# Patient Record
Sex: MALE | Race: WHITE | NOT HISPANIC OR LATINO | Employment: FULL TIME | URBAN - METROPOLITAN AREA
[De-identification: names, ages, dates, MRNs, and addresses within clinical notes are randomized per-mention and may not be internally consistent; named-entity substitution may affect disease eponyms.]

---

## 2018-04-02 ENCOUNTER — HOSPITAL ENCOUNTER (OUTPATIENT)
Dept: FAMILY MEDICINE CLINIC | Facility: CLINIC | Age: 58
Setting detail: SPECIMEN
Discharge: HOME | End: 2018-04-02
Attending: NURSE PRACTITIONER | Admitting: NURSE PRACTITIONER

## 2018-04-02 LAB
BILIRUB UR QL STRIP: NEGATIVE MG/DL
C TRACH RRNA SPEC QL PROBE: NORMAL
CASTS URNS QL MICRO: NORMAL /[LPF]
COLOR UR: YELLOW
CONV BACTERIA IN URINE MICRO: NEGATIVE
CONV CLARITY OF URINE: CLEAR
CONV HYALINE CASTS IN URINE MICRO: 0 /[LPF] (ref 0–5)
CONV PROTEIN IN URINE BY AUTOMATED TEST STRIP: NEGATIVE MG/DL
CONV SMALL ROUND CELLS: NORMAL /[HPF]
CONV UROBILINOGEN IN URINE BY AUTOMATED TEST STRIP: 0.2 MG/DL
CULTURE INDICATED?: NORMAL
GLUCOSE UR QL: NEGATIVE MG/DL
HGB UR QL STRIP: NEGATIVE
KETONES UR QL STRIP: NEGATIVE MG/DL
LEUKOCYTE ESTERASE UR QL STRIP: NEGATIVE
N GONORRHOEA RRNA SPEC QL PROBE: NORMAL
NITRITE UR QL STRIP: NEGATIVE
PH UR STRIP.AUTO: 5.5 [PH] (ref 4.5–8)
RBC #/AREA URNS HPF: 0 /[HPF] (ref 0–3)
SP GR UR: 1.01 (ref 1–1.03)
SPECIMEN SOURCE: NORMAL
SPERM URNS QL MICRO: NORMAL /[HPF]
SQUAMOUS SPT QL MICRO: 0 /[HPF] (ref 0–5)
T PALLIDUM IGG SER QL: NONREACTIVE
UNIDENT CRYS URNS QL MICRO: NORMAL /[HPF]
WBC #/AREA URNS HPF: 0 /[HPF] (ref 0–5)
YEAST SPEC QL WET PREP: NORMAL /[HPF]

## 2018-04-03 LAB
CONV HIV-1/ HIV-2: NORMAL
CONV HIV-1/ HIV-2: NORMAL
HSV1 IGG SER IA-ACNC: NEGATIVE

## 2018-05-11 ENCOUNTER — HOSPITAL ENCOUNTER (OUTPATIENT)
Dept: FAMILY MEDICINE CLINIC | Facility: CLINIC | Age: 58
Setting detail: SPECIMEN
Discharge: HOME | End: 2018-05-11
Attending: FAMILY MEDICINE | Admitting: FAMILY MEDICINE

## 2018-05-11 LAB
ALBUMIN SERPL-MCNC: 3.8 G/DL (ref 3.5–4.8)
ALBUMIN/GLOB SERPL: 1.4 {RATIO} (ref 1–1.7)
ALP SERPL-CCNC: 50 IU/L (ref 32–91)
ALT SERPL-CCNC: 26 IU/L (ref 17–63)
ANION GAP SERPL CALC-SCNC: 10 MMOL/L (ref 10–20)
AST SERPL-CCNC: 25 IU/L (ref 15–41)
BASOPHILS # BLD AUTO: 0.1 10*3/UL (ref 0–0.2)
BASOPHILS NFR BLD AUTO: 1 % (ref 0–2)
BILIRUB SERPL-MCNC: 0.6 MG/DL (ref 0.3–1.2)
BILIRUB UR QL STRIP: NEGATIVE MG/DL
BUN SERPL-MCNC: 14 MG/DL (ref 8–20)
BUN/CREAT SERPL: 14 (ref 6.2–20.3)
CALCIUM SERPL-MCNC: 9.2 MG/DL (ref 8.9–10.3)
CASTS URNS QL MICRO: NORMAL /[LPF]
CHLORIDE SERPL-SCNC: 101 MMOL/L (ref 101–111)
CHOLEST SERPL-MCNC: 161 MG/DL
CHOLEST/HDLC SERPL: 3 {RATIO}
COLOR UR: YELLOW
CONV BACTERIA IN URINE MICRO: NEGATIVE
CONV CLARITY OF URINE: CLEAR
CONV CO2: 27 MMOL/L (ref 22–32)
CONV HYALINE CASTS IN URINE MICRO: 0 /[LPF] (ref 0–5)
CONV LDL CHOLESTEROL DIRECT: 85 MG/DL (ref 0–100)
CONV PROTEIN IN URINE BY AUTOMATED TEST STRIP: NEGATIVE MG/DL
CONV SMALL ROUND CELLS: NORMAL /[HPF]
CONV TOTAL PROTEIN: 6.5 G/DL (ref 6.1–7.9)
CONV UROBILINOGEN IN URINE BY AUTOMATED TEST STRIP: 1 MG/DL
CREAT UR-MCNC: 1 MG/DL (ref 0.7–1.2)
CULTURE INDICATED?: NORMAL
DIFFERENTIAL METHOD BLD: (no result)
EOSINOPHIL # BLD AUTO: 0.2 10*3/UL (ref 0–0.3)
EOSINOPHIL # BLD AUTO: 2 % (ref 0–3)
ERYTHROCYTE [DISTWIDTH] IN BLOOD BY AUTOMATED COUNT: 12.9 % (ref 11.5–14.5)
GLOBULIN UR ELPH-MCNC: 2.7 G/DL (ref 2.5–3.8)
GLUCOSE SERPL-MCNC: 98 MG/DL (ref 65–99)
GLUCOSE UR QL: NEGATIVE MG/DL
HCT VFR BLD AUTO: 44.4 % (ref 40–54)
HDLC SERPL-MCNC: 54 MG/DL
HGB BLD-MCNC: 15 G/DL (ref 14–18)
HGB UR QL STRIP: NEGATIVE
KETONES UR QL STRIP: NEGATIVE MG/DL
LDLC/HDLC SERPL: 1.6 {RATIO}
LEUKOCYTE ESTERASE UR QL STRIP: NEGATIVE
LIPID INTERPRETATION: ABNORMAL
LYMPHOCYTES # BLD AUTO: 2.9 10*3/UL (ref 0.8–4.8)
LYMPHOCYTES NFR BLD AUTO: 33 % (ref 18–42)
MCH RBC QN AUTO: 32.4 PG (ref 26–32)
MCHC RBC AUTO-ENTMCNC: 33.7 G/DL (ref 32–36)
MCV RBC AUTO: 96.1 FL (ref 80–94)
MONOCYTES # BLD AUTO: 0.8 10*3/UL (ref 0.1–1.3)
MONOCYTES NFR BLD AUTO: 9 % (ref 2–11)
NEUTROPHILS # BLD AUTO: 4.9 10*3/UL (ref 2.3–8.6)
NEUTROPHILS NFR BLD AUTO: 55 % (ref 50–75)
NITRITE UR QL STRIP: NEGATIVE
NRBC BLD AUTO-RTO: 0 /100{WBCS}
NRBC/RBC NFR BLD MANUAL: 0 10*3/UL
PH UR STRIP.AUTO: 6 [PH] (ref 4.5–8)
PLATELET # BLD AUTO: 241 10*3/UL (ref 150–450)
PMV BLD AUTO: 7.7 FL (ref 7.4–10.4)
POTASSIUM SERPL-SCNC: 4 MMOL/L (ref 3.6–5.1)
RBC # BLD AUTO: 4.62 10*6/UL (ref 4.6–6)
RBC #/AREA URNS HPF: 0 /[HPF] (ref 0–3)
SODIUM SERPL-SCNC: 134 MMOL/L (ref 136–144)
SP GR UR: 1.02 (ref 1–1.03)
SPERM URNS QL MICRO: NORMAL /[HPF]
SQUAMOUS SPT QL MICRO: 0 /[HPF] (ref 0–5)
TRIGL SERPL-MCNC: 165 MG/DL
UNIDENT CRYS URNS QL MICRO: NORMAL /[HPF]
VLDLC SERPL CALC-MCNC: 22 MG/DL
WBC # BLD AUTO: 9 10*3/UL (ref 4.5–11.5)
WBC #/AREA URNS HPF: 0 /[HPF] (ref 0–5)
YEAST SPEC QL WET PREP: NORMAL /[HPF]

## 2018-09-11 ENCOUNTER — HOSPITAL ENCOUNTER (OUTPATIENT)
Dept: CARDIOLOGY | Facility: HOSPITAL | Age: 58
Discharge: HOME | End: 2018-09-11
Attending: INTERNAL MEDICINE | Admitting: INTERNAL MEDICINE

## 2018-09-19 ENCOUNTER — HOSPITAL ENCOUNTER (OUTPATIENT)
Dept: PREADMISSION TESTING | Facility: HOSPITAL | Age: 58
Discharge: HOME | End: 2018-09-19
Attending: INTERNAL MEDICINE | Admitting: INTERNAL MEDICINE

## 2018-09-19 LAB
ANION GAP SERPL CALC-SCNC: 12.1 MMOL/L (ref 10–20)
BASOPHILS # BLD AUTO: 0.1 10*3/UL (ref 0–0.2)
BASOPHILS NFR BLD AUTO: 1 % (ref 0–2)
BUN SERPL-MCNC: 13 MG/DL (ref 8–20)
BUN/CREAT SERPL: 13 (ref 6.2–20.3)
CALCIUM SERPL-MCNC: 8.9 MG/DL (ref 8.9–10.3)
CHLORIDE SERPL-SCNC: 103 MMOL/L (ref 101–111)
CHOLEST SERPL-MCNC: 186 MG/DL
CHOLEST/HDLC SERPL: 4.2 {RATIO}
CONV CO2: 27 MMOL/L (ref 22–32)
CONV LDL CHOLESTEROL DIRECT: 92 MG/DL (ref 0–100)
CREAT UR-MCNC: 1 MG/DL (ref 0.7–1.2)
DIFFERENTIAL METHOD BLD: (no result)
EOSINOPHIL # BLD AUTO: 0.2 10*3/UL (ref 0–0.3)
EOSINOPHIL # BLD AUTO: 2 % (ref 0–3)
ERYTHROCYTE [DISTWIDTH] IN BLOOD BY AUTOMATED COUNT: 13.1 % (ref 11.5–14.5)
GLUCOSE SERPL-MCNC: 105 MG/DL (ref 65–99)
HCT VFR BLD AUTO: 40.2 % (ref 40–54)
HDLC SERPL-MCNC: 44 MG/DL
HGB BLD-MCNC: 13.4 G/DL (ref 14–18)
INR PPP: 1
LDLC/HDLC SERPL: 2.1 {RATIO}
LIPID INTERPRETATION: ABNORMAL
LYMPHOCYTES # BLD AUTO: 2.7 10*3/UL (ref 0.8–4.8)
LYMPHOCYTES NFR BLD AUTO: 33 % (ref 18–42)
MCH RBC QN AUTO: 32 PG (ref 26–32)
MCHC RBC AUTO-ENTMCNC: 33.5 G/DL (ref 32–36)
MCV RBC AUTO: 95.7 FL (ref 80–94)
MONOCYTES # BLD AUTO: 0.8 10*3/UL (ref 0.1–1.3)
MONOCYTES NFR BLD AUTO: 10 % (ref 2–11)
NEUTROPHILS # BLD AUTO: 4.4 10*3/UL (ref 2.3–8.6)
NEUTROPHILS NFR BLD AUTO: 54 % (ref 50–75)
NRBC BLD AUTO-RTO: 0 /100{WBCS}
NRBC/RBC NFR BLD MANUAL: 0 10*3/UL
PLATELET # BLD AUTO: 233 10*3/UL (ref 150–450)
PMV BLD AUTO: 7.3 FL (ref 7.4–10.4)
POTASSIUM SERPL-SCNC: 4.1 MMOL/L (ref 3.6–5.1)
PROTHROMBIN TIME: 10.4 SEC (ref 9.6–11.7)
RBC # BLD AUTO: 4.2 10*6/UL (ref 4.6–6)
SODIUM SERPL-SCNC: 138 MMOL/L (ref 136–144)
TRIGL SERPL-MCNC: 308 MG/DL
VLDLC SERPL CALC-MCNC: 50.3 MG/DL
WBC # BLD AUTO: 8.2 10*3/UL (ref 4.5–11.5)

## 2018-10-16 ENCOUNTER — ON CAMPUS - OUTPATIENT (AMBULATORY)
Dept: URBAN - METROPOLITAN AREA HOSPITAL 2 | Facility: HOSPITAL | Age: 58
End: 2018-10-16
Payer: COMMERCIAL

## 2018-10-16 ENCOUNTER — OFFICE (AMBULATORY)
Dept: URBAN - METROPOLITAN AREA PATHOLOGY 4 | Facility: PATHOLOGY | Age: 58
End: 2018-10-16
Payer: COMMERCIAL

## 2018-10-16 VITALS
HEART RATE: 70 BPM | HEART RATE: 72 BPM | OXYGEN SATURATION: 96 % | DIASTOLIC BLOOD PRESSURE: 71 MMHG | HEART RATE: 81 BPM | HEART RATE: 74 BPM | SYSTOLIC BLOOD PRESSURE: 128 MMHG | TEMPERATURE: 97.5 F | SYSTOLIC BLOOD PRESSURE: 100 MMHG | SYSTOLIC BLOOD PRESSURE: 126 MMHG | SYSTOLIC BLOOD PRESSURE: 92 MMHG | DIASTOLIC BLOOD PRESSURE: 62 MMHG | DIASTOLIC BLOOD PRESSURE: 81 MMHG | OXYGEN SATURATION: 100 % | DIASTOLIC BLOOD PRESSURE: 60 MMHG | DIASTOLIC BLOOD PRESSURE: 48 MMHG | OXYGEN SATURATION: 99 % | SYSTOLIC BLOOD PRESSURE: 141 MMHG | HEIGHT: 70 IN | RESPIRATION RATE: 18 BRPM | RESPIRATION RATE: 15 BRPM | SYSTOLIC BLOOD PRESSURE: 160 MMHG | OXYGEN SATURATION: 98 % | HEART RATE: 75 BPM | HEART RATE: 78 BPM | HEART RATE: 76 BPM | SYSTOLIC BLOOD PRESSURE: 114 MMHG | OXYGEN SATURATION: 94 % | RESPIRATION RATE: 20 BRPM | WEIGHT: 179.8 LBS | DIASTOLIC BLOOD PRESSURE: 143 MMHG | SYSTOLIC BLOOD PRESSURE: 115 MMHG | RESPIRATION RATE: 16 BRPM | DIASTOLIC BLOOD PRESSURE: 64 MMHG | SYSTOLIC BLOOD PRESSURE: 102 MMHG | DIASTOLIC BLOOD PRESSURE: 109 MMHG

## 2018-10-16 DIAGNOSIS — D12.3 BENIGN NEOPLASM OF TRANSVERSE COLON: ICD-10-CM

## 2018-10-16 DIAGNOSIS — D12.0 BENIGN NEOPLASM OF CECUM: ICD-10-CM

## 2018-10-16 DIAGNOSIS — K57.30 DIVERTICULOSIS OF LARGE INTESTINE WITHOUT PERFORATION OR ABS: ICD-10-CM

## 2018-10-16 DIAGNOSIS — D12.2 BENIGN NEOPLASM OF ASCENDING COLON: ICD-10-CM

## 2018-10-16 DIAGNOSIS — Z12.11 ENCOUNTER FOR SCREENING FOR MALIGNANT NEOPLASM OF COLON: ICD-10-CM

## 2018-10-16 LAB
GI HISTOLOGY: A. UNSPECIFIED: (no result)
GI HISTOLOGY: B. UNSPECIFIED: (no result)
GI HISTOLOGY: C. UNSPECIFIED: (no result)
GI HISTOLOGY: PDF REPORT: (no result)

## 2018-10-16 PROCEDURE — 45380 COLONOSCOPY AND BIOPSY: CPT | Mod: 33,59 | Performed by: INTERNAL MEDICINE

## 2018-10-16 PROCEDURE — 88305 TISSUE EXAM BY PATHOLOGIST: CPT | Mod: 33 | Performed by: INTERNAL MEDICINE

## 2018-10-16 PROCEDURE — 45385 COLONOSCOPY W/LESION REMOVAL: CPT | Mod: 33 | Performed by: INTERNAL MEDICINE

## 2018-10-16 PROCEDURE — 45380 COLONOSCOPY AND BIOPSY: CPT | Mod: 59,33 | Performed by: INTERNAL MEDICINE

## 2018-10-16 PROCEDURE — 45381 COLONOSCOPY SUBMUCOUS NJX: CPT | Mod: 33 | Performed by: INTERNAL MEDICINE

## 2019-06-17 ENCOUNTER — OFFICE VISIT (OUTPATIENT)
Dept: FAMILY MEDICINE CLINIC | Facility: CLINIC | Age: 59
End: 2019-06-17

## 2019-06-17 VITALS
DIASTOLIC BLOOD PRESSURE: 82 MMHG | HEART RATE: 72 BPM | BODY MASS INDEX: 25.48 KG/M2 | HEIGHT: 70 IN | WEIGHT: 178 LBS | SYSTOLIC BLOOD PRESSURE: 150 MMHG | RESPIRATION RATE: 16 BRPM

## 2019-06-17 DIAGNOSIS — Z56.6 STRESS AT WORK: Primary | ICD-10-CM

## 2019-06-17 DIAGNOSIS — F41.1 GAD (GENERALIZED ANXIETY DISORDER): ICD-10-CM

## 2019-06-17 PROBLEM — Z87.891 PERSONAL HISTORY OF NICOTINE DEPENDENCE: Status: ACTIVE | Noted: 2018-05-11

## 2019-06-17 PROBLEM — I10 HYPERTENSION: Status: ACTIVE | Noted: 2019-06-17

## 2019-06-17 PROBLEM — I21.3 ST ELEVATION (STEMI) MYOCARDIAL INFARCTION: Status: ACTIVE | Noted: 2019-06-17

## 2019-06-17 PROBLEM — F17.210 CIGARETTE SMOKER: Status: ACTIVE | Noted: 2018-05-11

## 2019-06-17 PROCEDURE — 99214 OFFICE O/P EST MOD 30 MIN: CPT | Performed by: FAMILY MEDICINE

## 2019-06-17 RX ORDER — ATORVASTATIN CALCIUM 80 MG/1
80 TABLET, FILM COATED ORAL DAILY
Qty: 90 TABLET | Refills: 0 | Status: SHIPPED | OUTPATIENT
Start: 2019-06-17 | End: 2019-08-26 | Stop reason: SDUPTHER

## 2019-06-17 RX ORDER — VARENICLINE TARTRATE 1 MG/1
TABLET, FILM COATED ORAL
Refills: 5 | COMMUNITY
Start: 2019-03-28 | End: 2021-12-06

## 2019-06-17 RX ORDER — METOPROLOL SUCCINATE 50 MG/1
TABLET, EXTENDED RELEASE ORAL
Refills: 3 | COMMUNITY
Start: 2019-05-11 | End: 2019-08-26 | Stop reason: SDUPTHER

## 2019-06-17 RX ORDER — EZETIMIBE 10 MG/1
TABLET ORAL
Refills: 1 | COMMUNITY
Start: 2019-03-31 | End: 2019-07-17 | Stop reason: SDUPTHER

## 2019-06-17 RX ORDER — CLOPIDOGREL BISULFATE 75 MG/1
TABLET ORAL
Refills: 2 | COMMUNITY
Start: 2019-05-11 | End: 2019-11-30 | Stop reason: SDUPTHER

## 2019-06-17 RX ORDER — NITROGLYCERIN 0.4 MG/1
TABLET SUBLINGUAL
COMMUNITY
Start: 2015-06-04

## 2019-06-17 RX ORDER — ATORVASTATIN CALCIUM 80 MG/1
80 TABLET, FILM COATED ORAL DAILY
Refills: 2 | COMMUNITY
Start: 2019-06-13 | End: 2019-06-17 | Stop reason: SDUPTHER

## 2019-06-17 RX ORDER — ESCITALOPRAM OXALATE 10 MG/1
10 TABLET ORAL DAILY
Qty: 30 TABLET | Refills: 11 | Status: SHIPPED | OUTPATIENT
Start: 2019-06-17 | End: 2019-07-17

## 2019-06-17 RX ORDER — ASPIRIN 325 MG
325 TABLET ORAL DAILY
COMMUNITY
Start: 2011-09-08 | End: 2021-10-28 | Stop reason: SDUPTHER

## 2019-06-17 RX ORDER — LISINOPRIL 5 MG/1
TABLET ORAL
Refills: 3 | COMMUNITY
Start: 2019-05-11 | End: 2019-08-26 | Stop reason: SDUPTHER

## 2019-06-17 SDOH — HEALTH STABILITY - MENTAL HEALTH: OTHER PHYSICAL AND MENTAL STRAIN RELATED TO WORK: Z56.6

## 2019-06-17 NOTE — PROGRESS NOTES
"Rooming Tab(CC,VS,Pt Hx,Fall Screen)  Chief Complaint   Patient presents with   • Stress       Subjective     Patient is under a lot of stress at work.  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  He has severe CAD and is at risk for recurrent ischemia.  He is getting the work done but his boss is not easy to please.  Sleep is poor.  He is getting worse.      I have reviewed and updated his medications, medical history and problem list during today's office visit.     Patient Care Team:  Bryan Beckham MD as PCP - General  Judson Baumann MD as Consulting Physician (Cardiology)    Problem List Tab  Medications Tab  Synopsis Tab  Chart Review Tab  Care Everywhere Tab  Immunizations Tab  Patient History Tab    Social History     Tobacco Use   • Smoking status: Current Every Day Smoker   Substance Use Topics   • Alcohol use: No     Frequency: Never       Review of Systems   Constitutional: Positive for unexpected weight loss. Negative for fatigue.   Respiratory: Negative.    Cardiovascular: Negative for chest pain.   Neurological: Negative.    Psychiatric/Behavioral: Positive for behavioral problems and stress.       Objective     Rooming Tab(CC,VS,Pt Hx,Fall Screen)  /82 (BP Location: Left arm, Cuff Size: Adult)   Pulse 72   Resp 16   Ht 177.8 cm (70\")   Wt 80.7 kg (178 lb)   BMI 25.54 kg/m²     Body mass index is 25.54 kg/m².    Physical Exam   Constitutional: He appears well-developed and well-nourished.   HENT:   Head: Normocephalic.   Right Ear: External ear normal.   Left Ear: External ear normal.   Nose: Nose normal.   Mouth/Throat: Oropharynx is clear and moist.   Eyes: Conjunctivae and EOM are normal. Pupils are equal, round, and reactive to light.   Neck: Normal range of motion. Neck supple.   Cardiovascular: Normal rate, regular rhythm, normal heart sounds and intact distal pulses.   Pulmonary/Chest: Effort normal and breath sounds normal.   Abdominal: Soft.   Neurological: " He is alert.   Skin: Skin is warm.        Statin Choice Calculator  Data Reviewed:                   Assessment/Plan   Order Review Tab  Health Maintenance Tab  Patient Plan/Order Tab  Diagnoses and all orders for this visit:    1. Stress at work (Primary)  Comments:  Severe work stress.  Threat to his already high risk of recurrent ischemic CAD.  I suggest he consider moving on if he cant resolve the situation with his current boss.    Lexapro 10mg qd    2. DORA (generalized anxiety disorder)  Comments:  Severe sxs related to work.   Putting him at risk for more ischemic damage  Consider Lexapro 10mg qd        Wrapup Tab  Return in about 3 months (around 9/17/2019) for Recheck.

## 2019-07-17 RX ORDER — EZETIMIBE 10 MG/1
TABLET ORAL
Qty: 90 TABLET | Refills: 1 | Status: SHIPPED | OUTPATIENT
Start: 2019-07-17 | End: 2020-01-23

## 2019-08-26 RX ORDER — ATORVASTATIN CALCIUM 80 MG/1
TABLET, FILM COATED ORAL
Qty: 30 TABLET | Refills: 2 | Status: SHIPPED | OUTPATIENT
Start: 2019-08-26 | End: 2020-03-02

## 2019-08-26 RX ORDER — METOPROLOL SUCCINATE 50 MG/1
TABLET, EXTENDED RELEASE ORAL
Qty: 90 TABLET | Refills: 3 | Status: SHIPPED | OUTPATIENT
Start: 2019-08-26 | End: 2020-09-29

## 2019-08-26 RX ORDER — LISINOPRIL 5 MG/1
TABLET ORAL
Qty: 90 TABLET | Refills: 3 | Status: SHIPPED | OUTPATIENT
Start: 2019-08-26 | End: 2020-09-29

## 2019-12-02 RX ORDER — CLOPIDOGREL BISULFATE 75 MG/1
TABLET ORAL
Qty: 90 TABLET | Refills: 2 | Status: SHIPPED | OUTPATIENT
Start: 2019-12-02 | End: 2020-09-29

## 2020-01-23 RX ORDER — EZETIMIBE 10 MG/1
TABLET ORAL
Qty: 90 TABLET | Refills: 0 | Status: SHIPPED | OUTPATIENT
Start: 2020-01-23 | End: 2020-04-29

## 2020-03-02 RX ORDER — ATORVASTATIN CALCIUM 80 MG/1
TABLET, FILM COATED ORAL
Qty: 90 TABLET | Refills: 0 | Status: SHIPPED | OUTPATIENT
Start: 2020-03-02 | End: 2020-06-15

## 2020-04-29 RX ORDER — EZETIMIBE 10 MG/1
TABLET ORAL
Qty: 90 TABLET | Refills: 0 | Status: SHIPPED | OUTPATIENT
Start: 2020-04-29 | End: 2020-08-12

## 2020-06-15 RX ORDER — ATORVASTATIN CALCIUM 80 MG/1
TABLET, FILM COATED ORAL
Qty: 90 TABLET | Refills: 0 | Status: SHIPPED | OUTPATIENT
Start: 2020-06-15 | End: 2020-09-29

## 2020-08-12 RX ORDER — EZETIMIBE 10 MG/1
TABLET ORAL
Qty: 90 TABLET | Refills: 0 | Status: SHIPPED | OUTPATIENT
Start: 2020-08-12 | End: 2020-11-25

## 2020-09-29 RX ORDER — METOPROLOL SUCCINATE 50 MG/1
TABLET, EXTENDED RELEASE ORAL
Qty: 90 TABLET | Refills: 0 | Status: SHIPPED | OUTPATIENT
Start: 2020-09-29 | End: 2020-12-31

## 2020-09-29 RX ORDER — LISINOPRIL 5 MG/1
TABLET ORAL
Qty: 90 TABLET | Refills: 0 | Status: SHIPPED | OUTPATIENT
Start: 2020-09-29 | End: 2020-12-31

## 2020-09-29 RX ORDER — CLOPIDOGREL BISULFATE 75 MG/1
TABLET ORAL
Qty: 90 TABLET | Refills: 0 | Status: SHIPPED | OUTPATIENT
Start: 2020-09-29 | End: 2020-12-31

## 2020-09-29 RX ORDER — ATORVASTATIN CALCIUM 80 MG/1
TABLET, FILM COATED ORAL
Qty: 90 TABLET | Refills: 0 | Status: SHIPPED | OUTPATIENT
Start: 2020-09-29 | End: 2020-12-31

## 2020-11-25 RX ORDER — EZETIMIBE 10 MG/1
TABLET ORAL
Qty: 90 TABLET | Refills: 0 | Status: SHIPPED | OUTPATIENT
Start: 2020-11-25 | End: 2021-03-01

## 2020-12-31 RX ORDER — CLOPIDOGREL BISULFATE 75 MG/1
TABLET ORAL
Qty: 90 TABLET | Refills: 0 | Status: SHIPPED | OUTPATIENT
Start: 2020-12-31 | End: 2021-04-09

## 2020-12-31 RX ORDER — ATORVASTATIN CALCIUM 80 MG/1
TABLET, FILM COATED ORAL
Qty: 90 TABLET | Refills: 0 | Status: SHIPPED | OUTPATIENT
Start: 2020-12-31 | End: 2021-04-09

## 2020-12-31 RX ORDER — LISINOPRIL 5 MG/1
TABLET ORAL
Qty: 90 TABLET | Refills: 0 | Status: SHIPPED | OUTPATIENT
Start: 2020-12-31 | End: 2021-04-09

## 2020-12-31 RX ORDER — METOPROLOL SUCCINATE 50 MG/1
TABLET, EXTENDED RELEASE ORAL
Qty: 90 TABLET | Refills: 0 | Status: SHIPPED | OUTPATIENT
Start: 2020-12-31 | End: 2021-04-09

## 2021-03-01 RX ORDER — EZETIMIBE 10 MG/1
TABLET ORAL
Qty: 90 TABLET | Refills: 0 | Status: SHIPPED | OUTPATIENT
Start: 2021-03-01 | End: 2021-06-01

## 2021-04-09 RX ORDER — METOPROLOL SUCCINATE 50 MG/1
TABLET, EXTENDED RELEASE ORAL
Qty: 90 TABLET | Refills: 0 | Status: SHIPPED | OUTPATIENT
Start: 2021-04-09 | End: 2021-07-10

## 2021-04-09 RX ORDER — LISINOPRIL 5 MG/1
TABLET ORAL
Qty: 90 TABLET | Refills: 0 | Status: SHIPPED | OUTPATIENT
Start: 2021-04-09 | End: 2021-07-10

## 2021-04-09 RX ORDER — CLOPIDOGREL BISULFATE 75 MG/1
TABLET ORAL
Qty: 90 TABLET | Refills: 0 | Status: SHIPPED | OUTPATIENT
Start: 2021-04-09 | End: 2021-07-10

## 2021-04-09 RX ORDER — ATORVASTATIN CALCIUM 80 MG/1
TABLET, FILM COATED ORAL
Qty: 90 TABLET | Refills: 0 | Status: SHIPPED | OUTPATIENT
Start: 2021-04-09 | End: 2021-07-10

## 2021-06-01 RX ORDER — EZETIMIBE 10 MG/1
TABLET ORAL
Qty: 90 TABLET | Refills: 0 | Status: SHIPPED | OUTPATIENT
Start: 2021-06-01 | End: 2021-08-16 | Stop reason: SDUPTHER

## 2021-07-10 RX ORDER — METOPROLOL SUCCINATE 50 MG/1
TABLET, EXTENDED RELEASE ORAL
Qty: 90 TABLET | Refills: 0 | Status: SHIPPED | OUTPATIENT
Start: 2021-07-10 | End: 2021-10-28 | Stop reason: SDUPTHER

## 2021-07-10 RX ORDER — ATORVASTATIN CALCIUM 80 MG/1
TABLET, FILM COATED ORAL
Qty: 90 TABLET | Refills: 0 | Status: SHIPPED | OUTPATIENT
Start: 2021-07-10 | End: 2021-10-28 | Stop reason: SDUPTHER

## 2021-07-10 RX ORDER — CLOPIDOGREL BISULFATE 75 MG/1
TABLET ORAL
Qty: 90 TABLET | Refills: 0 | Status: SHIPPED | OUTPATIENT
Start: 2021-07-10 | End: 2021-10-28 | Stop reason: SDUPTHER

## 2021-07-10 RX ORDER — LISINOPRIL 5 MG/1
TABLET ORAL
Qty: 90 TABLET | Refills: 0 | Status: SHIPPED | OUTPATIENT
Start: 2021-07-10 | End: 2021-10-28 | Stop reason: SDUPTHER

## 2021-07-29 ENCOUNTER — TELEPHONE (OUTPATIENT)
Dept: CARDIOLOGY | Facility: CLINIC | Age: 61
End: 2021-07-29

## 2021-08-16 ENCOUNTER — OFFICE VISIT (OUTPATIENT)
Dept: CARDIOLOGY | Facility: CLINIC | Age: 61
End: 2021-08-16

## 2021-08-16 VITALS
SYSTOLIC BLOOD PRESSURE: 161 MMHG | WEIGHT: 176 LBS | HEART RATE: 69 BPM | BODY MASS INDEX: 25.2 KG/M2 | OXYGEN SATURATION: 99 % | HEIGHT: 70 IN | DIASTOLIC BLOOD PRESSURE: 90 MMHG

## 2021-08-16 DIAGNOSIS — Z01.810 PREOPERATIVE CARDIOVASCULAR EXAMINATION: ICD-10-CM

## 2021-08-16 DIAGNOSIS — I44.7 LBBB (LEFT BUNDLE BRANCH BLOCK): ICD-10-CM

## 2021-08-16 DIAGNOSIS — I25.5 ISCHEMIC CARDIOMYOPATHY: ICD-10-CM

## 2021-08-16 DIAGNOSIS — I10 ESSENTIAL HYPERTENSION: ICD-10-CM

## 2021-08-16 DIAGNOSIS — E78.5 DYSLIPIDEMIA: ICD-10-CM

## 2021-08-16 DIAGNOSIS — Z95.820 STATUS POST ANGIOPLASTY WITH STENT: Primary | ICD-10-CM

## 2021-08-16 PROCEDURE — 93000 ELECTROCARDIOGRAM COMPLETE: CPT | Performed by: INTERNAL MEDICINE

## 2021-08-16 PROCEDURE — 99204 OFFICE O/P NEW MOD 45 MIN: CPT | Performed by: INTERNAL MEDICINE

## 2021-08-16 RX ORDER — ASPIRIN 81 MG/1
81 TABLET ORAL DAILY
COMMUNITY
End: 2021-10-28 | Stop reason: SDUPTHER

## 2021-08-16 RX ORDER — EZETIMIBE 10 MG/1
10 TABLET ORAL DAILY
Qty: 90 TABLET | Refills: 3 | Status: SHIPPED | OUTPATIENT
Start: 2021-08-16 | End: 2022-11-03

## 2021-08-16 NOTE — PROGRESS NOTES
Encounter Date:08/16/2021  Last seen 2018      Patient ID: Zia Lott is a 60 y.o. male.    Chief Complaint:  Status post stent  Left bundle branch block  Preoperative cardiovascular evaluation prior to having colonoscopy      History of Present Illness  Patient last seen in 2018.  Cardiac catheterization was planned at that time but it is unclear whether this was done or not.  Patient does not recall.  Patient is scheduled to have colonoscopy.    Since I have last seen, the patient has been without any chest discomfort ,shortness of breath, palpitations, dizziness or syncope.  Denies having any headache ,abdominal pain ,nausea, vomiting , diarrhea constipation, loss of weight or loss of appetite.  Denies having any excessive bruising ,hematuria or blood in the stool.    Review of all systems negative except as indicated.    Reviewed ROS.  Assessment and Plan       ///////////////////////  Impression  ==========  -Preoperative cardiovascular evaluation prior to having colonoscopy.     - status post stent to LAD 05/30/2015 and 06/20/2013.    Status post subendocardial myocardial infarction 05/30/2015 prior to stent placement.    Cardiac catheterization 2015 revealed -  Previously placed LAD stent was patent.  Distal LAD has 50% disease.  Chronic and total occlusion of 1st marginal branch.  Distal marginal branch was filling from RCA.  Circumflex coronary artery has 90-95%  distal to the origin of marginal branch.  RCA is a large and dominant vessel that has diffuse 50% disease.    Echocardiogram 09/11/2018 revealed severe left ventricular dysfunction with ejection fraction of 25%.  Moderate mitral and tricuspid regurgitation is present.    Stress Cardiolite test 09/11/2018 revealed significant anterior apical inferior and poster lateral infarction and ischemia.       -status post anterior wall myocardial infarction 06/2013 and stent placement to LAD 06/2013.       -ischemic cardiomyopathy.  Recent left  ventricular ejection fraction was 25-30%.       -smoker -patient was advised to abstain from smoking.        -chronic left bundle-branch block      -hypertension and dyslipidemia      -history of drinking alcohol     -no known allergies  ===========   Plan  =============  Patient last seen in 2018.  Cardiac catheterization was planned at that time but it is unclear whether this was done or not.  Patient does not recall.  Patient is scheduled to have colonoscopy.    patient is not having any angina pectoris or congestive heart failure.  EKG showed sinus rhythm left bundle-branch block premature ventricle contractions.    Stress Cardiolite test  Echocardiogram    Medications are reviewed and updated.    patient has history of ischemic cardiomyopathy.  Abstinence from smoking    He made an attempt to look through the computers to see if cardiac cath was done in 2018.  No information is available at this time.    We will try to obtain and try to review the records from before.  Follow-up in the office on the same day      Further plan will depend on patient's Progress.  ///////////            Diagnosis Plan   1. Status post angioplasty with stent  Adult Transthoracic Echo Complete W/ Cont if Necessary Per Protocol    Stress Test With Myocardial Perfusion One Day   2. Essential hypertension  Adult Transthoracic Echo Complete W/ Cont if Necessary Per Protocol    Stress Test With Myocardial Perfusion One Day   3. Dyslipidemia  Adult Transthoracic Echo Complete W/ Cont if Necessary Per Protocol    Stress Test With Myocardial Perfusion One Day   4. Preoperative cardiovascular examination  Adult Transthoracic Echo Complete W/ Cont if Necessary Per Protocol    Stress Test With Myocardial Perfusion One Day   5. LBBB (left bundle branch block)  Adult Transthoracic Echo Complete W/ Cont if Necessary Per Protocol    Stress Test With Myocardial Perfusion One Day   6. Ischemic cardiomyopathy  Adult Transthoracic Echo Complete W/  Cont if Necessary Per Protocol    Stress Test With Myocardial Perfusion One Day   LAB RESULTS (LAST 7 DAYS)    CBC        BMP        CMP         BNP        TROPONIN        CoAg        Creatinine Clearance  CrCl cannot be calculated (Patient's most recent lab result is older than the maximum 30 days allowed.).    ABG        Radiology  No radiology results for the last day                The following portions of the patient's history were reviewed and updated as appropriate: allergies, current medications, past family history, past medical history, past social history, past surgical history and problem list.    Review of Systems   Constitutional: Negative for chills, fever and malaise/fatigue.   Cardiovascular: Negative for chest pain, dyspnea on exertion, leg swelling, palpitations and syncope.   Respiratory: Negative for shortness of breath.    Skin: Negative for rash.   Neurological: Negative for dizziness, light-headedness and numbness.         Current Outpatient Medications:   •  aspirin 81 MG EC tablet, Take 81 mg by mouth Daily., Disp: , Rfl:   •  atorvastatin (LIPITOR) 80 MG tablet, Take 1 tablet by mouth once daily, Disp: 90 tablet, Rfl: 0  •  clopidogrel (PLAVIX) 75 MG tablet, Take 1 tablet by mouth once daily, Disp: 90 tablet, Rfl: 0  •  ezetimibe (ZETIA) 10 MG tablet, Take 1 tablet by mouth Daily., Disp: 90 tablet, Rfl: 3  •  lisinopril (PRINIVIL,ZESTRIL) 5 MG tablet, Take 1 tablet by mouth once daily, Disp: 90 tablet, Rfl: 0  •  metoprolol succinate XL (TOPROL-XL) 50 MG 24 hr tablet, Take 1 tablet by mouth once daily, Disp: 90 tablet, Rfl: 0  •  nitroglycerin (NITROSTAT) 0.4 MG SL tablet, NITROSTAT 0.4 MG SUBL, Disp: , Rfl:   •  aspirin 325 MG tablet, Take 325 mg by mouth Daily., Disp: , Rfl:   •  CHANTIX CONTINUING MONTH JOHN 1 MG tablet, , Disp: , Rfl: 5    No Known Allergies    Family History   Problem Relation Age of Onset   • Diabetes Father        Past Surgical History:   Procedure Laterality Date  "  • CARDIAC CATHETERIZATION  05/30/2015   • CORONARY ANGIOPLASTY WITH STENT PLACEMENT  05/30/2015    stent lad and circumflex distal to the marginal branch   • CORONARY ANGIOPLASTY WITH STENT PLACEMENT  06/2013    stent to lad       Past Medical History:   Diagnosis Date   • BBB (bundle branch block)     lt bundle branch block   • CAD (coronary artery disease)    • Cardiomyopathy (CMS/HCC)    • CHD (coronary heart disease)    • Hyperlipidemia    • Hypertension    • Myocardial infarction (CMS/HCC)     acute       Family History   Problem Relation Age of Onset   • Diabetes Father        Social History     Socioeconomic History   • Marital status:      Spouse name: Not on file   • Number of children: Not on file   • Years of education: Not on file   • Highest education level: Not on file   Tobacco Use   • Smoking status: Current Every Day Smoker     Packs/day: 0.50     Types: Cigarettes   • Smokeless tobacco: Never Used   Vaping Use   • Vaping Use: Never used   Substance and Sexual Activity   • Alcohol use: No   • Drug use: No   • Sexual activity: Defer           ECG 12 Lead    Date/Time: 8/16/2021 11:59 AM  Performed by: Judson Baumann MD  Authorized by: Judson Baumann MD   Comparison: compared with previous ECG   Similar to previous ECG  Comparison to previous ECG: Sinus rhythm left bundle branch block 70/min nonspecific ST-T wave changes right axis deviation no ectopy no change from previous tracing.                Objective:       Physical Exam    /90 (BP Location: Left arm, Patient Position: Sitting, Cuff Size: Adult)   Pulse 69   Ht 177.8 cm (70\")   Wt 79.8 kg (176 lb)   SpO2 99%   BMI 25.25 kg/m²   The patient is alert, oriented and in no distress.    Vital signs as noted above.  Exogenous obesity.    Head and neck revealed no carotid bruits or jugular venous distension.  No thyromegaly or lymphadenopathy is present.    Lungs clear.  No wheezing.  Breath sounds are normal " bilaterally.    Heart normal first and second heart sounds.  No murmur..  No pericardial rub is present.  No gallop is present.    Abdomen soft and nontender.  No organomegaly is present.    Extremities revealed good peripheral pulses without any pedal edema.    Skin warm and dry.    Musculoskeletal system is grossly normal.    CNS grossly normal.    Reviewed and unchanged from last visit.

## 2021-08-20 ENCOUNTER — TELEPHONE (OUTPATIENT)
Dept: CARDIOLOGY | Facility: CLINIC | Age: 61
End: 2021-08-20

## 2021-08-20 NOTE — TELEPHONE ENCOUNTER
Pt is getting clearance for a colonoscopy.  Pt has personal issues and needs to cancel appts for stress test and to see Dr. Baumann on 8/24 ( I cancelled the [facility] appt and transferred to [facility] to cancel and resched) .  Also, he had a heart cath 3 years ago before his last colonoscopy and it showed no blockages.

## 2021-08-23 NOTE — TELEPHONE ENCOUNTER
Called [facility] and left VM to let them know patient needing testing prior to getting cleared, and patient cancelled testing due to personal issues.

## 2021-08-24 ENCOUNTER — APPOINTMENT (OUTPATIENT)
Dept: CARDIOLOGY | Facility: HOSPITAL | Age: 61
End: 2021-08-24

## 2021-08-24 ENCOUNTER — TELEPHONE (OUTPATIENT)
Dept: CARDIOLOGY | Facility: HOSPITAL | Age: 61
End: 2021-08-24

## 2021-08-24 DIAGNOSIS — Z01.810 PREOPERATIVE CARDIOVASCULAR EXAMINATION: Primary | ICD-10-CM

## 2021-08-24 DIAGNOSIS — Z95.820 STATUS POST ANGIOPLASTY WITH STENT: ICD-10-CM

## 2021-08-26 ENCOUNTER — TELEPHONE (OUTPATIENT)
Dept: CARDIOLOGY | Facility: HOSPITAL | Age: 61
End: 2021-08-26

## 2021-09-02 ENCOUNTER — APPOINTMENT (OUTPATIENT)
Dept: CARDIOLOGY | Facility: HOSPITAL | Age: 61
End: 2021-09-02

## 2021-09-14 ENCOUNTER — TELEPHONE (OUTPATIENT)
Dept: CARDIOLOGY | Facility: HOSPITAL | Age: 61
End: 2021-09-14

## 2021-09-14 ENCOUNTER — APPOINTMENT (OUTPATIENT)
Dept: CARDIOLOGY | Facility: HOSPITAL | Age: 61
End: 2021-09-14

## 2021-09-16 NOTE — TELEPHONE ENCOUNTER
Patient is cleared can hold blood thinners 3 days prior letter faxed and given to MR for scanning

## 2021-10-21 ENCOUNTER — OFFICE (AMBULATORY)
Dept: URBAN - METROPOLITAN AREA PATHOLOGY 4 | Facility: PATHOLOGY | Age: 61
End: 2021-10-21
Payer: COMMERCIAL

## 2021-10-21 ENCOUNTER — ON CAMPUS - OUTPATIENT (AMBULATORY)
Dept: URBAN - METROPOLITAN AREA HOSPITAL 2 | Facility: HOSPITAL | Age: 61
End: 2021-10-21
Payer: COMMERCIAL

## 2021-10-21 VITALS
SYSTOLIC BLOOD PRESSURE: 122 MMHG | DIASTOLIC BLOOD PRESSURE: 74 MMHG | OXYGEN SATURATION: 96 % | RESPIRATION RATE: 16 BRPM | RESPIRATION RATE: 18 BRPM | SYSTOLIC BLOOD PRESSURE: 137 MMHG | DIASTOLIC BLOOD PRESSURE: 73 MMHG | DIASTOLIC BLOOD PRESSURE: 84 MMHG | DIASTOLIC BLOOD PRESSURE: 87 MMHG | SYSTOLIC BLOOD PRESSURE: 117 MMHG | HEART RATE: 80 BPM | HEART RATE: 81 BPM | HEIGHT: 70 IN | TEMPERATURE: 97 F | HEART RATE: 78 BPM | OXYGEN SATURATION: 100 % | SYSTOLIC BLOOD PRESSURE: 147 MMHG | WEIGHT: 174 LBS | DIASTOLIC BLOOD PRESSURE: 57 MMHG | HEART RATE: 79 BPM | OXYGEN SATURATION: 98 % | SYSTOLIC BLOOD PRESSURE: 112 MMHG | SYSTOLIC BLOOD PRESSURE: 118 MMHG

## 2021-10-21 DIAGNOSIS — K64.1 SECOND DEGREE HEMORRHOIDS: ICD-10-CM

## 2021-10-21 DIAGNOSIS — D12.5 BENIGN NEOPLASM OF SIGMOID COLON: ICD-10-CM

## 2021-10-21 DIAGNOSIS — Z86.010 PERSONAL HISTORY OF COLONIC POLYPS: ICD-10-CM

## 2021-10-21 DIAGNOSIS — D12.4 BENIGN NEOPLASM OF DESCENDING COLON: ICD-10-CM

## 2021-10-21 DIAGNOSIS — K57.30 DIVERTICULOSIS OF LARGE INTESTINE WITHOUT PERFORATION OR ABS: ICD-10-CM

## 2021-10-21 DIAGNOSIS — D12.0 BENIGN NEOPLASM OF CECUM: ICD-10-CM

## 2021-10-21 DIAGNOSIS — Z08 ENCOUNTER FOR FOLLOW-UP EXAMINATION AFTER COMPLETED TREATMEN: ICD-10-CM

## 2021-10-21 PROBLEM — K63.5 POLYP OF COLON: Status: ACTIVE | Noted: 2021-10-21

## 2021-10-21 PROCEDURE — 45385 COLONOSCOPY W/LESION REMOVAL: CPT | Mod: 33 | Performed by: INTERNAL MEDICINE

## 2021-10-21 PROCEDURE — 88305 TISSUE EXAM BY PATHOLOGIST: CPT | Mod: 33 | Performed by: INTERNAL MEDICINE

## 2021-10-25 RX ORDER — LISINOPRIL 5 MG/1
TABLET ORAL
Qty: 90 TABLET | Refills: 0 | OUTPATIENT
Start: 2021-10-25

## 2021-10-25 RX ORDER — CLOPIDOGREL BISULFATE 75 MG/1
TABLET ORAL
Qty: 90 TABLET | Refills: 0 | OUTPATIENT
Start: 2021-10-25

## 2021-10-25 RX ORDER — METOPROLOL SUCCINATE 50 MG/1
TABLET, EXTENDED RELEASE ORAL
Qty: 90 TABLET | Refills: 0 | OUTPATIENT
Start: 2021-10-25

## 2021-10-25 RX ORDER — ATORVASTATIN CALCIUM 80 MG/1
TABLET, FILM COATED ORAL
Qty: 90 TABLET | Refills: 0 | OUTPATIENT
Start: 2021-10-25

## 2021-10-26 NOTE — TELEPHONE ENCOUNTER
PATIENT CALLED TO CHECK WHY HIS MEDICATIONS WERE REFUSED. JR REQUESTED A CALL TO DISCUSS.    PATIENT HAS ONE DAY LEFT OF MEDICATION.    PLEASE ADVISE [phone]

## 2021-10-27 NOTE — TELEPHONE ENCOUNTER
Patient called back again today to see why his meds were denied.  I told him because he had not been seen in over 2 years.  He scheduled an appt with [facility] on 12/6/2021.  He is out of meds.  Can you send in the 5 from this message that was denied?

## 2021-10-28 RX ORDER — METOPROLOL SUCCINATE 50 MG/1
50 TABLET, EXTENDED RELEASE ORAL DAILY
Qty: 30 TABLET | Refills: 0 | Status: SHIPPED | OUTPATIENT
Start: 2021-10-28 | End: 2021-12-06 | Stop reason: SDUPTHER

## 2021-10-28 RX ORDER — ATORVASTATIN CALCIUM 80 MG/1
80 TABLET, FILM COATED ORAL DAILY
Qty: 30 TABLET | Refills: 0 | Status: SHIPPED | OUTPATIENT
Start: 2021-10-28 | End: 2021-12-06 | Stop reason: SDUPTHER

## 2021-10-28 RX ORDER — CLOPIDOGREL BISULFATE 75 MG/1
75 TABLET ORAL DAILY
Qty: 30 TABLET | Refills: 0 | Status: SHIPPED | OUTPATIENT
Start: 2021-10-28 | End: 2021-12-06 | Stop reason: SDUPTHER

## 2021-10-28 RX ORDER — LISINOPRIL 5 MG/1
5 TABLET ORAL DAILY
Qty: 30 TABLET | Refills: 0 | Status: SHIPPED | OUTPATIENT
Start: 2021-10-28 | End: 2021-12-06 | Stop reason: SDUPTHER

## 2021-10-28 NOTE — TELEPHONE ENCOUNTER
Caller: Zia Lott    Relationship: Self       Medication requested (name and dosage): Atorvastatin Calcium 80 MG Take 1 tablet by mouth once daily     Metoprolol Succinate 50 MG Take 1 tablet by mouth once daily     Lisinopril 5 MG Take 1 tablet by mouth once martin     Clopidogrel Bisulfate 75 MG Take 1 tablet by mouth once daily        Requested Prescriptions:   Requested Prescriptions     Pending Prescriptions Disp Refills   • atorvastatin (LIPITOR) 80 MG tablet 90 tablet 0     Sig: Take 1 tablet by mouth Daily.   • clopidogrel (PLAVIX) 75 MG tablet 90 tablet 0     Sig: Take 1 tablet by mouth Daily.   • lisinopril (PRINIVIL,ZESTRIL) 5 MG tablet 90 tablet 0     Sig: Take 1 tablet by mouth Daily.   • metoprolol succinate XL (TOPROL-XL) 50 MG 24 hr tablet 90 tablet 0     Sig: Take 1 tablet by mouth Daily.        Pharmacy where request should be sent:   [facility] [address] [place] [address] - [phone] [id number] [place], [place] [phone]    Additional details provided by patient: PATIENT IS OUT. PLEASE REFERENCE 10/25 ENCOUNTER. PATIENT HAS SET UP SOONEST 12/6/2021 APPOINTMENT WITH DR FONSECA BUT WILL NEED AN EMERGENCY SCRIPT UNTIL HE CAN MAKE IT IN.    Best call back number: [phone]    Does the patient have less than a 3 day supply:  [x] Yes  [] No    Elizabeth Patel Rep   10/28/21 15:32 EDT

## 2021-12-02 ENCOUNTER — TELEPHONE (OUTPATIENT)
Dept: FAMILY MEDICINE CLINIC | Facility: CLINIC | Age: 61
End: 2021-12-02

## 2021-12-02 RX ORDER — CLOPIDOGREL BISULFATE 75 MG/1
75 TABLET ORAL DAILY
Qty: 30 TABLET | Refills: 0 | OUTPATIENT
Start: 2021-12-02

## 2021-12-02 RX ORDER — METOPROLOL SUCCINATE 50 MG/1
50 TABLET, EXTENDED RELEASE ORAL DAILY
Qty: 30 TABLET | Refills: 0 | OUTPATIENT
Start: 2021-12-02

## 2021-12-02 RX ORDER — ATORVASTATIN CALCIUM 80 MG/1
80 TABLET, FILM COATED ORAL DAILY
Qty: 30 TABLET | Refills: 0 | OUTPATIENT
Start: 2021-12-02

## 2021-12-02 RX ORDER — NITROGLYCERIN 0.4 MG/1
TABLET SUBLINGUAL
OUTPATIENT
Start: 2021-12-02

## 2021-12-02 RX ORDER — LISINOPRIL 5 MG/1
5 TABLET ORAL DAILY
Qty: 30 TABLET | Refills: 0 | OUTPATIENT
Start: 2021-12-02

## 2021-12-06 ENCOUNTER — LAB (OUTPATIENT)
Dept: FAMILY MEDICINE CLINIC | Facility: CLINIC | Age: 61
End: 2021-12-06

## 2021-12-06 ENCOUNTER — OFFICE VISIT (OUTPATIENT)
Dept: FAMILY MEDICINE CLINIC | Facility: CLINIC | Age: 61
End: 2021-12-06

## 2021-12-06 VITALS
BODY MASS INDEX: 25.62 KG/M2 | WEIGHT: 179 LBS | DIASTOLIC BLOOD PRESSURE: 88 MMHG | RESPIRATION RATE: 16 BRPM | OXYGEN SATURATION: 100 % | HEART RATE: 95 BPM | HEIGHT: 70 IN | SYSTOLIC BLOOD PRESSURE: 150 MMHG | TEMPERATURE: 97.3 F

## 2021-12-06 DIAGNOSIS — E78.2 MIXED HYPERLIPIDEMIA: ICD-10-CM

## 2021-12-06 DIAGNOSIS — I10 PRIMARY HYPERTENSION: ICD-10-CM

## 2021-12-06 DIAGNOSIS — F17.210 CIGARETTE SMOKER: ICD-10-CM

## 2021-12-06 DIAGNOSIS — Z12.5 SPECIAL SCREENING FOR MALIGNANT NEOPLASM OF PROSTATE: ICD-10-CM

## 2021-12-06 DIAGNOSIS — Z98.61 STATUS POST PERCUTANEOUS TRANSLUMINAL CORONARY ANGIOPLASTY: ICD-10-CM

## 2021-12-06 DIAGNOSIS — Z56.6 STRESS AT WORK: ICD-10-CM

## 2021-12-06 DIAGNOSIS — I25.10 CHRONIC CORONARY ARTERY DISEASE: Primary | ICD-10-CM

## 2021-12-06 PROBLEM — Z87.891 PERSONAL HISTORY OF NICOTINE DEPENDENCE: Status: RESOLVED | Noted: 2018-05-11 | Resolved: 2021-12-06

## 2021-12-06 LAB — HBA1C MFR BLD: 5.7 % (ref 3.5–5.6)

## 2021-12-06 PROCEDURE — 80053 COMPREHEN METABOLIC PANEL: CPT | Performed by: FAMILY MEDICINE

## 2021-12-06 PROCEDURE — G0103 PSA SCREENING: HCPCS | Performed by: FAMILY MEDICINE

## 2021-12-06 PROCEDURE — 84443 ASSAY THYROID STIM HORMONE: CPT | Performed by: FAMILY MEDICINE

## 2021-12-06 PROCEDURE — 82306 VITAMIN D 25 HYDROXY: CPT | Performed by: FAMILY MEDICINE

## 2021-12-06 PROCEDURE — 80061 LIPID PANEL: CPT | Performed by: FAMILY MEDICINE

## 2021-12-06 PROCEDURE — 99396 PREV VISIT EST AGE 40-64: CPT | Performed by: FAMILY MEDICINE

## 2021-12-06 PROCEDURE — 85025 COMPLETE CBC W/AUTO DIFF WBC: CPT | Performed by: FAMILY MEDICINE

## 2021-12-06 PROCEDURE — 81003 URINALYSIS AUTO W/O SCOPE: CPT | Performed by: FAMILY MEDICINE

## 2021-12-06 PROCEDURE — 82607 VITAMIN B-12: CPT | Performed by: FAMILY MEDICINE

## 2021-12-06 PROCEDURE — 83036 HEMOGLOBIN GLYCOSYLATED A1C: CPT | Performed by: FAMILY MEDICINE

## 2021-12-06 PROCEDURE — 84439 ASSAY OF FREE THYROXINE: CPT | Performed by: FAMILY MEDICINE

## 2021-12-06 PROCEDURE — 36415 COLL VENOUS BLD VENIPUNCTURE: CPT | Performed by: FAMILY MEDICINE

## 2021-12-06 RX ORDER — METOPROLOL SUCCINATE 50 MG/1
50 TABLET, EXTENDED RELEASE ORAL DAILY
Qty: 90 TABLET | Refills: 3 | Status: SHIPPED | OUTPATIENT
Start: 2021-12-06 | End: 2023-01-04

## 2021-12-06 RX ORDER — LISINOPRIL 5 MG/1
5 TABLET ORAL DAILY
Qty: 90 TABLET | Refills: 3 | Status: SHIPPED | OUTPATIENT
Start: 2021-12-06 | End: 2023-01-04

## 2021-12-06 RX ORDER — CLOPIDOGREL BISULFATE 75 MG/1
75 TABLET ORAL DAILY
Qty: 90 TABLET | Refills: 3 | Status: SHIPPED | OUTPATIENT
Start: 2021-12-06 | End: 2023-01-04

## 2021-12-06 RX ORDER — ASPIRIN 81 MG/1
81 TABLET ORAL DAILY
COMMUNITY

## 2021-12-06 RX ORDER — ATORVASTATIN CALCIUM 80 MG/1
80 TABLET, FILM COATED ORAL DAILY
Qty: 90 TABLET | Refills: 3 | Status: SHIPPED | OUTPATIENT
Start: 2021-12-06 | End: 2023-01-04

## 2021-12-06 SDOH — HEALTH STABILITY - MENTAL HEALTH: OTHER PHYSICAL AND MENTAL STRAIN RELATED TO WORK: Z56.6

## 2021-12-06 NOTE — PROGRESS NOTES
Chief Complaint  Hyperlipidemia and Hypertension    Subjective          Zia Lott presents to [facility]  History of Present Illness    The patient presents today for a follow-up. He is requesting refills on his medications.     He has a history of coronary artery disease. The patient denies any recent cardiac issues. He states that he has seen Judson Baumann MD 2 times in the past 3 years. The patient has a history of angioplasty and states that he had 3 or 4 stents placed, the most recent one being in 2015.    His blood pressure is elevated in the office today. A second blood pressure reading was obtained that revealed a blood pressure of 160/90 mm/Hg. He has not taken his medication since 12/04/2021 because he ran out and was told that they could not be refilled until he was seen for an appointment. The patient's medication list includes metoprolol succinate 50 mg once daily, lisinopril 5 mg once daily, Plavix 75 mg once daily, Lipitor 80 mg at night, Zetia 10 mg and aspirin. He states that he takes his medications after lunch. The patient states that he has never used the Nitrostat tablets.     The patient notes that he recently got  again 2 years ago. He is currently working as a supervisor for [employer]. He states that he is going to step down from his current position due to stress. He remains active by working a very physical job. He currently smokes and is no longer using Chantix    The patient is due for a vision exam. He states that he does have cataracts. He denies any issues with urination. He notes that his appetite is doing well. He denies any bowel issues. He had a colonoscopy approximately 4-6 weeks ago and states that he will be due for another colonoscopy in 3 years due to polyps. The patient received the COVID-19 vaccines and states that he is going to schedule his COVID-19 vaccine booster in a couple of weeks.     Objective   Vital Signs:   BP  "150/88 (BP Location: Left arm, Patient Position: Sitting, Cuff Size: Adult)   Pulse 95   Temp 97.3 °F (36.3 °C) (Temporal)   Resp 16   Ht 177.8 cm (70\")   Wt 81.2 kg (179 lb)   SpO2 100%   BMI 25.68 kg/m²     Physical Exam  Constitutional:       Appearance: Normal appearance. He is well-developed and normal weight.   HENT:      Head: Normocephalic and atraumatic.      Right Ear: Tympanic membrane, ear canal and external ear normal.      Left Ear: Tympanic membrane, ear canal and external ear normal.      Nose: Nose normal.      Mouth/Throat:      Mouth: Mucous membranes are moist.      Pharynx: Oropharynx is clear. No oropharyngeal exudate.   Eyes:      Extraocular Movements: Extraocular movements intact.      Conjunctiva/sclera: Conjunctivae normal.      Pupils: Pupils are equal, round, and reactive to light.   Cardiovascular:      Rate and Rhythm: Normal rate and regular rhythm.      Pulses: Normal pulses.      Heart sounds: Normal heart sounds.   Pulmonary:      Effort: Pulmonary effort is normal.      Breath sounds: Normal breath sounds.   Abdominal:      General: Bowel sounds are normal.      Palpations: Abdomen is soft.   Musculoskeletal:         General: Normal range of motion.      Cervical back: Normal range of motion and neck supple.   Skin:     General: Skin is warm and dry.   Neurological:      General: No focal deficit present.      Mental Status: He is alert and oriented to person, place, and time. Mental status is at baseline.   Psychiatric:         Mood and Affect: Mood normal.         Behavior: Behavior normal.         Thought Content: Thought content normal.         Judgment: Judgment normal.        Result Review :                 Assessment and Plan    Diagnoses and all orders for this visit:    1. Chronic coronary artery disease (Primary)  -     metoprolol succinate XL (TOPROL-XL) 50 MG 24 hr tablet; Take 1 tablet by mouth Daily for 90 days.  Dispense: 90 tablet; Refill: 3  -     " lisinopril (PRINIVIL,ZESTRIL) 5 MG tablet; Take 1 tablet by mouth Daily for 90 days.  Dispense: 90 tablet; Refill: 3  -     clopidogrel (PLAVIX) 75 MG tablet; Take 1 tablet by mouth Daily for 90 days.  Dispense: 90 tablet; Refill: 3  -     atorvastatin (LIPITOR) 80 MG tablet; Take 1 tablet by mouth Daily for 90 days.  Dispense: 90 tablet; Refill: 3  -     CBC & Differential  -     Comprehensive Metabolic Panel  -     Hemoglobin A1c  -     Lipid Panel  -     PSA Screen  -     T4, Free  -     TSH  -     Urinalysis With Culture If Indicated - Urine, Clean Catch  -     Vitamin B12  -     Vitamin D 25 Hydroxy  - He has been angina free for several years now. He has not been very compliant with follow-up with his cardiologist or surgical team. He had an angioplasty with several stents placed approximately 3 to 4 years ago.  He thinks his last stent was in 2016, but he is not sure. He has not had any angina, so we are going to continue with risk factor reduction as best we can.    2. Mixed hyperlipidemia  -     metoprolol succinate XL (TOPROL-XL) 50 MG 24 hr tablet; Take 1 tablet by mouth Daily for 90 days.  Dispense: 90 tablet; Refill: 3  -     lisinopril (PRINIVIL,ZESTRIL) 5 MG tablet; Take 1 tablet by mouth Daily for 90 days.  Dispense: 90 tablet; Refill: 3  -     clopidogrel (PLAVIX) 75 MG tablet; Take 1 tablet by mouth Daily for 90 days.  Dispense: 90 tablet; Refill: 3  -     atorvastatin (LIPITOR) 80 MG tablet; Take 1 tablet by mouth Daily for 90 days.  Dispense: 90 tablet; Refill: 3  -     CBC & Differential  -     Comprehensive Metabolic Panel  -     Hemoglobin A1c  -     Lipid Panel  -     PSA Screen  -     T4, Free  -     TSH  -     Urinalysis With Culture If Indicated - Urine, Clean Catch  -     Vitamin B12  -     Vitamin D 25 Hydroxy  -  We will check his lipid level today and we will almost certainly be continuing his Lipitor 80 mg and his Zetia 10 mg.     3. Primary hypertension  -     metoprolol succinate XL  (TOPROL-XL) 50 MG 24 hr tablet; Take 1 tablet by mouth Daily for 90 days.  Dispense: 90 tablet; Refill: 3  -     lisinopril (PRINIVIL,ZESTRIL) 5 MG tablet; Take 1 tablet by mouth Daily for 90 days.  Dispense: 90 tablet; Refill: 3  -     clopidogrel (PLAVIX) 75 MG tablet; Take 1 tablet by mouth Daily for 90 days.  Dispense: 90 tablet; Refill: 3  -     atorvastatin (LIPITOR) 80 MG tablet; Take 1 tablet by mouth Daily for 90 days.  Dispense: 90 tablet; Refill: 3  -     CBC & Differential  -     Comprehensive Metabolic Panel  -     Hemoglobin A1c  -     Lipid Panel  -     PSA Screen  -     T4, Free  -     TSH  -     Urinalysis With Culture If Indicated - Urine, Clean Catch  -     Vitamin B12  -     Vitamin D 25 Hydroxy  -  His systolic blood pressure is elevated today. It was 188 when he first came in. When I took it a while later, it was 160. His diastolic number was excellent.   - He needs to take his medicine. He has been off medicine totally for about 4 days. I told him never to let that happen again and to call us if they try to tell him he can not have those.    4. Status post percutaneous transluminal coronary angioplasty  -     metoprolol succinate XL (TOPROL-XL) 50 MG 24 hr tablet; Take 1 tablet by mouth Daily for 90 days.  Dispense: 90 tablet; Refill: 3  -     lisinopril (PRINIVIL,ZESTRIL) 5 MG tablet; Take 1 tablet by mouth Daily for 90 days.  Dispense: 90 tablet; Refill: 3  -     clopidogrel (PLAVIX) 75 MG tablet; Take 1 tablet by mouth Daily for 90 days.  Dispense: 90 tablet; Refill: 3  -     atorvastatin (LIPITOR) 80 MG tablet; Take 1 tablet by mouth Daily for 90 days.  Dispense: 90 tablet; Refill: 3  -     CBC & Differential  -     Comprehensive Metabolic Panel  -     Hemoglobin A1c  -     Lipid Panel  -     PSA Screen  -     T4, Free  -     TSH  -     Urinalysis With Culture If Indicated - Urine, Clean Catch  -     Vitamin B12  -     Vitamin D 25 Hydroxy      5. Cigarette smoker  -     metoprolol  succinate XL (TOPROL-XL) 50 MG 24 hr tablet; Take 1 tablet by mouth Daily for 90 days.  Dispense: 90 tablet; Refill: 3  -     lisinopril (PRINIVIL,ZESTRIL) 5 MG tablet; Take 1 tablet by mouth Daily for 90 days.  Dispense: 90 tablet; Refill: 3  -     clopidogrel (PLAVIX) 75 MG tablet; Take 1 tablet by mouth Daily for 90 days.  Dispense: 90 tablet; Refill: 3  -     atorvastatin (LIPITOR) 80 MG tablet; Take 1 tablet by mouth Daily for 90 days.  Dispense: 90 tablet; Refill: 3  -     CBC & Differential  -     Comprehensive Metabolic Panel  -     Hemoglobin A1c  -     Lipid Panel  -     PSA Screen  -     T4, Free  -     TSH  -     Urinalysis With Culture If Indicated - Urine, Clean Catch  -     Vitamin B12  -     Vitamin D 25 Hydroxy  - Ric continues to smoke. We discussed his options and he is going to call me back if he becomes interested in the Wellbutrin and the NicoDerm patch. I think that is his best bet. He has tried Chantix before and that did not work.    6. Stress at work  -     metoprolol succinate XL (TOPROL-XL) 50 MG 24 hr tablet; Take 1 tablet by mouth Daily for 90 days.  Dispense: 90 tablet; Refill: 3  -     lisinopril (PRINIVIL,ZESTRIL) 5 MG tablet; Take 1 tablet by mouth Daily for 90 days.  Dispense: 90 tablet; Refill: 3  -     clopidogrel (PLAVIX) 75 MG tablet; Take 1 tablet by mouth Daily for 90 days.  Dispense: 90 tablet; Refill: 3  -     atorvastatin (LIPITOR) 80 MG tablet; Take 1 tablet by mouth Daily for 90 days.  Dispense: 90 tablet; Refill: 3  -     CBC & Differential  -     Comprehensive Metabolic Panel  -     Hemoglobin A1c  -     Lipid Panel  -     PSA Screen  -     T4, Free  -     TSH  -     Urinalysis With Culture If Indicated - Urine, Clean Catch  -     Vitamin B12  -     Vitamin D 25 Hydroxy  -  Patient has a situation at work that is very stressful. We are going to try and get him to change to a position that is less stressful. 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    7. Special screening for malignant neoplasm of prostate  -     metoprolol succinate XL (TOPROL-XL) 50 MG 24 hr tablet; Take 1 tablet by mouth Daily for 90 days.  Dispense: 90 tablet; Refill: 3  -     lisinopril (PRINIVIL,ZESTRIL) 5 MG tablet; Take 1 tablet by mouth Daily for 90 days.  Dispense: 90 tablet; Refill: 3  -     clopidogrel (PLAVIX) 75 MG tablet; Take 1 tablet by mouth Daily for 90 days.  Dispense: 90 tablet; Refill: 3  -     atorvastatin (LIPITOR) 80 MG tablet; Take 1 tablet by mouth Daily for 90 days.  Dispense: 90 tablet; Refill: 3  -     CBC & Differential  -     Comprehensive Metabolic Panel  -     Hemoglobin A1c  -     Lipid Panel  -     PSA Screen  -     T4, Free  -     TSH  -     Urinalysis With Culture If Indicated - Urine, Clean Catch  -     Vitamin B12  -     Vitamin D 25 Hydroxy  - We will obtain a PSA today. He has not had one for several years.         Follow Up   Return in about 1 year (around 12/6/2022) for Recheck.  Patient was given instructions and counseling regarding his condition or for health maintenance advice. Please see specific information pulled into the AVS if appropriate.     Transcribed from ambient dictation for Bryan Beckham MD by Mila Wu.  12/06/21   18:15 EST    Patient verbalized consent to the visit recording.  I have personally performed the services described in this document as transcribed by the above individual, and it is both accurate and complete.  Bryan Beckham MD  12/11/2021  11:19 EST       Breathing spontaneous and unlabored. Breath sounds clear and equal bilaterally with regular rhythm. Breathing spontaneous and unlabored. Breath sounds clear and equal bilaterally with regular rhythm.

## 2021-12-06 NOTE — PATIENT INSTRUCTIONS
Steps to Quit Smoking  Smoking tobacco is the leading cause of preventable death. It can affect almost every organ in the body. Smoking puts you and people around you at risk for many serious, long-lasting (chronic) diseases. Quitting smoking can be hard, but it is one of the best things that you can do for your health. It is never too late to quit.  How do I get ready to quit?  When you decide to quit smoking, make a plan to help you succeed. Before you quit:  · Pick a date to quit. Set a date within the next 2 weeks to give you time to prepare.  · Write down the reasons why you are quitting. Keep this list in places where you will see it often.  · Tell your family, friends, and co-workers that you are quitting. Their support is important.  · Talk with your doctor about the choices that may help you quit.  · Find out if your health insurance will pay for these treatments.  · Know the people, places, things, and activities that make you want to smoke (triggers). Avoid them.  What first steps can I take to quit smoking?  · Throw away all cigarettes at home, at work, and in your car.  · Throw away the things that you use when you smoke, such as ashtrays and lighters.  · Clean your car. Make sure to empty the ashtray.  · Clean your home, including curtains and carpets.  What can I do to help me quit smoking?  Talk with your doctor about taking medicines and seeing a counselor at the same time. You are more likely to succeed when you do both.  · If you are pregnant or breastfeeding, talk with your doctor about counseling or other ways to quit smoking. Do not take medicine to help you quit smoking unless your doctor tells you to do so.  To quit smoking:  Quit right away  · Quit smoking totally, instead of slowly cutting back on how much you smoke over a period of time.  · Go to counseling. You are more likely to quit if you go to counseling sessions regularly.  Take medicine  You may take medicines to help you quit. Some  medicines need a prescription, and some you can buy over-the-counter. Some medicines may contain a drug called nicotine to replace the nicotine in cigarettes. Medicines may:  · Help you to stop having the desire to smoke (cravings).  · Help to stop the problems that come when you stop smoking (withdrawal symptoms).  Your doctor may ask you to use:  · Nicotine patches, gum, or lozenges.  · Nicotine inhalers or sprays.  · Non-nicotine medicine that is taken by mouth.  Find resources  Find resources and other ways to help you quit smoking and remain smoke-free after you quit. These resources are most helpful when you use them often. They include:  · Online chats with a counselor.  · Phone quitlines.  · Printed self-help materials.  · Support groups or group counseling.  · Text messaging programs.  · Mobile phone apps. Use apps on your mobile phone or tablet that can help you stick to your quit plan. There are many free apps for mobile phones and tablets as well as websites. Examples include Quit Guide from the CDC and smokefree.gov    What things can I do to make it easier to quit?    · Talk to your family and friends. Ask them to support and encourage you.  · Call a phone quitline (9-127-QUITNOW), reach out to support groups, or work with a counselor.  · Ask people who smoke to not smoke around you.  · Avoid places that make you want to smoke, such as:  ? Bars.  ? Parties.  ? Smoke-break areas at work.  · Spend time with people who do not smoke.  · Lower the stress in your life. Stress can make you want to smoke. Try these things to help your stress:  ? Getting regular exercise.  ? Doing deep-breathing exercises.  ? Doing yoga.  ? Meditating.  ? Doing a body scan. To do this, close your eyes, focus on one area of your body at a time from head to toe. Notice which parts of your body are tense. Try to relax the muscles in those areas.  How will I feel when I quit smoking?  Day 1 to 3 weeks  Within the first 24 hours,  you may start to have some problems that come from quitting tobacco. These problems are very bad 2-3 days after you quit, but they do not often last for more than 2-3 weeks. You may get these symptoms:  · Mood swings.  · Feeling restless, nervous, angry, or annoyed.  · Trouble concentrating.  · Dizziness.  · Strong desire for high-sugar foods and nicotine.  · Weight gain.  · Trouble pooping (constipation).  · Feeling like you may vomit (nausea).  · Coughing or a sore throat.  · Changes in how the medicines that you take for other issues work in your body.  · Depression.  · Trouble sleeping (insomnia).  Week 3 and afterward  After the first 2-3 weeks of quitting, you may start to notice more positive results, such as:  · Better sense of smell and taste.  · Less coughing and sore throat.  · Slower heart rate.  · Lower blood pressure.  · Clearer skin.  · Better breathing.  · Fewer sick days.  Quitting smoking can be hard. Do not give up if you fail the first time. Some people need to try a few times before they succeed. Do your best to stick to your quit plan, and talk with your doctor if you have any questions or concerns.  Summary  · Smoking tobacco is the leading cause of preventable death. Quitting smoking can be hard, but it is one of the best things that you can do for your health.  · When you decide to quit smoking, make a plan to help you succeed.  · Quit smoking right away, not slowly over a period of time.  · When you start quitting, seek help from your doctor, family, or friends.  This information is not intended to replace advice given to you by your health care provider. Make sure you discuss any questions you have with your health care provider.  Document Revised: 09/11/2020 Document Reviewed: 03/07/2020  Elsevier Patient Education © 2021 [employer]

## 2021-12-07 LAB
25(OH)D3 SERPL-MCNC: 11.7 NG/ML (ref 30–100)
ALBUMIN SERPL-MCNC: 4.7 G/DL (ref 3.5–5.2)
ALBUMIN/GLOB SERPL: 1.8 G/DL
ALP SERPL-CCNC: 60 U/L (ref 39–117)
ALT SERPL W P-5'-P-CCNC: 22 U/L (ref 1–41)
ANION GAP SERPL CALCULATED.3IONS-SCNC: 16.4 MMOL/L (ref 5–15)
AST SERPL-CCNC: 23 U/L (ref 1–40)
BASOPHILS # BLD AUTO: 0.1 10*3/MM3 (ref 0–0.2)
BASOPHILS NFR BLD AUTO: 1.1 % (ref 0–1.5)
BILIRUB SERPL-MCNC: 0.5 MG/DL (ref 0–1.2)
BILIRUB UR QL STRIP: NEGATIVE
BUN SERPL-MCNC: 13 MG/DL (ref 8–23)
BUN/CREAT SERPL: 15.9 (ref 7–25)
CALCIUM SPEC-SCNC: 9.5 MG/DL (ref 8.6–10.5)
CHLORIDE SERPL-SCNC: 102 MMOL/L (ref 98–107)
CHOLEST SERPL-MCNC: 166 MG/DL (ref 0–200)
CLARITY UR: CLEAR
CO2 SERPL-SCNC: 22.6 MMOL/L (ref 22–29)
COLOR UR: YELLOW
CREAT SERPL-MCNC: 0.82 MG/DL (ref 0.76–1.27)
DEPRECATED RDW RBC AUTO: 42.4 FL (ref 37–54)
EOSINOPHIL # BLD AUTO: 0.11 10*3/MM3 (ref 0–0.4)
EOSINOPHIL NFR BLD AUTO: 1.2 % (ref 0.3–6.2)
ERYTHROCYTE [DISTWIDTH] IN BLOOD BY AUTOMATED COUNT: 12.3 % (ref 12.3–15.4)
GFR SERPL CREATININE-BSD FRML MDRD: 95 ML/MIN/1.73
GLOBULIN UR ELPH-MCNC: 2.6 GM/DL
GLUCOSE SERPL-MCNC: 90 MG/DL (ref 65–99)
GLUCOSE UR STRIP-MCNC: NEGATIVE MG/DL
HCT VFR BLD AUTO: 42.2 % (ref 37.5–51)
HDLC SERPL-MCNC: 75 MG/DL (ref 40–60)
HGB BLD-MCNC: 14.8 G/DL (ref 13–17.7)
HGB UR QL STRIP.AUTO: NEGATIVE
IMM GRANULOCYTES # BLD AUTO: 0.03 10*3/MM3 (ref 0–0.05)
IMM GRANULOCYTES NFR BLD AUTO: 0.3 % (ref 0–0.5)
KETONES UR QL STRIP: NEGATIVE
LDLC SERPL CALC-MCNC: 76 MG/DL (ref 0–100)
LDLC/HDLC SERPL: 0.99 {RATIO}
LEUKOCYTE ESTERASE UR QL STRIP.AUTO: NEGATIVE
LYMPHOCYTES # BLD AUTO: 2.57 10*3/MM3 (ref 0.7–3.1)
LYMPHOCYTES NFR BLD AUTO: 28.5 % (ref 19.6–45.3)
MCH RBC QN AUTO: 33.2 PG (ref 26.6–33)
MCHC RBC AUTO-ENTMCNC: 35.1 G/DL (ref 31.5–35.7)
MCV RBC AUTO: 94.6 FL (ref 79–97)
MONOCYTES # BLD AUTO: 0.97 10*3/MM3 (ref 0.1–0.9)
MONOCYTES NFR BLD AUTO: 10.7 % (ref 5–12)
NEUTROPHILS NFR BLD AUTO: 5.25 10*3/MM3 (ref 1.7–7)
NEUTROPHILS NFR BLD AUTO: 58.2 % (ref 42.7–76)
NITRITE UR QL STRIP: NEGATIVE
NRBC BLD AUTO-RTO: 0 /100 WBC (ref 0–0.2)
PH UR STRIP.AUTO: 6 [PH] (ref 5–8)
PLATELET # BLD AUTO: 257 10*3/MM3 (ref 140–450)
PMV BLD AUTO: 9.5 FL (ref 6–12)
POTASSIUM SERPL-SCNC: 4 MMOL/L (ref 3.5–5.2)
PROT SERPL-MCNC: 7.3 G/DL (ref 6–8.5)
PROT UR QL STRIP: NEGATIVE
PSA SERPL-MCNC: 2.69 NG/ML (ref 0–4)
RBC # BLD AUTO: 4.46 10*6/MM3 (ref 4.14–5.8)
SODIUM SERPL-SCNC: 141 MMOL/L (ref 136–145)
SP GR UR STRIP: 1.02 (ref 1–1.03)
T4 FREE SERPL-MCNC: 1.02 NG/DL (ref 0.93–1.7)
TRIGL SERPL-MCNC: 82 MG/DL (ref 0–150)
TSH SERPL DL<=0.05 MIU/L-ACNC: 2.52 UIU/ML (ref 0.27–4.2)
UROBILINOGEN UR QL STRIP: NORMAL
VIT B12 BLD-MCNC: 315 PG/ML (ref 211–946)
VLDLC SERPL-MCNC: 15 MG/DL (ref 5–40)
WBC NRBC COR # BLD: 9.03 10*3/MM3 (ref 3.4–10.8)

## 2021-12-07 NOTE — PROGRESS NOTES
Your labs indicate your Vit D level is low.  We will call in capsules of the 50,000 units each and I would ask that you take one a week for the next 12 weeks.   After those 12 weeks are completed then  buy otc Vit D at 2000 units each  and take one daily.   Lets recheck the Vit D level in about 5-6 months..

## 2021-12-15 RX ORDER — ERGOCALCIFEROL 1.25 MG/1
50000 CAPSULE ORAL
Qty: 12 CAPSULE | Refills: 1 | Status: SHIPPED | OUTPATIENT
Start: 2021-12-15 | End: 2022-03-03

## 2022-03-23 NOTE — TELEPHONE ENCOUNTER
Incoming Refill Request      Medication requested (name and dose):   nitroglycerin (NITROSTAT) 0.4 MG SL tablet        lisinopril (PRINIVIL,ZESTRIL) 5 MG tablet  5 mg, Daily     metoprolol succinate XL (TOPROL-XL) 50 MG 24 hr tablet  50 mg, Daily     clopidogrel (PLAVIX) 75 MG tablet  75 mg, Daily     atorvastatin (LIPITOR) 80 MG tablet  80 mg, Daily           Pharmacy where request should be sent: [address] - [phone] [place] [phone] [phone]    Additional details provided by patient: PATIENT SAID HE WILL BE OUT OF MEDICATION OVER THE WEEKEND, HE IS WANTING TO SEE IF THESE CAN BE FILLED BEFORE HIS APPOINTMENT Monday, 12/06 WITH DR. RAYMUNDO Harris call back number: [phone]    Does the patient have less than a 3 day supply:  [x] Yes  [] No    Elizabeth Akins Rep  12/02/21, 15:27 EST             Surgeon Performing Repair (Optional): Dr. Ramirez

## 2022-08-05 ENCOUNTER — TELEPHONE (OUTPATIENT)
Dept: FAMILY MEDICINE CLINIC | Facility: CLINIC | Age: 62
End: 2022-08-05

## 2022-08-05 NOTE — TELEPHONE ENCOUNTER
Caller: Zia Lott    Relationship to patient: Self    Best call back number: [phone]    Date of exposure: n/a    Date of positive COVID19 test: 08/05/22, HOME TEST    Date if possible COVID19 exposure: N/A    COVID19 symptoms: HEAD, CHEST COLD SYMPTOMS, FATIGUE    Date of initial quarantine: 08/05/22    Additional information or concerns: PATIENT STARTED FEELING BAD THIS MORNING, HE SAID HE IS WANTING TO SEE WHAT HE SHOULD BE DOING     What is the patients preferred pharmacy: [facility] [address] [place] [address] ROAD - [phone] [place] [phone] [phone]

## 2022-08-08 NOTE — TELEPHONE ENCOUNTER
LVM FOR PATIENT AT 10:50.    FOR HUB TO SAY: Treat symptoms with OTC Mucinex dm tylenol, vitc 2000 vit d 2000 zinc 50mg

## 2022-11-03 RX ORDER — EZETIMIBE 10 MG/1
TABLET ORAL
Qty: 90 TABLET | Refills: 0 | Status: SHIPPED | OUTPATIENT
Start: 2022-11-03 | End: 2023-01-04

## 2022-12-07 ENCOUNTER — OFFICE VISIT (OUTPATIENT)
Dept: FAMILY MEDICINE CLINIC | Facility: CLINIC | Age: 62
End: 2022-12-07

## 2022-12-07 VITALS
HEART RATE: 74 BPM | WEIGHT: 173 LBS | SYSTOLIC BLOOD PRESSURE: 120 MMHG | DIASTOLIC BLOOD PRESSURE: 82 MMHG | RESPIRATION RATE: 16 BRPM | BODY MASS INDEX: 24.77 KG/M2 | HEIGHT: 70 IN | OXYGEN SATURATION: 97 %

## 2022-12-07 DIAGNOSIS — Z12.5 SPECIAL SCREENING FOR MALIGNANT NEOPLASM OF PROSTATE: ICD-10-CM

## 2022-12-07 DIAGNOSIS — I25.10 CHRONIC CORONARY ARTERY DISEASE: Primary | ICD-10-CM

## 2022-12-07 DIAGNOSIS — F17.210 CIGARETTE SMOKER: ICD-10-CM

## 2022-12-07 DIAGNOSIS — I10 PRIMARY HYPERTENSION: ICD-10-CM

## 2022-12-07 DIAGNOSIS — I25.5 CARDIOMYOPATHY, ISCHEMIC: ICD-10-CM

## 2022-12-07 DIAGNOSIS — F41.1 GAD (GENERALIZED ANXIETY DISORDER): ICD-10-CM

## 2022-12-07 DIAGNOSIS — E78.2 MIXED HYPERLIPIDEMIA: ICD-10-CM

## 2022-12-07 PROCEDURE — 99214 OFFICE O/P EST MOD 30 MIN: CPT | Performed by: FAMILY MEDICINE

## 2022-12-07 RX ORDER — VARENICLINE TARTRATE 25 MG
KIT ORAL
Qty: 53 TABLET | Refills: 0 | Status: SHIPPED | OUTPATIENT
Start: 2022-12-07 | End: 2023-03-30

## 2022-12-07 NOTE — PROGRESS NOTES
"Chief Complaint  Annual Exam    Subjective      Zia Lott presents to [facility]  History of Present Illness  For annual exam.  The patient presents today for an annual visit.    The patient states that he is doing well overall. He denies any chest pain or angina. He states that he is able to perform all of his daily activities without difficulty. 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    He states that he has got three doses of the COVID-19 vaccine and inquires if he should get the fourth dose. The patient asks whether or not he should have a shingles vaccine.     The patient states that he stays up to date on his colonoscopy and gets them every 3 years. He reports that the colonoscopy revealed polyps; therefore, he follows-up on it regularly.    The patient states that he is not managing his smoking habit well, and smokes 1 pack of cigarettes per day. He admits that he has tried to quit several times and is willing to try Chantix again. He reports that he noticed a significant different when he tried Chantix in the past; however, he ran out of Chantix and was unable to obtain more of it. He denies using Wellbutrin and NicoDerm patch in the past for smoking cessation.     He denies getting his bloodwork done prior to this visit but states that he got it done at his last visit.      Objective      Vital Signs:  /82 (BP Location: Right arm, Cuff Size: Adult)   Pulse 74   Resp 16   Ht 177.8 cm (70\")   Wt 78.5 kg (173 lb)   SpO2 97%   BMI 24.82 kg/m²   Estimated body mass index is 24.82 kg/m² as calculated from the following:    Height as of this encounter: 177.8 cm (70\").    Weight as of this encounter: 78.5 kg (173 lb).    BMI is within normal parameters. No other follow-up for " BMI required.      Physical Exam  Constitutional:       Appearance: Normal appearance. He is well-developed and normal weight.   HENT:      Head: Normocephalic and atraumatic.      Right Ear: Tympanic membrane, ear canal and external ear normal.      Left Ear: Tympanic membrane, ear canal and external ear normal.      Nose: Nose normal.      Mouth/Throat:      Mouth: Mucous membranes are moist.      Pharynx: Oropharynx is clear. No oropharyngeal exudate.   Eyes:      Extraocular Movements: Extraocular movements intact.      Conjunctiva/sclera: Conjunctivae normal.      Pupils: Pupils are equal, round, and reactive to light.   Cardiovascular:      Rate and Rhythm: Normal rate and regular rhythm.      Pulses: Normal pulses.      Heart sounds: Normal heart sounds.   Pulmonary:      Effort: Pulmonary effort is normal.      Breath sounds: Normal breath sounds.   Abdominal:      General: Bowel sounds are normal.      Palpations: Abdomen is soft.   Musculoskeletal:         General: Normal range of motion.      Cervical back: Normal range of motion and neck supple.   Skin:     General: Skin is warm and dry.   Neurological:      General: No focal deficit present.      Mental Status: He is alert and oriented to person, place, and time. Mental status is at baseline.   Psychiatric:         Mood and Affect: Mood normal.         Behavior: Behavior normal.         Thought Content: Thought content normal.         Judgment: Judgment normal.        Assessment and Plan   1. Chronic coronary artery disease  - Zia is a 61-year-old white male with known coronary artery disease who presents today for his annual checkup. He had a significantly good year. He has not had any angina and he is working full-time, staying busy and physically active and feels reasonably well. Zia's biggest health risk is the fact that he still smokes, so we have finally convinced him to try again to quit smoking. He is going to try Chantix. Otherwise, he  is up to date with his immunizations, his colonoscopy and we will continue his other medicines as listed.    2. Cardiomyopathy - Ischemic  - Zia has a history of several myocardial infarctions and he has an ischemic cardiomyopathy. His cardiac output is reasonable, approximately around 40% when last checked and he is tolerating that quite well. He is working full time without any dyspnea, edema, and overall doing quite well. We will continue his current medicines as listed.    3. Primary hypertension  - The patient's blood pressure today was very good at 120/82 mmHg. He takes Prinivil 5 mg per day, metoprolol XL 50 mg per day and those two have kept his blood pressure excellent. We will continue these.    4. Mixed hyperlipidemia  - The patient's lipids have been reasonably well controlled with Zetia. He can not tolerate a statin. Every time we have tried on, he has gotten extremely sick experiencing muscle aches and muscle enzyme elevation. We will evaluate his lipid panel with the Zetia this year. He might be a candidate for injectable or an infusion or a subcutaneous injection of one of the newer medications for cholesterol; however, I am unsure if his insurance was willing to cover upon inquiring in the past. We will evaluate his recent lipid panel in 2022 and will proceed accordingly.    5. Generalized anxiety disorder  - Zia is a known patient of mild anxiety. He is managing it well with meditation and exercise.    6. Cigarette smoker  - Zia has been a cigarette smoker for several years. He has tried to quit multiple times and the closest he got to and he sustained stop time was with Chantix. He is willing to try again, so I gave him prescription for a starter pack of Chantix and then once that is over, he will be on extended 1 mg two times per day dosing for the next couple of years.    7. Special screening for malignant neoplasm of the prostate  - The patient will have a prostate-specific antigen  test done today for screening purposes. Patient has had his colonoscopy. He does have polyps, so he is anticipating the need for another colon study in the next couple of years.     I spent 33 minutes caring for Zia on this date of service. This time includes time spent by me in the following activities:preparing for the visit, reviewing tests, obtaining and/or reviewing a separately obtained history, performing a medically appropriate examination and/or evaluation , counseling and educating the patient/family/caregiver, ordering medications, tests, or procedures, referring and communicating with other health care professionals , documenting information in the medical record, independently interpreting results and communicating that information with the patient/family/caregiver and care coordination     Follow Up    Transcribed from ambient dictation for Bryan Beckham MD by Yadira Hauser.  12/07/22   19:39 EST    Patient or patient representative verbalized consent to the visit recording.  I have personally performed the services described in this document as transcribed by the above individual, and it is both accurate and complete.  Bryan Beckham MD  12/11/2022  19:30 EST     Return in about 1 year (around 12/7/2023) for Recheck.  Patient was given instructions and counseling regarding his condition or for health maintenance advice. Please see specific information pulled into the AVS if appropriate.

## 2022-12-08 ENCOUNTER — LAB (OUTPATIENT)
Dept: FAMILY MEDICINE CLINIC | Facility: CLINIC | Age: 62
End: 2022-12-08

## 2022-12-08 DIAGNOSIS — I25.10 CHRONIC CORONARY ARTERY DISEASE: Primary | ICD-10-CM

## 2022-12-08 DIAGNOSIS — I25.5 CARDIOMYOPATHY, ISCHEMIC: ICD-10-CM

## 2022-12-08 DIAGNOSIS — I10 PRIMARY HYPERTENSION: ICD-10-CM

## 2022-12-08 PROCEDURE — 81001 URINALYSIS AUTO W/SCOPE: CPT | Performed by: FAMILY MEDICINE

## 2022-12-08 PROCEDURE — 36415 COLL VENOUS BLD VENIPUNCTURE: CPT | Performed by: FAMILY MEDICINE

## 2022-12-08 PROCEDURE — 80050 GENERAL HEALTH PANEL: CPT | Performed by: FAMILY MEDICINE

## 2022-12-08 PROCEDURE — G0103 PSA SCREENING: HCPCS | Performed by: FAMILY MEDICINE

## 2022-12-08 PROCEDURE — 84439 ASSAY OF FREE THYROXINE: CPT | Performed by: FAMILY MEDICINE

## 2022-12-08 PROCEDURE — 83036 HEMOGLOBIN GLYCOSYLATED A1C: CPT | Performed by: FAMILY MEDICINE

## 2022-12-08 PROCEDURE — 82607 VITAMIN B-12: CPT | Performed by: FAMILY MEDICINE

## 2022-12-08 PROCEDURE — 80061 LIPID PANEL: CPT | Performed by: FAMILY MEDICINE

## 2022-12-09 LAB
ALBUMIN SERPL-MCNC: 4 G/DL (ref 3.5–5.2)
ALBUMIN/GLOB SERPL: 1.4 G/DL
ALP SERPL-CCNC: 54 U/L (ref 39–117)
ALT SERPL W P-5'-P-CCNC: 17 U/L (ref 1–41)
ANION GAP SERPL CALCULATED.3IONS-SCNC: 13.2 MMOL/L (ref 5–15)
AST SERPL-CCNC: 21 U/L (ref 1–40)
BACTERIA UR QL AUTO: NORMAL /HPF
BASOPHILS # BLD AUTO: 0.07 10*3/MM3 (ref 0–0.2)
BASOPHILS NFR BLD AUTO: 0.8 % (ref 0–1.5)
BILIRUB SERPL-MCNC: 0.7 MG/DL (ref 0–1.2)
BILIRUB UR QL STRIP: NEGATIVE
BUN SERPL-MCNC: 15 MG/DL (ref 8–23)
BUN/CREAT SERPL: 15.6 (ref 7–25)
CALCIUM SPEC-SCNC: 9.7 MG/DL (ref 8.6–10.5)
CHLORIDE SERPL-SCNC: 101 MMOL/L (ref 98–107)
CHOLEST SERPL-MCNC: 144 MG/DL (ref 0–200)
CLARITY UR: CLEAR
CO2 SERPL-SCNC: 24.8 MMOL/L (ref 22–29)
COLOR UR: YELLOW
CREAT SERPL-MCNC: 0.96 MG/DL (ref 0.76–1.27)
DEPRECATED RDW RBC AUTO: 41.4 FL (ref 37–54)
EGFRCR SERPLBLD CKD-EPI 2021: 88.8 ML/MIN/1.73
EOSINOPHIL # BLD AUTO: 0.22 10*3/MM3 (ref 0–0.4)
EOSINOPHIL NFR BLD AUTO: 2.5 % (ref 0.3–6.2)
ERYTHROCYTE [DISTWIDTH] IN BLOOD BY AUTOMATED COUNT: 11.8 % (ref 12.3–15.4)
GLOBULIN UR ELPH-MCNC: 2.8 GM/DL
GLUCOSE SERPL-MCNC: 66 MG/DL (ref 65–99)
GLUCOSE UR STRIP-MCNC: NEGATIVE MG/DL
HBA1C MFR BLD: 5.5 % (ref 3.5–5.6)
HCT VFR BLD AUTO: 42.6 % (ref 37.5–51)
HDLC SERPL-MCNC: 74 MG/DL (ref 40–60)
HGB BLD-MCNC: 14.5 G/DL (ref 13–17.7)
HGB UR QL STRIP.AUTO: NEGATIVE
HYALINE CASTS UR QL AUTO: NORMAL /LPF
IMM GRANULOCYTES # BLD AUTO: 0.03 10*3/MM3 (ref 0–0.05)
IMM GRANULOCYTES NFR BLD AUTO: 0.3 % (ref 0–0.5)
KETONES UR QL STRIP: NEGATIVE
LDLC SERPL CALC-MCNC: 53 MG/DL (ref 0–100)
LDLC/HDLC SERPL: 0.7 {RATIO}
LEUKOCYTE ESTERASE UR QL STRIP.AUTO: ABNORMAL
LYMPHOCYTES # BLD AUTO: 2.87 10*3/MM3 (ref 0.7–3.1)
LYMPHOCYTES NFR BLD AUTO: 32.8 % (ref 19.6–45.3)
MCH RBC QN AUTO: 33 PG (ref 26.6–33)
MCHC RBC AUTO-ENTMCNC: 34 G/DL (ref 31.5–35.7)
MCV RBC AUTO: 96.8 FL (ref 79–97)
MONOCYTES # BLD AUTO: 0.95 10*3/MM3 (ref 0.1–0.9)
MONOCYTES NFR BLD AUTO: 10.8 % (ref 5–12)
NEUTROPHILS NFR BLD AUTO: 4.62 10*3/MM3 (ref 1.7–7)
NEUTROPHILS NFR BLD AUTO: 52.8 % (ref 42.7–76)
NITRITE UR QL STRIP: NEGATIVE
NRBC BLD AUTO-RTO: 0 /100 WBC (ref 0–0.2)
PH UR STRIP.AUTO: 6 [PH] (ref 5–8)
PLATELET # BLD AUTO: 232 10*3/MM3 (ref 140–450)
PMV BLD AUTO: 9.4 FL (ref 6–12)
POTASSIUM SERPL-SCNC: 4.3 MMOL/L (ref 3.5–5.2)
PROT SERPL-MCNC: 6.8 G/DL (ref 6–8.5)
PROT UR QL STRIP: NEGATIVE
PSA SERPL-MCNC: 2.96 NG/ML (ref 0–4)
RBC # BLD AUTO: 4.4 10*6/MM3 (ref 4.14–5.8)
RBC # UR STRIP: NORMAL /HPF
REF LAB TEST METHOD: NORMAL
SODIUM SERPL-SCNC: 139 MMOL/L (ref 136–145)
SP GR UR STRIP: 1.02 (ref 1–1.03)
SQUAMOUS #/AREA URNS HPF: NORMAL /HPF
T4 FREE SERPL-MCNC: 1.12 NG/DL (ref 0.93–1.7)
TRIGL SERPL-MCNC: 91 MG/DL (ref 0–150)
TSH SERPL DL<=0.05 MIU/L-ACNC: 3.52 UIU/ML (ref 0.27–4.2)
UROBILINOGEN UR QL STRIP: ABNORMAL
VIT B12 BLD-MCNC: 387 PG/ML (ref 211–946)
VLDLC SERPL-MCNC: 17 MG/DL (ref 5–40)
WBC # UR STRIP: NORMAL /HPF
WBC NRBC COR # BLD: 8.76 10*3/MM3 (ref 3.4–10.8)

## 2023-01-04 DIAGNOSIS — Z12.5 SPECIAL SCREENING FOR MALIGNANT NEOPLASM OF PROSTATE: ICD-10-CM

## 2023-01-04 DIAGNOSIS — I10 PRIMARY HYPERTENSION: ICD-10-CM

## 2023-01-04 DIAGNOSIS — F17.210 CIGARETTE SMOKER: ICD-10-CM

## 2023-01-04 DIAGNOSIS — Z56.6 STRESS AT WORK: ICD-10-CM

## 2023-01-04 DIAGNOSIS — E78.2 MIXED HYPERLIPIDEMIA: ICD-10-CM

## 2023-01-04 DIAGNOSIS — Z98.61 STATUS POST PERCUTANEOUS TRANSLUMINAL CORONARY ANGIOPLASTY: ICD-10-CM

## 2023-01-04 DIAGNOSIS — I25.10 CHRONIC CORONARY ARTERY DISEASE: ICD-10-CM

## 2023-01-04 RX ORDER — METOPROLOL SUCCINATE 50 MG/1
TABLET, EXTENDED RELEASE ORAL
Qty: 90 TABLET | Refills: 0 | Status: SHIPPED | OUTPATIENT
Start: 2023-01-04

## 2023-01-04 RX ORDER — CLOPIDOGREL BISULFATE 75 MG/1
TABLET ORAL
Qty: 90 TABLET | Refills: 0 | Status: SHIPPED | OUTPATIENT
Start: 2023-01-04

## 2023-01-04 RX ORDER — ATORVASTATIN CALCIUM 80 MG/1
TABLET, FILM COATED ORAL
Qty: 90 TABLET | Refills: 0 | Status: SHIPPED | OUTPATIENT
Start: 2023-01-04

## 2023-01-04 RX ORDER — LISINOPRIL 5 MG/1
TABLET ORAL
Qty: 90 TABLET | Refills: 0 | Status: SHIPPED | OUTPATIENT
Start: 2023-01-04

## 2023-01-04 RX ORDER — EZETIMIBE 10 MG/1
TABLET ORAL
Qty: 90 TABLET | Refills: 0 | Status: SHIPPED | OUTPATIENT
Start: 2023-01-04

## 2023-01-04 SDOH — HEALTH STABILITY - MENTAL HEALTH: OTHER PHYSICAL AND MENTAL STRAIN RELATED TO WORK: Z56.6

## 2023-02-20 ENCOUNTER — TELEPHONE (OUTPATIENT)
Dept: CARDIOLOGY | Facility: CLINIC | Age: 63
End: 2023-02-20
Payer: COMMERCIAL

## 2023-02-20 NOTE — TELEPHONE ENCOUNTER
Caller: MATHEW MELCHOR    Relationship: Emergency Contact    Best call back number: [phone]    What is the best time to reach you: ANY     Who are you requesting to speak with (clinical staff, provider,  specific staff member): ANY         What was the call regarding: PT HAD A HEART ATTACK IN THE [facility]. HE HAD AN ANGIOGRAM IN THE HOSPITAL AND WAS TOLD HE WOULD NEED OPEN HEART SURGERY. PT NEEDS APPT WITH DR. CASSY DAVILA. PLEASE CALL BACK TO SCHEDULE.     Do you require a callback: YES

## 2023-02-28 ENCOUNTER — OFFICE VISIT (OUTPATIENT)
Dept: CARDIOLOGY | Facility: CLINIC | Age: 63
End: 2023-02-28
Payer: COMMERCIAL

## 2023-02-28 ENCOUNTER — TELEPHONE (OUTPATIENT)
Dept: FAMILY MEDICINE CLINIC | Facility: CLINIC | Age: 63
End: 2023-02-28
Payer: COMMERCIAL

## 2023-02-28 VITALS
DIASTOLIC BLOOD PRESSURE: 79 MMHG | WEIGHT: 175 LBS | HEIGHT: 70 IN | OXYGEN SATURATION: 99 % | HEART RATE: 62 BPM | BODY MASS INDEX: 25.05 KG/M2 | SYSTOLIC BLOOD PRESSURE: 123 MMHG

## 2023-02-28 DIAGNOSIS — I10 ESSENTIAL HYPERTENSION: ICD-10-CM

## 2023-02-28 DIAGNOSIS — I44.7 LBBB (LEFT BUNDLE BRANCH BLOCK): ICD-10-CM

## 2023-02-28 DIAGNOSIS — Z95.820 STATUS POST ANGIOPLASTY WITH STENT: Primary | ICD-10-CM

## 2023-02-28 DIAGNOSIS — I25.5 ISCHEMIC CARDIOMYOPATHY: ICD-10-CM

## 2023-02-28 DIAGNOSIS — E78.5 DYSLIPIDEMIA: ICD-10-CM

## 2023-02-28 PROCEDURE — 99214 OFFICE O/P EST MOD 30 MIN: CPT | Performed by: INTERNAL MEDICINE

## 2023-02-28 NOTE — PROGRESS NOTES
Encounter Date:02/28/2023  Last seen 8/16/2021      Patient ID: Zia Lott is a 62 y.o. male.    Chief Complaint:  Status post stent  Left bundle branch block  Recent myocardial infarction     History of Present Illness  Patient recently was in the [facility].  After staying for 1 week patient was supposed to return at that morning developed severe chest pain associated with shortness of breath and profuse sweating.  Patient was seen in the clinic locally and subsequently was flown to [facility].  He had cardiac catheterization.  Details of cardiac catheter not available.  They are concerned about intervening and suggested further follow[place]up in [place].    Since he was released from the hospital the patient has been without any chest discomfort ,shortness of breath, palpitations, dizziness or syncope.  Denies having any headache ,abdominal pain ,nausea, vomiting , diarrhea constipation, loss of weight or loss of appetite.  Denies having any excessive bruising ,hematuria or blood in the stool.    Review of all systems negative except as indicated.    Reviewed ROS.    Assessment and Plan         ///////////////////////  Impression  ==========  - Recent myocardial infarction in [facility] 2/18/2023  Cardiac cath 2/20/2023 (details not available)  Patient apparently was told he has complex disease and not comfortable in performing intervention in the [facility].     - status post stent to LAD 05/30/2015 and 06/20/2013.     Status post subendocardial myocardial infarction 05/30/2015 prior to stent placement.     Cardiac catheterization 2015 revealed -  Previously placed LAD stent was patent.  Distal LAD has 50% disease.  Chronic and total occlusion of 1st marginal branch.  Distal marginal branch was filling from RCA.  Circumflex coronary artery has 90-95%  distal to the origin of marginal branch.  RCA is a large and dominant vessel that has diffuse 50% disease.     Echocardiogram 09/11/2018 revealed severe left ventricular  dysfunction with ejection fraction of 25%.  Moderate mitral and tricuspid regurgitation is present.     Stress Cardiolite test 09/11/2018 revealed significant anterior apical inferior and poster lateral infarction and ischemia.       -status post anterior wall myocardial infarction 06/2013 and stent placement to LAD 06/2013.       -ischemic cardiomyopathy.  Recent left ventricular ejection fraction was 25-30%.       -smoker -patient was advised to abstain from smoking.        -chronic left bundle-branch block      -hypertension and dyslipidemia      -history of drinking alcohol     -no known allergies  ===========   Plan  =============  Status post myocardial infarction 2/18/2023 and cardiac cath 2/20/2023  Patient recently was in the [facility].  After staying for 1 week patient was supposed to return at that morning developed severe chest pain associated with shortness of breath and profuse sweating.  Patient was seen in the clinic locally and subsequently was flown to [facility].  He had cardiac catheterization.  Details of cardiac catheter not available.  They are concerned about intervening and suggested further follow[place]up [place].    Patient is not having symptoms at this time.    Patient now not having angina pectoris or congestive heart failure.    Patient brought CD and we will try to review the films.    Follow-up in the office in 2 weeks with an echocardiogram to assess left ventricular function.    patient has history of ischemic cardiomyopathy.  Abstinence from smoking     Medications were reviewed and updated.  Patient is on aspirin 81 mg a day Plavix 75 mg a day Zetia lisinopril 5 mg a day metoprolol XL 25 mg a day.    Stay off work till released.    Further plan will depend on patient's Progress.  ///////////                    Diagnosis Plan   1. Status post angioplasty with stent  Adult Transthoracic Echo Complete W/ Cont if Necessary Per Protocol      2. LBBB (left bundle branch block)  Adult  Transthoracic Echo Complete W/ Cont if Necessary Per Protocol      3. Dyslipidemia  Adult Transthoracic Echo Complete W/ Cont if Necessary Per Protocol      4. Essential hypertension  Adult Transthoracic Echo Complete W/ Cont if Necessary Per Protocol      5. Ischemic cardiomyopathy  Adult Transthoracic Echo Complete W/ Cont if Necessary Per Protocol      LAB RESULTS (LAST 7 DAYS)    CBC        BMP        CMP         BNP        TROPONIN        CoAg        Creatinine Clearance  CrCl cannot be calculated (Patient's most recent lab result is older than the maximum 30 days allowed.).    ABG        Radiology  No radiology results for the last day                The following portions of the patient's history were reviewed and updated as appropriate: allergies, current medications, past family history, past medical history, past social history, past surgical history and problem list.    Review of Systems   Constitutional: Negative for malaise/fatigue.   Cardiovascular: Negative for chest pain, dyspnea on exertion, leg swelling and palpitations.   Respiratory: Negative for cough and shortness of breath.    Gastrointestinal: Negative for abdominal pain, nausea and vomiting.   Neurological: Negative for dizziness, focal weakness, headaches, light-headedness and numbness.   All other systems reviewed and are negative.        Current Outpatient Medications:   •  aspirin 81 MG EC tablet, Take 1 tablet by mouth Daily., Disp: , Rfl:   •  atorvastatin (LIPITOR) 80 MG tablet, Take 1 tablet by mouth once daily for 90 days, Disp: 90 tablet, Rfl: 0  •  clopidogrel (PLAVIX) 75 MG tablet, Take 1 tablet by mouth once daily for 90 days, Disp: 90 tablet, Rfl: 0  •  ezetimibe (ZETIA) 10 MG tablet, Take 1 tablet by mouth once daily, Disp: 90 tablet, Rfl: 0  •  lisinopril (PRINIVIL,ZESTRIL) 5 MG tablet, Take 1 tablet by mouth once daily for 90 days, Disp: 90 tablet, Rfl: 0  •  metoprolol succinate XL (TOPROL-XL) 50 MG 24 hr tablet, Take 1  "tablet by mouth once daily for 90 days, Disp: 90 tablet, Rfl: 0  •  nitroglycerin (NITROSTAT) 0.4 MG SL tablet, NITROSTAT 0.4 MG SUBL, Disp: , Rfl:   •  Varenicline Tartrate 0.5 MG X 11 & 1 MG X 42 tablet, Take 0.5 mg one daily on days 1-2 and 0.5 mg twice daily on days 4-7. Then 1 mg bid, Disp: 53 tablet, Rfl: 0  •  VITAMIN D PO, Take  by mouth., Disp: , Rfl:     No Known Allergies    Family History   Problem Relation Age of Onset   • Diabetes Father    • Heart failure Father        Past Surgical History:   Procedure Laterality Date   • CARDIAC CATHETERIZATION  05/30/2015   • COLONOSCOPY     • CORONARY ANGIOPLASTY WITH STENT PLACEMENT  05/30/2015    stent lad and circumflex distal to the marginal branch   • CORONARY ANGIOPLASTY WITH STENT PLACEMENT  06/2013    stent to lad       Past Medical History:   Diagnosis Date   • BBB (bundle branch block)     lt bundle branch block   • CAD (coronary artery disease)    • Cardiomyopathy (HCC)    • CHD (coronary heart disease)    • Congenital heart disease    • Heart murmur    • Hyperlipidemia    • Hypertension    • Myocardial infarction (HCC)     acute   • Sleep apnea        Family History   Problem Relation Age of Onset   • Diabetes Father    • Heart failure Father        Social History     Socioeconomic History   • Marital status:    Tobacco Use   • Smoking status: Some Days     Packs/day: 0.50     Types: Cigarettes     Passive exposure: Current   • Smokeless tobacco: Never   Vaping Use   • Vaping Use: Never used   Substance and Sexual Activity   • Alcohol use: Not Currently   • Drug use: No   • Sexual activity: Yes     Partners: Female         Procedures      Objective:       Physical Exam    /79 (BP Location: Right arm, Patient Position: Sitting, Cuff Size: Adult)   Pulse 62   Ht 177.8 cm (70\")   Wt 79.4 kg (175 lb)   SpO2 99%   BMI 25.11 kg/m²   The patient is alert, oriented and in no distress.    Vital signs as noted above.    Head and neck revealed " no carotid bruits or jugular venous distension.  No thyromegaly or lymphadenopathy is present.    Lungs clear.  No wheezing.  Breath sounds are normal bilaterally.    Heart normal first and second heart sounds.  No murmur..  No pericardial rub is present.  No gallop is present.    Abdomen soft and nontender.  No organomegaly is present.    Extremities revealed good peripheral pulses without any pedal edema.    Skin warm and dry.    Musculoskeletal system is grossly normal.    CNS grossly normal.    Reviewed and updated.

## 2023-03-02 NOTE — TELEPHONE ENCOUNTER
Dr Beckham said he does not need to see him until after cardiology is done unless there are other concerns like kidney function or diabetes but if this is all due to heart then just see cardiology.

## 2023-03-05 NOTE — PROGRESS NOTES
Encounter Date:03/14/2023    Last seen 2/28/2023      Patient ID: Zia Lott is a 62 y.o. male.    Chief Complaint:    Status post stent  Left bundle branch block  Recent myocardial infarction     History of Present Illness  Patient recently was seen with following history.  Reviewed and updated.  Patient is having mild shortness of breath with activity.    Patient recently was in the [facility].  After staying for 1 week patient was supposed to return at that morning developed severe chest pain associated with shortness of breath and profuse sweating.  Patient was seen in the clinic locally and subsequently was flown to [facility].  He had cardiac catheterization.  Details of cardiac catheter not available.  They are concerned about intervening and suggested further follow-up in [place].     Since he was released from the hospital the patient has been without any chest discomfort ,shortness of breath, palpitations, dizziness or syncope.  Denies having any headache ,abdominal pain ,nausea, vomiting , diarrhea constipation, loss of weight or loss of appetite.  Denies having any excessive bruising ,hematuria or blood in the stool.     Review of all systems negative except as indicated.     Reviewed ROS.    Assessment and Plan         ///////////////////////  Impression  ==========  - Recent myocardial infarction in [facility] 2/18/2023  Cardiac cath 2/20/2023 (details not available)  Patient apparently was told he has complex disease and not comfortable in performing intervention in the [facility].      - status post stent to LAD 05/30/2015 and 06/20/2013.     Status post subendocardial myocardial infarction 05/30/2015 prior to stent placement.    - Ischemic cardiomyopathy.-Ejection fraction 15%.-3/13/2023    Echocardiogram 3/13/2023 revealed  Structurally and functionally normal cardiac valves.  Significant left ventricle enlargement with severe and diffuse hypocontractility with ejection fraction of 15%.     Cardiac  catheterization 2015 revealed -  Previously placed LAD stent was patent.  Distal LAD has 50% disease.  Chronic and total occlusion of 1st marginal branch.  Distal marginal branch was filling from RCA.  Circumflex coronary artery has 90-95%  distal to the origin of marginal branch.  RCA is a large and dominant vessel that has diffuse 50% disease.     Echocardiogram 09/11/2018 revealed severe left ventricular dysfunction with ejection fraction of 25%.  Moderate mitral and tricuspid regurgitation is present.     Stress Cardiolite test 09/11/2018 revealed significant anterior apical inferior and poster lateral infarction and ischemia.       -status post anterior wall myocardial infarction 06/2013 and stent placement to LAD 06/2013.       -ischemic cardiomyopathy.  Recent left ventricular ejection fraction was 25-30%.       -smoker -patient was advised to abstain from smoking.        -chronic left bundle-branch block      -hypertension and dyslipidemia      -history of drinking alcohol     -no known allergies  ===========   Plan  =============  Status post myocardial infarction 2/18/2023 and cardiac cath 2/20/2023  Patient recently was in the [facility].  After staying for 1 week patient was supposed to return at that morning developed severe chest pain associated with shortness of breath and profuse sweating.  Patient was seen in the clinic locally and subsequently was flown to [facility].  He had cardiac catheterization.  Details of cardiac catheter not available.  They are concerned about intervening and suggested further follow-up in [place].     Patient is not having symptoms at this time except for mild shortness of breath..     Patient now not having angina pectoris or congestive heart failure.     Patient brought CD.  Have made attempts by me and other staff in cardiac Cath Lab to review the CD.  Only 1 image of the right coronary artery could be visualized.    Ischemic cardiomyopathy.  Patient has history of  ischemic cardiomyopathy with ejection fraction of 25% in the past.  Echocardiogram 3/13/2023 revealed severe left ventricular dysfunction with ejection fraction of 15%.    Abstinence from smoking     Medications were reviewed and updated.  Patient is on aspirin 81 mg a day Plavix 75 mg a day Zetia lisinopril 5 mg a day metoprolol XL 25 mg a day.     Stay off work till released.  (Date of 5/18/2023 was added to his disability papers.  Insurance company would not accept dev particular date.)    I had a lengthy discussion with patient and patient's wife at bedside.  The options at this time is to repeat coronary arteriography and assess feasibility of intervention CABG etc.  If patient cannot be revascularized either by intervention or CABG (please note patient has severe left ventricular dysfunction) patient had to be continued Minrath GDMT therapy and consider LifeVest/ICD (single-chamber versus biventricular ICD).  Patient has left bundle branch block.  Patient and patient's wife were made aware of increased risk of dysrhythmia with left ventricular dysfunction and sudden death.    Patient and patient's wife would like to contact hospital in [facility] and see if they can send more information regarding previous cardiac catheterization.  Tentatively cardiac cath (right and left heart cath) was scheduled for next Friday.     Further plan will depend on patient's Progress.  ///////////         Diagnosis Plan   1. Status post angioplasty with stent        2. LBBB (left bundle branch block)        3. Essential hypertension        4. Ischemic cardiomyopathy        5. Dyslipidemia        LAB RESULTS (LAST 7 DAYS)    CBC        BMP        CMP         BNP        TROPONIN        CoAg        Creatinine Clearance  CrCl cannot be calculated (Patient's most recent lab result is older than the maximum 30 days allowed.).    ABG        Radiology  No radiology results for the last day                The following portions of the  patient's history were reviewed and updated as appropriate: allergies, current medications, past family history, past medical history, past social history, past surgical history and problem list.    Review of Systems   Constitutional: Negative for malaise/fatigue.   Cardiovascular: Negative for chest pain, dyspnea on exertion, leg swelling and palpitations.   Respiratory: Negative for cough and shortness of breath.    Gastrointestinal: Negative for abdominal pain, nausea and vomiting.   Neurological: Negative for dizziness, focal weakness, headaches, light-headedness and numbness.   All other systems reviewed and are negative.        Current Outpatient Medications:   •  aspirin 81 MG EC tablet, Take 1 tablet by mouth Daily., Disp: , Rfl:   •  atorvastatin (LIPITOR) 80 MG tablet, Take 1 tablet by mouth once daily for 90 days, Disp: 90 tablet, Rfl: 0  •  clopidogrel (PLAVIX) 75 MG tablet, Take 1 tablet by mouth once daily for 90 days, Disp: 90 tablet, Rfl: 0  •  ezetimibe (ZETIA) 10 MG tablet, Take 1 tablet by mouth once daily, Disp: 90 tablet, Rfl: 0  •  lisinopril (PRINIVIL,ZESTRIL) 5 MG tablet, Take 1 tablet by mouth once daily for 90 days, Disp: 90 tablet, Rfl: 0  •  metoprolol succinate XL (TOPROL-XL) 50 MG 24 hr tablet, Take 1 tablet by mouth once daily for 90 days, Disp: 90 tablet, Rfl: 0  •  nitroglycerin (NITROSTAT) 0.4 MG SL tablet, NITROSTAT 0.4 MG SUBL, Disp: , Rfl:   •  Varenicline Tartrate 0.5 MG X 11 & 1 MG X 42 tablet, Take 0.5 mg one daily on days 1-2 and 0.5 mg twice daily on days 4-7. Then 1 mg bid, Disp: 53 tablet, Rfl: 0  •  VITAMIN D PO, Take  by mouth., Disp: , Rfl:     No Known Allergies    Family History   Problem Relation Age of Onset   • Diabetes Father    • Heart failure Father        Past Surgical History:   Procedure Laterality Date   • CARDIAC CATHETERIZATION  05/30/2015   • COLONOSCOPY     • CORONARY ANGIOPLASTY WITH STENT PLACEMENT  05/30/2015    stent lad and circumflex distal to the  marginal branch   • CORONARY ANGIOPLASTY WITH STENT PLACEMENT  06/2013    stent to lad       Past Medical History:   Diagnosis Date   • BBB (bundle branch block)     lt bundle branch block   • CAD (coronary artery disease)    • Cardiomyopathy (HCC)    • CHD (coronary heart disease)    • Congenital heart disease    • Heart murmur    • Hyperlipidemia    • Hypertension    • Myocardial infarction (HCC)     acute   • Sleep apnea        Family History   Problem Relation Age of Onset   • Diabetes Father    • Heart failure Father        Social History     Socioeconomic History   • Marital status:    Tobacco Use   • Smoking status: Some Days     Packs/day: 0.50     Types: Cigarettes     Passive exposure: Current   • Smokeless tobacco: Never   Vaping Use   • Vaping Use: Never used   Substance and Sexual Activity   • Alcohol use: Not Currently   • Drug use: No   • Sexual activity: Yes     Partners: Female         Procedures      Objective:       Physical Exam    There were no vitals taken for this visit.  The patient is alert, oriented and in no distress.    Vital signs as noted above.    Head and neck revealed no carotid bruits or jugular venous distension.  No thyromegaly or lymphadenopathy is present.    Lungs clear.  No wheezing.  Breath sounds are normal bilaterally.    Heart normal first and second heart sounds.  No murmur..  No pericardial rub is present.  No gallop is present.    Abdomen soft and nontender.  No organomegaly is present.    Extremities revealed good peripheral pulses without any pedal edema.    Skin warm and dry.    Musculoskeletal system is grossly normal.    CNS grossly normal.    Reviewed and updated.

## 2023-03-13 ENCOUNTER — HOSPITAL ENCOUNTER (OUTPATIENT)
Dept: CARDIOLOGY | Facility: HOSPITAL | Age: 63
Discharge: HOME | End: 2023-03-13
Admitting: INTERNAL MEDICINE
Payer: COMMERCIAL

## 2023-03-13 DIAGNOSIS — E78.5 DYSLIPIDEMIA: ICD-10-CM

## 2023-03-13 DIAGNOSIS — I25.5 ISCHEMIC CARDIOMYOPATHY: ICD-10-CM

## 2023-03-13 DIAGNOSIS — I44.7 LBBB (LEFT BUNDLE BRANCH BLOCK): ICD-10-CM

## 2023-03-13 DIAGNOSIS — Z95.820 STATUS POST ANGIOPLASTY WITH STENT: ICD-10-CM

## 2023-03-13 DIAGNOSIS — I10 ESSENTIAL HYPERTENSION: ICD-10-CM

## 2023-03-13 LAB
BH CV ECHO MEAS - ACS: 2.08 CM
BH CV ECHO MEAS - AO MAX PG: 5.4 MMHG
BH CV ECHO MEAS - AO MEAN PG: 2.9 MMHG
BH CV ECHO MEAS - AO ROOT DIAM: 3.4 CM
BH CV ECHO MEAS - AO V2 MAX: 116.3 CM/SEC
BH CV ECHO MEAS - AO V2 VTI: 21.1 CM
BH CV ECHO MEAS - AVA(I,D): 1.34 CM2
BH CV ECHO MEAS - EDV(CUBED): 219.1 ML
BH CV ECHO MEAS - EDV(MOD-SP4): 179 ML
BH CV ECHO MEAS - EF(MOD-BP): 29 %
BH CV ECHO MEAS - EF(MOD-SP4): 28.9 %
BH CV ECHO MEAS - ESV(CUBED): 110 ML
BH CV ECHO MEAS - ESV(MOD-SP4): 127.2 ML
BH CV ECHO MEAS - FS: 20.5 %
BH CV ECHO MEAS - IVS/LVPW: 0.76 CM
BH CV ECHO MEAS - IVSD: 0.76 CM
BH CV ECHO MEAS - LA DIMENSION: 4.8 CM
BH CV ECHO MEAS - LV DIASTOLIC VOL/BSA (35-75): 90.8 CM2
BH CV ECHO MEAS - LV MASS(C)D: 212.7 GRAMS
BH CV ECHO MEAS - LV MAX PG: 1.2 MMHG
BH CV ECHO MEAS - LV MEAN PG: 0.62 MMHG
BH CV ECHO MEAS - LV SYSTOLIC VOL/BSA (12-30): 64.5 CM2
BH CV ECHO MEAS - LV V1 MAX: 54.7 CM/SEC
BH CV ECHO MEAS - LV V1 VTI: 7.9 CM
BH CV ECHO MEAS - LVIDD: 6 CM
BH CV ECHO MEAS - LVIDS: 4.8 CM
BH CV ECHO MEAS - LVOT AREA: 3.6 CM2
BH CV ECHO MEAS - LVOT DIAM: 2.13 CM
BH CV ECHO MEAS - LVPWD: 1.01 CM
BH CV ECHO MEAS - MV A MAX VEL: 92.1 CM/SEC
BH CV ECHO MEAS - MV DEC SLOPE: 339.5 CM/SEC2
BH CV ECHO MEAS - MV DEC TIME: 0.17 MSEC
BH CV ECHO MEAS - MV E MAX VEL: 56.3 CM/SEC
BH CV ECHO MEAS - MV E/A: 0.61
BH CV ECHO MEAS - MV MAX PG: 3.7 MMHG
BH CV ECHO MEAS - MV MEAN PG: 1.15 MMHG
BH CV ECHO MEAS - MV V2 VTI: 17.9 CM
BH CV ECHO MEAS - MVA(VTI): 1.58 CM2
BH CV ECHO MEAS - PA ACC TIME: 0.09 SEC
BH CV ECHO MEAS - PA PR(ACCEL): 37 MMHG
BH CV ECHO MEAS - PULM A REVS DUR: 0.13 SEC
BH CV ECHO MEAS - PULM A REVS VEL: 31.7 CM/SEC
BH CV ECHO MEAS - PULM DIAS VEL: 27.8 CM/SEC
BH CV ECHO MEAS - PULM S/D: 1.64
BH CV ECHO MEAS - PULM SYS VEL: 45.7 CM/SEC
BH CV ECHO MEAS - RAP SYSTOLE: 3 MMHG
BH CV ECHO MEAS - RV MAX PG: 3 MMHG
BH CV ECHO MEAS - RV V1 MAX: 87.2 CM/SEC
BH CV ECHO MEAS - RV V1 VTI: 18.3 CM
BH CV ECHO MEAS - RVDD: 2.5 CM
BH CV ECHO MEAS - SI(MOD-SP4): 26.2 ML/M2
BH CV ECHO MEAS - SV(LVOT): 28.2 ML
BH CV ECHO MEAS - SV(MOD-SP4): 51.7 ML
MAXIMAL PREDICTED HEART RATE: 156 BPM
STRESS TARGET HR: 133 BPM

## 2023-03-13 PROCEDURE — 93306 TTE W/DOPPLER COMPLETE: CPT | Performed by: INTERNAL MEDICINE

## 2023-03-13 PROCEDURE — 93306 TTE W/DOPPLER COMPLETE: CPT

## 2023-03-14 ENCOUNTER — OFFICE VISIT (OUTPATIENT)
Dept: CARDIOLOGY | Facility: CLINIC | Age: 63
End: 2023-03-14
Payer: COMMERCIAL

## 2023-03-14 VITALS
HEIGHT: 70 IN | DIASTOLIC BLOOD PRESSURE: 62 MMHG | HEART RATE: 70 BPM | BODY MASS INDEX: 25.05 KG/M2 | SYSTOLIC BLOOD PRESSURE: 97 MMHG | WEIGHT: 175 LBS | OXYGEN SATURATION: 100 %

## 2023-03-14 DIAGNOSIS — I44.7 LBBB (LEFT BUNDLE BRANCH BLOCK): ICD-10-CM

## 2023-03-14 DIAGNOSIS — I10 ESSENTIAL HYPERTENSION: ICD-10-CM

## 2023-03-14 DIAGNOSIS — I25.5 ISCHEMIC CARDIOMYOPATHY: ICD-10-CM

## 2023-03-14 DIAGNOSIS — Z95.820 STATUS POST ANGIOPLASTY WITH STENT: Primary | ICD-10-CM

## 2023-03-14 DIAGNOSIS — E78.5 DYSLIPIDEMIA: ICD-10-CM

## 2023-03-14 PROCEDURE — 99214 OFFICE O/P EST MOD 30 MIN: CPT | Performed by: INTERNAL MEDICINE

## 2023-03-16 ENCOUNTER — TELEPHONE (OUTPATIENT)
Dept: CARDIOLOGY | Facility: CLINIC | Age: 63
End: 2023-03-16
Payer: COMMERCIAL

## 2023-03-16 NOTE — TELEPHONE ENCOUNTER
Papers signed.   Called and advised patient of $25 fee - can fax once paid for.   Pt understood, coming into office today.   Original papers up front, kept copy to fax and scan.

## 2023-03-20 ENCOUNTER — TELEPHONE (OUTPATIENT)
Dept: CARDIOLOGY | Facility: CLINIC | Age: 63
End: 2023-03-20
Payer: COMMERCIAL

## 2023-03-20 NOTE — TELEPHONE ENCOUNTER
Pt stopped by to see if we had the disc from the hospital Dr. Baumann was going to get. Pt was given the disc and I advsd per  disc could be read at the hospital. Pt stated he has an appt to discussed tomorrow. He also emailed me a copy of his angiogram. Gave to Dr. Ibis [id number] for chart tomorrow.

## 2023-03-21 ENCOUNTER — OFFICE VISIT (OUTPATIENT)
Dept: CARDIOLOGY | Facility: CLINIC | Age: 63
End: 2023-03-21
Payer: COMMERCIAL

## 2023-03-21 VITALS
HEIGHT: 70 IN | DIASTOLIC BLOOD PRESSURE: 67 MMHG | OXYGEN SATURATION: 99 % | HEART RATE: 62 BPM | SYSTOLIC BLOOD PRESSURE: 108 MMHG | BODY MASS INDEX: 25.05 KG/M2 | WEIGHT: 175 LBS

## 2023-03-21 DIAGNOSIS — Z95.820 STATUS POST ANGIOPLASTY WITH STENT: Primary | ICD-10-CM

## 2023-03-21 DIAGNOSIS — I44.7 LBBB (LEFT BUNDLE BRANCH BLOCK): ICD-10-CM

## 2023-03-21 DIAGNOSIS — I10 ESSENTIAL HYPERTENSION: ICD-10-CM

## 2023-03-21 DIAGNOSIS — E78.5 DYSLIPIDEMIA: ICD-10-CM

## 2023-03-21 DIAGNOSIS — I25.5 ISCHEMIC CARDIOMYOPATHY: ICD-10-CM

## 2023-03-21 PROCEDURE — 99214 OFFICE O/P EST MOD 30 MIN: CPT | Performed by: INTERNAL MEDICINE

## 2023-03-21 NOTE — PROGRESS NOTES
Encounter Date:03/21/2023    Last seen 3/14/2023      Patient ID: Zia Lott is a 62 y.o. male.    Chief Complaint:    Status post stent  Left bundle branch block  Recent myocardial infarction     History of Present Illness  Patient recently was seen with following history.  Reviewed and updated.  Patient is having mild shortness of breath with activity.     Patient recently was in the [facility].  After staying for 1 week patient was supposed to return at that morning developed severe chest pain associated with shortness of breath and profuse sweating.  Patient was seen in the clinic locally and subsequently was flown to [facility].  He had cardiac catheterization.  Details of cardiac catheter not available.  They are concerned about intervening and suggested further follow-up in [place].     Since he was released from the hospital the patient has been without any chest discomfort ,shortness of breath, palpitations, dizziness or syncope.  Denies having any headache ,abdominal pain ,nausea, vomiting , diarrhea constipation, loss of weight or loss of appetite.  Denies having any excessive bruising ,hematuria or blood in the stool.     Review of all systems negative except as indicated.     Reviewed ROS.    Assessment and Plan         ///////////////////////  Impression  ==========  - Recent myocardial infarction in [facility] 2/18/2023  Cardiac cath 2/20/2023 (details not available)  Patient apparently was told he has complex disease and not comfortable in performing intervention in the [facility].      - status post stent to LAD 05/30/2015 and 06/20/2013.     Status post subendocardial myocardial infarction 05/30/2015 prior to stent placement.     - Ischemic cardiomyopathy.-Ejection fraction 15%.-3/13/2023     Echocardiogram 3/13/2023 revealed  Structurally and functionally normal cardiac valves.  Significant left ventricle enlargement with severe and diffuse hypocontractility with ejection fraction of  15%.     Cardiac catheterization 2015 revealed -  Previously placed LAD stent was patent.  Distal LAD has 50% disease.  Chronic and total occlusion of 1st marginal branch.  Distal marginal branch was filling from RCA.  Circumflex coronary artery has 90-95%  distal to the origin of marginal branch.  RCA is a large and dominant vessel that has diffuse 50% disease.     Echocardiogram 09/11/2018 revealed severe left ventricular dysfunction with ejection fraction of 25%.  Moderate mitral and tricuspid regurgitation is present.     Stress Cardiolite test 09/11/2018 revealed significant anterior apical inferior and poster lateral infarction and ischemia.       -status post anterior wall myocardial infarction 06/2013 and stent placement to LAD 06/2013.       -ischemic cardiomyopathy.  Recent left ventricular ejection fraction was 25-30%.       -smoker -patient was advised to abstain from smoking.        -chronic left bundle-branch block      -hypertension and dyslipidemia      -history of drinking alcohol     -no known allergies  ===========   Plan  =============  Status post myocardial infarction 2/18/2023 and cardiac cath 2/20/2023  Patient recently was in the [facility].  After staying for 1 week patient was supposed to return at that morning developed severe chest pain associated with shortness of breath and profuse sweating.  Patient was seen in the clinic locally and subsequently was flown to [facility].  He had cardiac catheterization.  Details of cardiac catheter not available.  They are concerned about intervening and suggested further follow-up in .     Patient is not having symptoms at this time except for mild shortness of breath..     Patient now not having angina pectoris or congestive heart failure.     Patient brought CD.  Have made attempts by me and other staff in cardiac Cath Lab to review the CD.  Only 1 image of the right coronary artery could be visualized.     Ischemic cardiomyopathy.  Patient has  history of ischemic cardiomyopathy with ejection fraction of 25% in the past.  Echocardiogram 3/13/2023 revealed severe left ventricular dysfunction with ejection fraction of 15%.     Abstinence from smoking     Medications were reviewed and updated.  Patient is on aspirin 81 mg a day Plavix 75 mg a day Zetia lisinopril 5 mg a day metoprolol XL 25 mg a day.     Stay off work till released.  (Date of 5/18/2023 was added to his disability papers.  Insurance company would not accept dev particular date.)     I had a lengthy discussion with patient and patient's wife at bedside.  The options at this time is to repeat coronary arteriography and assess feasibility of intervention CABG etc.  If patient cannot be revascularized either by intervention or CABG (please note patient has severe left ventricular dysfunction) patient had to be continued Minrath GDMT therapy and consider LifeVest/ICD (single-chamber versus biventricular ICD).  Patient has left bundle branch block.  Patient and patient's wife were made aware of increased risk of dysrhythmia with left ventricular dysfunction and sudden death.     Patient and patient's wife would like to contact hospital in [facility] and see if they can send more information regarding previous cardiac catheterization.  Tentatively cardiac cath (right and left heart cath) was scheduled for next Friday.     Further plan will depend on patient's Progress.  ///////////  3/21/2023  Cardiac cath CT was given to patient.  They were able to get some pictures on the computer which I have reviewed in detail.  Only limited views available.  Difficult to assess the severity of the disease on the computer images.  However patient has severe left ventricular dysfunction and somewhat diffuse coronary artery disease and vessels are small in caliber.  I have discussed with them about having cardiovascular surgery opinion although he may not be a surgical candidate.  Spoke with Bisi and she will try  to arrange the appointment.  I can have interventionist also review the films although the images are somewhat suboptimal for accurate interpretation of coronary artery disease.    Patient has significant left ventricle dysfunction.  Suggested having LifeVest placement and he wants to think about it.  Have discussed with him and his wife regarding ICD (single-chamber versus biventricular ICD although patient does not have any congestive heart failure symptoms at this time.  I have explained to them about arrhythmic risk and sudden death without protection or LifeVest or ICD.    Follow-up in the office in 4 weeks.  ]]]]]]]]]]]]]]]]]]]]]]]]]]]]]             Diagnosis Plan   1. Status post angioplasty with stent        2. Ischemic cardiomyopathy        3. LBBB (left bundle branch block)        4. Dyslipidemia        5. Essential hypertension        LAB RESULTS (LAST 7 DAYS)    CBC        BMP        CMP         BNP        TROPONIN        CoAg        Creatinine Clearance  CrCl cannot be calculated (Patient's most recent lab result is older than the maximum 30 days allowed.).    ABG        Radiology  No radiology results for the last day                The following portions of the patient's history were reviewed and updated as appropriate: allergies, current medications, past family history, past medical history, past social history, past surgical history and problem list.    Review of Systems   Constitutional: Negative for malaise/fatigue.   Cardiovascular: Negative for chest pain, dyspnea on exertion, leg swelling and palpitations.   Respiratory: Negative for cough and shortness of breath.    Gastrointestinal: Negative for abdominal pain, nausea and vomiting.   Neurological: Negative for dizziness, focal weakness, headaches, light-headedness and numbness.   All other systems reviewed and are negative.        Current Outpatient Medications:   •  aspirin 81 MG EC tablet, Take 1 tablet by mouth Daily., Disp: , Rfl:   •   atorvastatin (LIPITOR) 80 MG tablet, Take 1 tablet by mouth once daily for 90 days, Disp: 90 tablet, Rfl: 0  •  clopidogrel (PLAVIX) 75 MG tablet, Take 1 tablet by mouth once daily for 90 days, Disp: 90 tablet, Rfl: 0  •  ezetimibe (ZETIA) 10 MG tablet, Take 1 tablet by mouth once daily, Disp: 90 tablet, Rfl: 0  •  lisinopril (PRINIVIL,ZESTRIL) 5 MG tablet, Take 1 tablet by mouth once daily for 90 days, Disp: 90 tablet, Rfl: 0  •  metoprolol succinate XL (TOPROL-XL) 50 MG 24 hr tablet, Take 1 tablet by mouth once daily for 90 days, Disp: 90 tablet, Rfl: 0  •  nitroglycerin (NITROSTAT) 0.4 MG SL tablet, NITROSTAT 0.4 MG SUBL, Disp: , Rfl:   •  Varenicline Tartrate 0.5 MG X 11 & 1 MG X 42 tablet, Take 0.5 mg one daily on days 1-2 and 0.5 mg twice daily on days 4-7. Then 1 mg bid, Disp: 53 tablet, Rfl: 0  •  VITAMIN D PO, Take  by mouth., Disp: , Rfl:     No Known Allergies    Family History   Problem Relation Age of Onset   • Diabetes Father    • Heart failure Father        Past Surgical History:   Procedure Laterality Date   • CARDIAC CATHETERIZATION  05/30/2015   • COLONOSCOPY     • CORONARY ANGIOPLASTY WITH STENT PLACEMENT  05/30/2015    stent lad and circumflex distal to the marginal branch   • CORONARY ANGIOPLASTY WITH STENT PLACEMENT  06/2013    stent to lad       Past Medical History:   Diagnosis Date   • BBB (bundle branch block)     lt bundle branch block   • CAD (coronary artery disease)    • Cardiomyopathy (HCC)    • CHD (coronary heart disease)    • Congenital heart disease    • Heart murmur    • Hyperlipidemia    • Hypertension    • Myocardial infarction (HCC)     acute   • Sleep apnea        Family History   Problem Relation Age of Onset   • Diabetes Father    • Heart failure Father        Social History     Socioeconomic History   • Marital status:    Tobacco Use   • Smoking status: Some Days     Packs/day: 0.50     Types: Cigarettes     Passive exposure: Current   • Smokeless tobacco: Never  "  Vaping Use   • Vaping Use: Never used   Substance and Sexual Activity   • Alcohol use: Not Currently   • Drug use: No   • Sexual activity: Yes     Partners: Female         Procedures      Objective:       Physical Exam    /67 (BP Location: Right arm, Patient Position: Sitting, Cuff Size: Adult)   Pulse 62   Ht 177.8 cm (70\")   Wt 79.4 kg (175 lb)   SpO2 99%   BMI 25.11 kg/m²   The patient is alert, oriented and in no distress.    Vital signs as noted above.    Head and neck revealed no carotid bruits or jugular venous distension.  No thyromegaly or lymphadenopathy is present.    Lungs clear.  No wheezing.  Breath sounds are normal bilaterally.    Heart normal first and second heart sounds.  No murmur..  No pericardial rub is present.  No gallop is present.    Abdomen soft and nontender.  No organomegaly is present.    Extremities revealed good peripheral pulses without any pedal edema.    Skin warm and dry.    Musculoskeletal system is grossly normal.    CNS grossly normal.    Reviewed and updated.        "

## 2023-03-30 ENCOUNTER — OFFICE VISIT (OUTPATIENT)
Dept: CARDIAC SURGERY | Facility: CLINIC | Age: 63
End: 2023-03-30
Payer: COMMERCIAL

## 2023-03-30 VITALS
SYSTOLIC BLOOD PRESSURE: 129 MMHG | HEIGHT: 70 IN | BODY MASS INDEX: 25.31 KG/M2 | RESPIRATION RATE: 20 BRPM | TEMPERATURE: 97.5 F | OXYGEN SATURATION: 95 % | WEIGHT: 176.8 LBS | DIASTOLIC BLOOD PRESSURE: 79 MMHG | HEART RATE: 73 BPM

## 2023-03-30 DIAGNOSIS — I10 PRIMARY HYPERTENSION: ICD-10-CM

## 2023-03-30 DIAGNOSIS — I21.02 ST ELEVATION MYOCARDIAL INFARCTION INVOLVING LEFT ANTERIOR DESCENDING (LAD) CORONARY ARTERY: ICD-10-CM

## 2023-03-30 DIAGNOSIS — I25.10 CHRONIC CORONARY ARTERY DISEASE: ICD-10-CM

## 2023-03-30 DIAGNOSIS — Z95.820 STATUS POST ANGIOPLASTY WITH STENT: ICD-10-CM

## 2023-03-30 DIAGNOSIS — I44.7 LBBB (LEFT BUNDLE BRANCH BLOCK): ICD-10-CM

## 2023-03-30 DIAGNOSIS — F41.1 GAD (GENERALIZED ANXIETY DISORDER): ICD-10-CM

## 2023-03-30 DIAGNOSIS — I25.5 CARDIOMYOPATHY, ISCHEMIC: Primary | ICD-10-CM

## 2023-03-30 PROCEDURE — 99204 OFFICE O/P NEW MOD 45 MIN: CPT | Performed by: THORACIC SURGERY (CARDIOTHORACIC VASCULAR SURGERY)

## 2023-03-31 ENCOUNTER — TELEPHONE (OUTPATIENT)
Dept: CARDIOLOGY | Facility: CLINIC | Age: 63
End: 2023-03-31
Payer: COMMERCIAL

## 2023-03-31 NOTE — TELEPHONE ENCOUNTER
Patient needed updated papers for employer.   Papers are filled out and on Dr. Baumann's desk for signature.

## 2023-03-31 NOTE — PROGRESS NOTES
3/31/2023    Seen on 3/30/2023    Chief Complaint     Evaluation of coronary artery disease, status post MI    History of Present Illness:       Dear Sergio and Colleagues,  It was nice to see Zia LUKE Kaylie in consultation at your request. He is a 62 y.o. male with hypertension, tobacco abuse, hyperlipidemia, history of STEMI, history of PCI of the LAD and cardiomyopathy who developed an acute MI recently during a trip in the [facility].  It is unclear to me whether it was an ST elevation MI or not.  He was flown to the [facility] and at [facility] he underwent a cardiac cath that showed significant ostial LAD and circumflex as well as a 50% before stent RCA stenosis.  His initial symptoms were chest pain and diaphoresis.  The symptoms resolved with treatment. He denies new symptoms in the interval.  He had a 2D echocardiogram performed that showed an ejection fraction of 15 to 20% and a dilated left ventricle with very poor contractility and no mitral regurgitation.  He is here for a surgical evaluation    Patient Active Problem List   Diagnosis   • Cardiomyopathy, ischemic   • Chronic coronary artery disease   • Cigarette smoker   • Hyperlipidemia   • Hypertension   • Status post percutaneous transluminal coronary angioplasty   • ST elevation (STEMI) myocardial infarction (HCC)   • Stress at work   • DORA (generalized anxiety disorder)   • Special screening for malignant neoplasm of prostate   • Status post angioplasty with stent   • LBBB (left bundle branch block)       Past Medical History:   Diagnosis Date   • BBB (bundle branch block)     lt bundle branch block   • CAD (coronary artery disease)    • Cardiomyopathy (HCC)    • CHD (coronary heart disease)    • Congenital heart disease    • Heart murmur    • Hyperlipidemia    • Hypertension    • Myocardial infarction (HCC)     acute   • Sleep apnea        Past Surgical History:   Procedure Laterality Date   • CARDIAC CATHETERIZATION  05/30/2015   •  COLONOSCOPY     • CORONARY ANGIOPLASTY WITH STENT PLACEMENT  2015    stent lad and circumflex distal to the marginal branch   • CORONARY ANGIOPLASTY WITH STENT PLACEMENT  2013    stent to lad       No Known Allergies      Current Outpatient Medications:   •  aspirin 81 MG EC tablet, Take 1 tablet by mouth Daily., Disp: , Rfl:   •  atorvastatin (LIPITOR) 80 MG tablet, Take 1 tablet by mouth once daily for 90 days, Disp: 90 tablet, Rfl: 0  •  clopidogrel (PLAVIX) 75 MG tablet, Take 1 tablet by mouth once daily for 90 days, Disp: 90 tablet, Rfl: 0  •  ezetimibe (ZETIA) 10 MG tablet, Take 1 tablet by mouth once daily, Disp: 90 tablet, Rfl: 0  •  lisinopril (PRINIVIL,ZESTRIL) 5 MG tablet, Take 1 tablet by mouth once daily for 90 days, Disp: 90 tablet, Rfl: 0  •  metoprolol succinate XL (TOPROL-XL) 50 MG 24 hr tablet, Take 1 tablet by mouth once daily for 90 days, Disp: 90 tablet, Rfl: 0  •  VITAMIN D PO, Take  by mouth., Disp: , Rfl:   •  nitroglycerin (NITROSTAT) 0.4 MG SL tablet, NITROSTAT 0.4 MG SUBL, Disp: , Rfl:     Social History     Socioeconomic History   • Marital status:    Tobacco Use   • Smoking status: Former     Packs/day: 0.50     Types: Cigarettes     Quit date: 2023     Years since quittin.1     Passive exposure: Current   • Smokeless tobacco: Never   Vaping Use   • Vaping Use: Never used   Substance and Sexual Activity   • Alcohol use: Not Currently   • Drug use: No   • Sexual activity: Yes     Partners: Female       Family History   Problem Relation Age of Onset   • Diabetes Father    • Heart failure Father      Review of Systems   Constitutional: Negative for diaphoresis.   Respiratory: Positive for shortness of breath.    Cardiovascular: Positive for chest pain. Negative for palpitations and leg swelling.   Neurological: Negative for weakness.   All other systems reviewed and are negative.      Physical Exam:    Vital Signs:  Weight: 80.2 kg (176 lb 12.8 oz)   Body mass  index is 25.37 kg/m².  Temp: 97.5 °F (36.4 °C)   Heart Rate: 73   BP: 129/79     Constitutional:       Appearance: Well-developed.   Eyes:      Conjunctiva/sclera: Conjunctivae normal.      Pupils: Pupils are equal, round, and reactive to light.   HENT:      Head: Normocephalic and atraumatic.      Nose: Nose normal.   Neck:      Thyroid: No thyromegaly.      Vascular: No JVD.      Lymphadenopathy: No cervical adenopathy.   Pulmonary:      Effort: Pulmonary effort is normal.      Breath sounds: Normal breath sounds. No rales.   Cardiovascular:      Normal rate. Regular rhythm.      Murmurs: There is no murmur.      No gallop.   Pulses:     Intact distal pulses. No decreased pulses.   Edema:     Peripheral edema absent.   Abdominal:      General: Bowel sounds are normal. There is no distension.      Palpations: Abdomen is soft. There is no abdominal mass.      Tenderness: There is no abdominal tenderness.   Musculoskeletal: Normal range of motion.         General: No tenderness or deformity.      Cervical back: Normal range of motion and neck supple. Skin:     General: Skin is warm and dry.      Findings: No erythema or rash.   Neurological:      Mental Status: Alert and oriented to person, place, and time.      Deep Tendon Reflexes: Reflexes are normal and symmetric.   Psychiatric:         Behavior: Behavior normal.          Assessment:     Problems Addressed this Visit        Cardiac and Vasculature    Cardiomyopathy, ischemic - Primary    Chronic coronary artery disease    Hypertension    ST elevation (STEMI) myocardial infarction (HCC)    Status post angioplasty with stent    LBBB (left bundle branch block)       Mental Health    DORA (generalized anxiety disorder)   Diagnoses       Codes Comments    Cardiomyopathy, ischemic    -  Primary ICD-10-CM: I25.5  ICD-9-CM: 414.8     Chronic coronary artery disease     ICD-10-CM: I25.10  ICD-9-CM: 414.00     Primary hypertension     ICD-10-CM: I10  ICD-9-CM: 401.9     ST  elevation myocardial infarction involving left anterior descending (LAD) coronary artery (HCC)     ICD-10-CM: I21.02  ICD-9-CM: 410.10     Status post angioplasty with stent     ICD-10-CM: Z95.820  ICD-9-CM: V45.89     LBBB (left bundle branch block)     ICD-10-CM: I44.7  ICD-9-CM: 426.3     DORA (generalized anxiety disorder)     ICD-10-CM: F41.1  ICD-9-CM: 300.02         Assessment/recommendation:     1. Significant two-vessel coronary artery disease status post acute MI with significant LV dysfunction.  The patient is very functional however he is not exerting himself.  He denies chest pain at this time.  I recommend another 4 weeks of medical treatment with the hope of LV recruitment and improvement and then discuss a high risk revascularization possibly with Impella 5.5 standby.  I will see him in my office in 1 month and discuss the possible operation  2. Ischemic cardiomyopathy and congestive heart failure.  Continue present treatment.  3. Hypertension, well controlled continue present regimen    Thank you for allowing me to participate in his care.    Regards,    Pedro Monge M.D.    I spent over 45 minutes before, during and after the office visit in reviewing the records, examining the patient, reviewing and interpreting myself the cardiac cath and the echocardiogram, discussing the findings and options with the patient and wife, discussing the natural history of the disease and the guidelines for intervention, discussing the importance of allowing time for LV improvement and discussing the operative treatment.  Also spent time in documenting in the electronic record

## 2023-04-04 ENCOUNTER — TELEPHONE (OUTPATIENT)
Dept: CARDIOLOGY | Facility: CLINIC | Age: 63
End: 2023-04-04
Payer: COMMERCIAL

## 2023-04-04 ENCOUNTER — TELEPHONE (OUTPATIENT)
Dept: CARDIAC SURGERY | Facility: CLINIC | Age: 63
End: 2023-04-04
Payer: COMMERCIAL

## 2023-04-04 NOTE — TELEPHONE ENCOUNTER
Called centralized scheduling to schedule Echo at [facility] around 4/27, offered appt for 5/8/23, requested  call the department to see if patient could be worked in sooner. Per scheduling, no availability for patient to be worked in sooner. Scheduled Echo on 5/8/23 @ 230 pm at [facility], Advised for  to read.      Spoke with LucyKaylie. Advised of Echo appt on 5/8/23@230. He verbalized understanding and this was agreeable.

## 2023-04-04 NOTE — TELEPHONE ENCOUNTER
"  Caller: Zia Lott \"Ric\"    Relationship: Self    Best call back number: [phone]    What is the best time to reach you: ANYTIME    What was the call regarding: PT STATES THEY ARE NEEDING TO SEE IF THEIR ECHO CAN BE RESCHEDULED FOR AN EARLIER DATE. PT IS NEEDING THE ECHO BEFORE HIS FOLLOW-UP APPT.    Do you require a callback: YES            "

## 2023-04-20 ENCOUNTER — OFFICE VISIT (OUTPATIENT)
Dept: CARDIOLOGY | Facility: CLINIC | Age: 63
End: 2023-04-20
Payer: COMMERCIAL

## 2023-04-20 ENCOUNTER — TELEPHONE (OUTPATIENT)
Dept: CARDIAC SURGERY | Facility: CLINIC | Age: 63
End: 2023-04-20
Payer: COMMERCIAL

## 2023-04-20 VITALS
OXYGEN SATURATION: 99 % | DIASTOLIC BLOOD PRESSURE: 66 MMHG | BODY MASS INDEX: 25.62 KG/M2 | HEIGHT: 70 IN | SYSTOLIC BLOOD PRESSURE: 105 MMHG | HEART RATE: 62 BPM | WEIGHT: 179 LBS

## 2023-04-20 DIAGNOSIS — I25.10 CHRONIC CORONARY ARTERY DISEASE: ICD-10-CM

## 2023-04-20 DIAGNOSIS — Z56.6 STRESS AT WORK: ICD-10-CM

## 2023-04-20 DIAGNOSIS — Z95.820 STATUS POST ANGIOPLASTY WITH STENT: Primary | ICD-10-CM

## 2023-04-20 DIAGNOSIS — I25.5 ISCHEMIC CARDIOMYOPATHY: ICD-10-CM

## 2023-04-20 DIAGNOSIS — E78.2 MIXED HYPERLIPIDEMIA: ICD-10-CM

## 2023-04-20 DIAGNOSIS — Z12.5 SPECIAL SCREENING FOR MALIGNANT NEOPLASM OF PROSTATE: ICD-10-CM

## 2023-04-20 DIAGNOSIS — I10 PRIMARY HYPERTENSION: ICD-10-CM

## 2023-04-20 DIAGNOSIS — I10 ESSENTIAL HYPERTENSION: ICD-10-CM

## 2023-04-20 DIAGNOSIS — Z98.61 STATUS POST PERCUTANEOUS TRANSLUMINAL CORONARY ANGIOPLASTY: ICD-10-CM

## 2023-04-20 DIAGNOSIS — I44.7 LBBB (LEFT BUNDLE BRANCH BLOCK): ICD-10-CM

## 2023-04-20 DIAGNOSIS — F17.210 CIGARETTE SMOKER: ICD-10-CM

## 2023-04-20 DIAGNOSIS — E78.5 DYSLIPIDEMIA: ICD-10-CM

## 2023-04-20 SDOH — HEALTH STABILITY - MENTAL HEALTH: OTHER PHYSICAL AND MENTAL STRAIN RELATED TO WORK: Z56.6

## 2023-04-20 NOTE — TELEPHONE ENCOUNTER
"    Caller: Zia Lott \"Ric\"    Relationship to patient: Self    Best call back number: [phone]     Type of visit: FOLLOW UP     Requested date: NEXT AVAILABLE     Additional notes: PATIENT WOULD LIKE A CALL BACK TO DISCUSS WHEN HE NEEDS TO FOLLOW UP WITH DR. ZHU    "

## 2023-04-20 NOTE — PROGRESS NOTES
Encounter Date:04/20/2023  Last seen 3/21/2023      Patient ID: Zia Lott is a 62 y.o. male.    Chief Complaint:    Status post stent  Left bundle branch block  Recent myocardial infarction     History of Present Illness  Patient seen Dr. Monge since last visit.  Delete that    Since I have last seen, the patient has been without any chest discomfort ,shortness of breath, palpitations, dizziness or syncope.  Denies having any headache ,abdominal pain ,nausea, vomiting , diarrhea constipation, loss of weight or loss of appetite.  Denies having any excessive bruising ,hematuria or blood in the stool.    Review of all systems negative except as indicated.    Reviewed ROS.  Assessment and Plan         ///////////////////////  Impression  ==========  - Recent myocardial infarction in [facility] 2/18/2023  Cardiac cath 2/20/2023 (details not available)  Patient apparently was told he has complex disease and not comfortable in performing intervention in the [facility].      - status post stent to LAD 05/30/2015 and 06/20/2013.     Status post subendocardial myocardial infarction 05/30/2015 prior to stent placement.     - Ischemic cardiomyopathy.-Ejection fraction 15%.-3/13/2023     Echocardiogram 3/13/2023 revealed  Structurally and functionally normal cardiac valves.  Significant left ventricle enlargement with severe and diffuse hypocontractility with ejection fraction of 15%.     Cardiac catheterization 2015 revealed -  Previously placed LAD stent was patent.  Distal LAD has 50% disease.  Chronic and total occlusion of 1st marginal branch.  Distal marginal branch was filling from RCA.  Circumflex coronary artery has 90-95%  distal to the origin of marginal branch.  RCA is a large and dominant vessel that has diffuse 50% disease.     Echocardiogram 09/11/2018 revealed severe left ventricular dysfunction with ejection fraction of 25%.  Moderate mitral and tricuspid regurgitation is present.     Stress Cardiolite test  09/11/2018 revealed significant anterior apical inferior and poster lateral infarction and ischemia.       -status post anterior wall myocardial infarction 06/2013 and stent placement to LAD 06/2013.       -ischemic cardiomyopathy.  Recent left ventricular ejection fraction was 25-30%.       -smoker -patient was advised to abstain from smoking.        -chronic left bundle-branch block      -hypertension and dyslipidemia      -history of drinking alcohol     -no known allergies  ===========   Plan  =============  Status post myocardial infarction 2/18/2023 and cardiac cath 2/20/2023  Patient recently was in the [facility].  After staying for 1 week patient was supposed to return at that morning developed severe chest pain associated with shortness of breath and profuse sweating.  Patient was seen in the clinic locally and subsequently was flown to [facility].  He had cardiac catheterization.  Details of cardiac catheter not available.  They are concerned about intervening and suggested further follow-up in [place].     Patient is not having symptoms at this time except for mild shortness of breath..     Patient now not having angina pectoris or congestive heart failure.     Patient brought CD.  Have made attempts by me and other staff in cardiac Cath Lab to review the CD.  Only 1 image of the right coronary artery could be visualized.     Ischemic cardiomyopathy.  Patient has history of ischemic cardiomyopathy with ejection fraction of 25% in the past.  Echocardiogram 3/13/2023 revealed severe left ventricular dysfunction with ejection fraction of 15%.     Abstinence from smoking     Medications were reviewed and updated.  Patient is on aspirin 81 mg a day Plavix 75 mg a day Zetia lisinopril 5 mg a day metoprolol XL 25 mg a day.     Stay off work till released.  (Date of 5/18/2023 was added to his disability papers.  Insurance company would not accept dev particular date.)     I had a lengthy discussion with patient and  patient's wife at bedside.  The options at this time is to repeat coronary arteriography and assess feasibility of intervention CABG etc.  If patient cannot be revascularized either by intervention or CABG (please note patient has severe left ventricular dysfunction) patient had to be continued Minrath GDMT therapy and consider LifeVest/ICD (single-chamber versus biventricular ICD).  Patient has left bundle branch block.  Patient and patient's wife were made aware of increased risk of dysrhythmia with left ventricular dysfunction and sudden death.     Patient and patient's wife would like to contact \[facility]\"" in [facility] and see if they can send more information regarding previous cardiac catheterization.  Tentatively cardiac cath (right and left heart cath) was scheduled for next Friday.     Further plan will depend on patient's Progress.  ///////////  3/21/2023  Cardiac cath CT was given to patient.  They were able to get some pictures on the computer which I have reviewed in detail.  Only limited views available.  Difficult to assess the severity of the disease on the computer images.  However patient has severe left ventricular dysfunction and somewhat diffuse coronary artery disease and vessels are small in caliber.  I have discussed with them about having cardiovascular surgery opinion although he may not be a surgical candidate.  Spoke with Bisi and she will try to arrange the appointment.  I can have interventionist also review the films although the images are somewhat suboptimal for accurate interpretation of coronary artery disease.     Patient has significant left ventricle dysfunction.  Suggested having LifeVest placement and he wants to think about it.  Have discussed with him and his wife regarding ICD (single-chamber versus biventricular ICD although patient does not have any congestive heart failure symptoms at this time.  I have explained to them about arrhythmic risk and sudden death without  protection or LifeVest or ICD.     Follow-up in the office in 4 weeks.  ]]]]]]]]]]]]]]]]]]]]]]]]]]]]]    4/20/2023  Patient was seen by Dr. Monge since last visit.  The following note taken and reviewed from his note.  1. Significant two-vessel coronary artery disease status post acute MI with significant LV dysfunction.  The patient is very functional however he is not exerting himself.  He denies chest pain at this time.  I recommend another 4 weeks of medical treatment with the hope of LV recruitment and improvement and then discuss a high risk revascularization possibly with Impella 5.5 standby.  I will see him in my office in 1 month and discuss the possible operation  2. Ischemic cardiomyopathy and congestive heart failure.  Continue present treatment.  3. Hypertension, well controlled continue present regimen    Patient is asymptomatic at this time.  Again discussed with patient and patient's wife regarding LifeVest and they did not show any interest in this.  Patient and family understands risk of sudden death with his coronary artery disease and significant left ventricular dysfunction.  Patient will continue present medications.  Patient to have follow-up echocardiogram on 5/8/2023.  The plan at this time is consider surgical revascularization if left ventricular function shows improvement.  However if patient has continued left ventricular dysfunction cardiac catheterization and intervention with probable Impella support will be considered.  Follow-up in the office in about 4 weeks.         Diagnosis Plan   1. Status post angioplasty with stent        2. Ischemic cardiomyopathy        3. LBBB (left bundle branch block)        4. Essential hypertension        5. Dyslipidemia        LAB RESULTS (LAST 7 DAYS)    CBC        BMP        CMP         BNP        TROPONIN        CoAg        Creatinine Clearance  CrCl cannot be calculated (Patient's most recent lab result is older than the maximum 30 days  allowed.).    AB        Radiology  No radiology results for the last day                The following portions of the patient's history were reviewed and updated as appropriate: allergies, current medications, past family history, past medical history, past social history, past surgical history and problem list.    Review of Systems   Constitutional: Negative for malaise/fatigue.   Cardiovascular: Negative for chest pain, dyspnea on exertion, leg swelling and palpitations.   Respiratory: Negative for cough and shortness of breath.    Gastrointestinal: Negative for abdominal pain, nausea and vomiting.   Neurological: Negative for dizziness, focal weakness, headaches, light-headedness and numbness.   All other systems reviewed and are negative.        Current Outpatient Medications:   •  aspirin 81 MG EC tablet, Take 1 tablet by mouth Daily., Disp: , Rfl:   •  atorvastatin (LIPITOR) 80 MG tablet, Take 1 tablet by mouth once daily for 90 days, Disp: 90 tablet, Rfl: 0  •  clopidogrel (PLAVIX) 75 MG tablet, Take 1 tablet by mouth once daily for 90 days, Disp: 90 tablet, Rfl: 0  •  ezetimibe (ZETIA) 10 MG tablet, Take 1 tablet by mouth once daily, Disp: 90 tablet, Rfl: 0  •  lisinopril (PRINIVIL,ZESTRIL) 5 MG tablet, Take 1 tablet by mouth once daily for 90 days, Disp: 90 tablet, Rfl: 0  •  metoprolol succinate XL (TOPROL-XL) 50 MG 24 hr tablet, Take 1 tablet by mouth once daily for 90 days, Disp: 90 tablet, Rfl: 0  •  nitroglycerin (NITROSTAT) 0.4 MG SL tablet, NITROSTAT 0.4 MG SUBL, Disp: , Rfl:   •  VITAMIN D PO, Take  by mouth., Disp: , Rfl:     No Known Allergies    Family History   Problem Relation Age of Onset   • Diabetes Father    • Heart failure Father        Past Surgical History:   Procedure Laterality Date   • CARDIAC CATHETERIZATION  05/30/2015   • COLONOSCOPY     • CORONARY ANGIOPLASTY WITH STENT PLACEMENT  05/30/2015    stent lad and circumflex distal to the marginal branch   • CORONARY ANGIOPLASTY WITH  "STENT PLACEMENT  2013    stent to lad       Past Medical History:   Diagnosis Date   • BBB (bundle branch block)     lt bundle branch block   • CAD (coronary artery disease)    • Cardiomyopathy    • CHD (coronary heart disease)    • Congenital heart disease    • Heart murmur    • Hyperlipidemia    • Hypertension    • Myocardial infarction     acute   • Sleep apnea        Family History   Problem Relation Age of Onset   • Diabetes Father    • Heart failure Father        Social History     Socioeconomic History   • Marital status:    Tobacco Use   • Smoking status: Former     Packs/day: 0.00     Years: 15.00     Pack years: 0.00     Types: Cigarettes     Quit date: 2023     Years since quittin.1     Passive exposure: Current   • Smokeless tobacco: Never   Vaping Use   • Vaping Use: Never used   Substance and Sexual Activity   • Alcohol use: Not Currently   • Drug use: No   • Sexual activity: Yes     Partners: Female         Procedures      Objective:       Physical Exam    /66 (BP Location: Right arm, Patient Position: Sitting, Cuff Size: Adult)   Pulse 62   Ht 177.8 cm (70\")   Wt 81.2 kg (179 lb)   SpO2 99%   BMI 25.68 kg/m²   The patient is alert, oriented and in no distress.    Vital signs as noted above.    Head and neck revealed no carotid bruits or jugular venous distension.  No thyromegaly or lymphadenopathy is present.    Lungs clear.  No wheezing.  Breath sounds are normal bilaterally.    Heart normal first and second heart sounds.  No murmur..  No pericardial rub is present.  No gallop is present.    Abdomen soft and nontender.  No organomegaly is present.    Extremities revealed good peripheral pulses without any pedal edema.    Skin warm and dry.    Musculoskeletal system is grossly normal.    CNS grossly normal.    Reviewed and updated.        "

## 2023-04-21 ENCOUNTER — TELEPHONE (OUTPATIENT)
Dept: CARDIOLOGY | Facility: CLINIC | Age: 63
End: 2023-04-21
Payer: COMMERCIAL

## 2023-04-21 RX ORDER — METOPROLOL SUCCINATE 50 MG/1
TABLET, EXTENDED RELEASE ORAL
Qty: 90 TABLET | Refills: 0 | Status: SHIPPED | OUTPATIENT
Start: 2023-04-21

## 2023-04-21 RX ORDER — LISINOPRIL 5 MG/1
TABLET ORAL
Qty: 90 TABLET | Refills: 0 | Status: SHIPPED | OUTPATIENT
Start: 2023-04-21

## 2023-04-21 RX ORDER — ATORVASTATIN CALCIUM 80 MG/1
TABLET, FILM COATED ORAL
Qty: 90 TABLET | Refills: 0 | Status: SHIPPED | OUTPATIENT
Start: 2023-04-21

## 2023-04-21 RX ORDER — CLOPIDOGREL BISULFATE 75 MG/1
TABLET ORAL
Qty: 90 TABLET | Refills: 0 | Status: SHIPPED | OUTPATIENT
Start: 2023-04-21

## 2023-04-21 NOTE — TELEPHONE ENCOUNTER
Called and left a voicemail for patient to return call about follow up appointment with Dr. Monge.

## 2023-05-07 NOTE — H&P (VIEW-ONLY)
Encounter Date:05/18/2023    Last seen 4/20/2023      Patient ID: Zia Lott is a 62 y.o. male.    Chief Complaint:    Status post stent  Left bundle branch block  Recent myocardial infarction  Ischemic cardiomyopathy     History of Present Illness  Patient seen Dr. Monge since last visit.        Since I have last seen, the patient has been without any chest discomfort ,shortness of breath, palpitations, dizziness or syncope.  Denies having any headache ,abdominal pain ,nausea, vomiting , diarrhea constipation, loss of weight or loss of appetite.  Denies having any excessive bruising ,hematuria or blood in the stool.    Review of all systems negative except as indicated.    Reviewed ROS.    Assessment and Plan         ///////////////////////  Impression  ==========  - Recent myocardial infarction in [facility] 2/18/2023  Cardiac cath 2/20/2023 (details not available)  Patient apparently was told he has complex disease and not comfortable in performing intervention in the [facility].      - status post stent to LAD 05/30/2015 and 06/20/2013.     Status post subendocardial myocardial infarction 05/30/2015 prior to stent placement.     - Ischemic cardiomyopathy.-Ejection fraction 15%.-3/13/2023     Echocardiogram 3/13/2023 revealed  Structurally and functionally normal cardiac valves.  Significant left ventricle enlargement with severe and diffuse hypocontractility with ejection fraction of 15%.     Cardiac catheterization 2015 revealed -  Previously placed LAD stent was patent.  Distal LAD has 50% disease.  Chronic and total occlusion of 1st marginal branch.  Distal marginal branch was filling from RCA.  Circumflex coronary artery has 90-95%  distal to the origin of marginal branch.  RCA is a large and dominant vessel that has diffuse 50% disease.     Echocardiogram 09/11/2018 revealed severe left ventricular dysfunction with ejection fraction of 25%.  Moderate mitral and tricuspid regurgitation is  present.     Stress Cardiolite test 09/11/2018 revealed significant anterior apical inferior and poster lateral infarction and ischemia.       -status post anterior wall myocardial infarction 06/2013 and stent placement to LAD 06/2013.       -ischemic cardiomyopathy.  Recent left ventricular ejection fraction was 25-30%.       -smoker -patient was advised to abstain from smoking.        -chronic left bundle-branch block      -hypertension and dyslipidemia      -history of drinking alcohol     -no known allergies  ===========   Plan  =============  Status post myocardial infarction 2/18/2023 and cardiac cath 2/20/2023  Patient recently was in the [facility].  After staying for 1 week patient was supposed to return at that morning developed severe chest pain associated with shortness of breath and profuse sweating.  Patient was seen in the clinic locally and subsequently was flown to [facility].  He had cardiac catheterization.  Details of cardiac catheter not available.  They are concerned about intervening and suggested further follow-up in [place].     Patient is not having symptoms at this time except for mild shortness of breath..     Patient now not having angina pectoris or congestive heart failure.     Patient brought CD.  Have made attempts by me and other staff in cardiac Cath Lab to review the CD.  Only 1 image of the right coronary artery could be visualized.     Ischemic cardiomyopathy.  Patient has history of ischemic cardiomyopathy with ejection fraction of 25% in the past.  Echocardiogram 3/13/2023 revealed severe left ventricular dysfunction with ejection fraction of 15%.     Abstinence from smoking     Medications were reviewed and updated.  Patient is on aspirin 81 mg a day Plavix 75 mg a day Zetia lisinopril 5 mg a day metoprolol XL 25 mg a day.     Stay off work till released.  (Date of 5/18/2023 was added to his disability papers.  Insurance company would not accept dev particular date.)     I had a  lengthy discussion with patient and patient's wife at bedside.  The options at this time is to repeat coronary arteriography and assess feasibility of intervention CABG etc.  If patient cannot be revascularized either by intervention or CABG (please note patient has severe left ventricular dysfunction) patient had to be continued Minrath GDMT therapy and consider LifeVest/ICD (single-chamber versus biventricular ICD).  Patient has left bundle branch block.  Patient and patient's wife were made aware of increased risk of dysrhythmia with left ventricular dysfunction and sudden death.     Patient and patient's wife would like to contact hospital in [facility] and see if they can send more information regarding previous cardiac catheterization.  Tentatively cardiac cath (right and left heart cath) was scheduled for next Friday.     Further plan will depend on patient's Progress.  ///////////  3/21/2023  Cardiac cath CT was given to patient.  They were able to get some pictures on the computer which I have reviewed in detail.  Only limited views available.  Difficult to assess the severity of the disease on the computer images.  However patient has severe left ventricular dysfunction and somewhat diffuse coronary artery disease and vessels are small in caliber.  I have discussed with them about having cardiovascular surgery opinion although he may not be a surgical candidate.  Spoke with Bisi and she will try to arrange the appointment.  I can have interventionist also review the films although the images are somewhat suboptimal for accurate interpretation of coronary artery disease.     Patient has significant left ventricle dysfunction.  Suggested having LifeVest placement and he wants to think about it.  Have discussed with him and his wife regarding ICD (single-chamber versus biventricular ICD although patient does not have any congestive heart failure symptoms at this time.  I have explained to them about  arrhythmic risk and sudden death without protection or LifeVest or ICD.     Follow-up in the office in 4 weeks.  ]]]]]]]]]]]]]]]]]]]]]]]]]]]]]     4/20/2023  Patient was seen by Dr. Monge since last visit.  The following note taken and reviewed from his note.  1. Significant two-vessel coronary artery disease status post acute MI with significant LV dysfunction.  The patient is very functional however he is not exerting himself.  He denies chest pain at this time.  I recommend another 4 weeks of medical treatment with the hope of LV recruitment and improvement and then discuss a high risk revascularization possibly with Impella 5.5 standby.  I will see him in my office in 1 month and discuss the possible operation  2. Ischemic cardiomyopathy and congestive heart failure.  Continue present treatment.  3. Hypertension, well controlled continue present regimen     Patient is asymptomatic at this time.  Again discussed with patient and patient's wife regarding LifeVest and they did not show any interest in this.  Patient and family understands risk of sudden death with his coronary artery disease and significant left ventricular dysfunction.  Patient will continue present medications.  Patient to have follow-up echocardiogram on 5/8/2023.  The plan at this time is consider surgical revascularization if left ventricular function shows improvement.  However if patient has continued left ventricular dysfunction cardiac catheterization and intervention with probable Impella support will be considered.  Follow-up in the office in about 4 weeks.     Echocardiogram 5/8/2023    Structurally and functionally normal cardiac valves.    Left ventricle is enlarged with severe and diffuse hypocontractility with ejection fraction of 20%.    ]]]]]]]]]]]]]]]]]]]]]]]]  5/18/2023.  Patient remains asymptomatic although his activities are limited.  Films from [facility] was reviewed.  The images are suboptimal especially to evaluate disease  in the left main coronary artery.  Patient was seen by Dr. Monge recently.  I had a lengthy discussion with patient and patient's wife.  Suggested repeat cardiac catheterization and possible intervention depending on the findings and suitability of coronary system for intervention.  Risks and benefits pros and cons of the procedure possible explained to patient and family.  Have discussed with Dr. Dc interventionalists.  Procedure is tentatively scheduled for 6/2/2023.  (Patient's preference)  ]]]]]]]]]]]]]]]]]]]]]]]  .           Diagnosis Plan   1. Status post angioplasty with stent        2. Ischemic cardiomyopathy        3. LBBB (left bundle branch block)        4. Dyslipidemia        5. Essential hypertension        LAB RESULTS (LAST 7 DAYS)    CBC        BMP        CMP         BNP        TROPONIN        CoAg        Creatinine Clearance  CrCl cannot be calculated (Patient's most recent lab result is older than the maximum 30 days allowed.).    ABG        Radiology  No radiology results for the last day                The following portions of the patient's history were reviewed and updated as appropriate: allergies, current medications, past family history, past medical history, past social history, past surgical history and problem list.    Review of Systems   Constitutional: Negative for malaise/fatigue.   Cardiovascular: Negative for chest pain, leg swelling, palpitations and syncope.   Respiratory: Negative for shortness of breath.    Skin: Negative for rash.   Gastrointestinal: Negative for nausea and vomiting.   Neurological: Negative for dizziness, light-headedness and numbness.   All other systems reviewed and are negative.        Current Outpatient Medications:   •  aspirin 81 MG EC tablet, Take 1 tablet by mouth Daily., Disp: , Rfl:   •  atorvastatin (LIPITOR) 80 MG tablet, Take 1 tablet by mouth once daily, Disp: 90 tablet, Rfl: 0  •  clopidogrel (PLAVIX) 75 MG tablet, Take 1 tablet by mouth once  daily, Disp: 90 tablet, Rfl: 0  •  ezetimibe (ZETIA) 10 MG tablet, Take 1 tablet by mouth once daily, Disp: 90 tablet, Rfl: 0  •  lisinopril (PRINIVIL,ZESTRIL) 5 MG tablet, Take 1 tablet by mouth once daily, Disp: 90 tablet, Rfl: 0  •  metoprolol succinate XL (TOPROL-XL) 50 MG 24 hr tablet, Take 1 tablet by mouth once daily, Disp: 90 tablet, Rfl: 0  •  nitroglycerin (NITROSTAT) 0.4 MG SL tablet, NITROSTAT 0.4 MG SUBL, Disp: , Rfl:   •  VITAMIN D PO, Take  by mouth., Disp: , Rfl:     No Known Allergies    Family History   Problem Relation Age of Onset   • Diabetes Father    • Heart failure Father        Past Surgical History:   Procedure Laterality Date   • CARDIAC CATHETERIZATION  2015   • COLONOSCOPY     • CORONARY ANGIOPLASTY WITH STENT PLACEMENT  2015    stent lad and circumflex distal to the marginal branch   • CORONARY ANGIOPLASTY WITH STENT PLACEMENT  2013    stent to lad       Past Medical History:   Diagnosis Date   • BBB (bundle branch block)     lt bundle branch block   • CAD (coronary artery disease)    • Cardiomyopathy    • CHD (coronary heart disease)    • Congenital heart disease    • Heart murmur    • Hyperlipidemia    • Hypertension    • Myocardial infarction     acute   • Sleep apnea        Family History   Problem Relation Age of Onset   • Diabetes Father    • Heart failure Father        Social History     Socioeconomic History   • Marital status:    Tobacco Use   • Smoking status: Former     Packs/day: 0.00     Years: 15.00     Pack years: 0.00     Types: Cigarettes     Quit date: 2023     Years since quittin.2     Passive exposure: Current   • Smokeless tobacco: Never   Vaping Use   • Vaping Use: Never used   Substance and Sexual Activity   • Alcohol use: Not Currently   • Drug use: No   • Sexual activity: Yes     Partners: Female         Procedures      Objective:       Physical Exam    /55 (BP Location: Right arm, Patient Position: Sitting, Cuff Size: Large  "Adult)   Pulse 63   Ht 177.8 cm (70\")   Wt 82.1 kg (181 lb)   SpO2 97% Comment: RA  BMI 25.97 kg/m²   The patient is alert, oriented and in no distress.    Vital signs as noted above.    Head and neck revealed no carotid bruits or jugular venous distension.  No thyromegaly or lymphadenopathy is present.    Lungs clear.  No wheezing.  Breath sounds are normal bilaterally.    Heart normal first and second heart sounds.  No murmur..  No pericardial rub is present.  No gallop is present.    Abdomen soft and nontender.  No organomegaly is present.    Extremities revealed good peripheral pulses without any pedal edema.    Skin warm and dry.    Musculoskeletal system is grossly normal.    CNS grossly normal.    Reviewed and updated.        "

## 2023-05-07 NOTE — PROGRESS NOTES
Encounter Date:05/18/2023    Last seen 4/20/2023      Patient ID: Zia Lott is a 62 y.o. male.    Chief Complaint:    Status post stent  Left bundle branch block  Recent myocardial infarction  Ischemic cardiomyopathy     History of Present Illness  Patient seen Dr. Monge since last visit.        Since I have last seen, the patient has been without any chest discomfort ,shortness of breath, palpitations, dizziness or syncope.  Denies having any headache ,abdominal pain ,nausea, vomiting , diarrhea constipation, loss of weight or loss of appetite.  Denies having any excessive bruising ,hematuria or blood in the stool.    Review of all systems negative except as indicated.    Reviewed ROS.    Assessment and Plan         ///////////////////////  Impression  ==========  - Recent myocardial infarction in [facility] 2/18/2023  Cardiac cath 2/20/2023 (details not available)  Patient apparently was told he has complex disease and not comfortable in performing intervention in the [facility].      - status post stent to LAD 05/30/2015 and 06/20/2013.     Status post subendocardial myocardial infarction 05/30/2015 prior to stent placement.     - Ischemic cardiomyopathy.-Ejection fraction 15%.-3/13/2023     Echocardiogram 3/13/2023 revealed  Structurally and functionally normal cardiac valves.  Significant left ventricle enlargement with severe and diffuse hypocontractility with ejection fraction of 15%.     Cardiac catheterization 2015 revealed -  Previously placed LAD stent was patent.  Distal LAD has 50% disease.  Chronic and total occlusion of 1st marginal branch.  Distal marginal branch was filling from RCA.  Circumflex coronary artery has 90-95%  distal to the origin of marginal branch.  RCA is a large and dominant vessel that has diffuse 50% disease.     Echocardiogram 09/11/2018 revealed severe left ventricular dysfunction with ejection fraction of 25%.  Moderate mitral and tricuspid regurgitation is  present.     Stress Cardiolite test 09/11/2018 revealed significant anterior apical inferior and poster lateral infarction and ischemia.       -status post anterior wall myocardial infarction 06/2013 and stent placement to LAD 06/2013.       -ischemic cardiomyopathy.  Recent left ventricular ejection fraction was 25-30%.       -smoker -patient was advised to abstain from smoking.        -chronic left bundle-branch block      -hypertension and dyslipidemia      -history of drinking alcohol     -no known allergies  ===========   Plan  =============  Status post myocardial infarction 2/18/2023 and cardiac cath 2/20/2023  Patient recently was in the [facility].  After staying for 1 week patient was supposed to return at that morning developed severe chest pain associated with shortness of breath and profuse sweating.  Patient was seen in the clinic locally and subsequently was flown to [facility].  He had cardiac catheterization.  Details of cardiac catheter not available.  They are concerned about intervening and suggested further follow-up in [place].     Patient is not having symptoms at this time except for mild shortness of breath..     Patient now not having angina pectoris or congestive heart failure.     Patient brought CD.  Have made attempts by me and other staff in cardiac Cath Lab to review the CD.  Only 1 image of the right coronary artery could be visualized.     Ischemic cardiomyopathy.  Patient has history of ischemic cardiomyopathy with ejection fraction of 25% in the past.  Echocardiogram 3/13/2023 revealed severe left ventricular dysfunction with ejection fraction of 15%.     Abstinence from smoking     Medications were reviewed and updated.  Patient is on aspirin 81 mg a day Plavix 75 mg a day Zetia lisinopril 5 mg a day metoprolol XL 25 mg a day.     Stay off work till released.  (Date of 5/18/2023 was added to his disability papers.  Insurance company would not accept dev particular date.)     I had a  lengthy discussion with patient and patient's wife at bedside.  The options at this time is to repeat coronary arteriography and assess feasibility of intervention CABG etc.  If patient cannot be revascularized either by intervention or CABG (please note patient has severe left ventricular dysfunction) patient had to be continued Minrath GDMT therapy and consider LifeVest/ICD (single-chamber versus biventricular ICD).  Patient has left bundle branch block.  Patient and patient's wife were made aware of increased risk of dysrhythmia with left ventricular dysfunction and sudden death.     Patient and patient's wife would like to contact hospital in [facility] and see if they can send more information regarding previous cardiac catheterization.  Tentatively cardiac cath (right and left heart cath) was scheduled for next Friday.     Further plan will depend on patient's Progress.  ///////////  3/21/2023  Cardiac cath CT was given to patient.  They were able to get some pictures on the computer which I have reviewed in detail.  Only limited views available.  Difficult to assess the severity of the disease on the computer images.  However patient has severe left ventricular dysfunction and somewhat diffuse coronary artery disease and vessels are small in caliber.  I have discussed with them about having cardiovascular surgery opinion although he may not be a surgical candidate.  Spoke with Bisi and she will try to arrange the appointment.  I can have interventionist also review the films although the images are somewhat suboptimal for accurate interpretation of coronary artery disease.     Patient has significant left ventricle dysfunction.  Suggested having LifeVest placement and he wants to think about it.  Have discussed with him and his wife regarding ICD (single-chamber versus biventricular ICD although patient does not have any congestive heart failure symptoms at this time.  I have explained to them about  arrhythmic risk and sudden death without protection or LifeVest or ICD.     Follow-up in the office in 4 weeks.  ]]]]]]]]]]]]]]]]]]]]]]]]]]]]]     4/20/2023  Patient was seen by Dr. Monge since last visit.  The following note taken and reviewed from his note.  1. Significant two-vessel coronary artery disease status post acute MI with significant LV dysfunction.  The patient is very functional however he is not exerting himself.  He denies chest pain at this time.  I recommend another 4 weeks of medical treatment with the hope of LV recruitment and improvement and then discuss a high risk revascularization possibly with Impella 5.5 standby.  I will see him in my office in 1 month and discuss the possible operation  2. Ischemic cardiomyopathy and congestive heart failure.  Continue present treatment.  3. Hypertension, well controlled continue present regimen     Patient is asymptomatic at this time.  Again discussed with patient and patient's wife regarding LifeVest and they did not show any interest in this.  Patient and family understands risk of sudden death with his coronary artery disease and significant left ventricular dysfunction.  Patient will continue present medications.  Patient to have follow-up echocardiogram on 5/8/2023.  The plan at this time is consider surgical revascularization if left ventricular function shows improvement.  However if patient has continued left ventricular dysfunction cardiac catheterization and intervention with probable Impella support will be considered.  Follow-up in the office in about 4 weeks.     Echocardiogram 5/8/2023    Structurally and functionally normal cardiac valves.    Left ventricle is enlarged with severe and diffuse hypocontractility with ejection fraction of 20%.    ]]]]]]]]]]]]]]]]]]]]]]]]  5/18/2023.  Patient remains asymptomatic although his activities are limited.  Films from [facility] was reviewed.  The images are suboptimal especially to evaluate disease  in the left main coronary artery.  Patient was seen by Dr. Monge recently.  I had a lengthy discussion with patient and patient's wife.  Suggested repeat cardiac catheterization and possible intervention depending on the findings and suitability of coronary system for intervention.  Risks and benefits pros and cons of the procedure possible explained to patient and family.  Have discussed with Dr. Dc interventionalists.  Procedure is tentatively scheduled for 6/2/2023.  (Patient's preference)  ]]]]]]]]]]]]]]]]]]]]]]]  .           Diagnosis Plan   1. Status post angioplasty with stent        2. Ischemic cardiomyopathy        3. LBBB (left bundle branch block)        4. Dyslipidemia        5. Essential hypertension        LAB RESULTS (LAST 7 DAYS)    CBC        BMP        CMP         BNP        TROPONIN        CoAg        Creatinine Clearance  CrCl cannot be calculated (Patient's most recent lab result is older than the maximum 30 days allowed.).    ABG        Radiology  No radiology results for the last day                The following portions of the patient's history were reviewed and updated as appropriate: allergies, current medications, past family history, past medical history, past social history, past surgical history and problem list.    Review of Systems   Constitutional: Negative for malaise/fatigue.   Cardiovascular: Negative for chest pain, leg swelling, palpitations and syncope.   Respiratory: Negative for shortness of breath.    Skin: Negative for rash.   Gastrointestinal: Negative for nausea and vomiting.   Neurological: Negative for dizziness, light-headedness and numbness.   All other systems reviewed and are negative.        Current Outpatient Medications:   •  aspirin 81 MG EC tablet, Take 1 tablet by mouth Daily., Disp: , Rfl:   •  atorvastatin (LIPITOR) 80 MG tablet, Take 1 tablet by mouth once daily, Disp: 90 tablet, Rfl: 0  •  clopidogrel (PLAVIX) 75 MG tablet, Take 1 tablet by mouth once  daily, Disp: 90 tablet, Rfl: 0  •  ezetimibe (ZETIA) 10 MG tablet, Take 1 tablet by mouth once daily, Disp: 90 tablet, Rfl: 0  •  lisinopril (PRINIVIL,ZESTRIL) 5 MG tablet, Take 1 tablet by mouth once daily, Disp: 90 tablet, Rfl: 0  •  metoprolol succinate XL (TOPROL-XL) 50 MG 24 hr tablet, Take 1 tablet by mouth once daily, Disp: 90 tablet, Rfl: 0  •  nitroglycerin (NITROSTAT) 0.4 MG SL tablet, NITROSTAT 0.4 MG SUBL, Disp: , Rfl:   •  VITAMIN D PO, Take  by mouth., Disp: , Rfl:     No Known Allergies    Family History   Problem Relation Age of Onset   • Diabetes Father    • Heart failure Father        Past Surgical History:   Procedure Laterality Date   • CARDIAC CATHETERIZATION  2015   • COLONOSCOPY     • CORONARY ANGIOPLASTY WITH STENT PLACEMENT  2015    stent lad and circumflex distal to the marginal branch   • CORONARY ANGIOPLASTY WITH STENT PLACEMENT  2013    stent to lad       Past Medical History:   Diagnosis Date   • BBB (bundle branch block)     lt bundle branch block   • CAD (coronary artery disease)    • Cardiomyopathy    • CHD (coronary heart disease)    • Congenital heart disease    • Heart murmur    • Hyperlipidemia    • Hypertension    • Myocardial infarction     acute   • Sleep apnea        Family History   Problem Relation Age of Onset   • Diabetes Father    • Heart failure Father        Social History     Socioeconomic History   • Marital status:    Tobacco Use   • Smoking status: Former     Packs/day: 0.00     Years: 15.00     Pack years: 0.00     Types: Cigarettes     Quit date: 2023     Years since quittin.2     Passive exposure: Current   • Smokeless tobacco: Never   Vaping Use   • Vaping Use: Never used   Substance and Sexual Activity   • Alcohol use: Not Currently   • Drug use: No   • Sexual activity: Yes     Partners: Female         Procedures      Objective:       Physical Exam    /55 (BP Location: Right arm, Patient Position: Sitting, Cuff Size: Large  "Adult)   Pulse 63   Ht 177.8 cm (70\")   Wt 82.1 kg (181 lb)   SpO2 97% Comment: RA  BMI 25.97 kg/m²   The patient is alert, oriented and in no distress.    Vital signs as noted above.    Head and neck revealed no carotid bruits or jugular venous distension.  No thyromegaly or lymphadenopathy is present.    Lungs clear.  No wheezing.  Breath sounds are normal bilaterally.    Heart normal first and second heart sounds.  No murmur..  No pericardial rub is present.  No gallop is present.    Abdomen soft and nontender.  No organomegaly is present.    Extremities revealed good peripheral pulses without any pedal edema.    Skin warm and dry.    Musculoskeletal system is grossly normal.    CNS grossly normal.    Reviewed and updated.        "

## 2023-05-08 ENCOUNTER — HOSPITAL ENCOUNTER (OUTPATIENT)
Dept: CARDIOLOGY | Facility: HOSPITAL | Age: 63
Discharge: HOME | End: 2023-05-08
Admitting: THORACIC SURGERY (CARDIOTHORACIC VASCULAR SURGERY)
Payer: COMMERCIAL

## 2023-05-08 DIAGNOSIS — I25.5 CARDIOMYOPATHY, ISCHEMIC: ICD-10-CM

## 2023-05-08 LAB
BH CV ECHO MEAS - ACS: 1.95 CM
BH CV ECHO MEAS - AO MAX PG: 5.8 MMHG
BH CV ECHO MEAS - AO MEAN PG: 3 MMHG
BH CV ECHO MEAS - AO ROOT DIAM: 3.6 CM
BH CV ECHO MEAS - AO V2 MAX: 120.3 CM/SEC
BH CV ECHO MEAS - AO V2 VTI: 27.8 CM
BH CV ECHO MEAS - AVA(I,D): 3.9 CM2
BH CV ECHO MEAS - EDV(CUBED): 327.7 ML
BH CV ECHO MEAS - EDV(MOD-SP4): 267.6 ML
BH CV ECHO MEAS - EF(MOD-BP): 38 %
BH CV ECHO MEAS - EF(MOD-SP4): 37.7 %
BH CV ECHO MEAS - ESV(CUBED): 187.4 ML
BH CV ECHO MEAS - ESV(MOD-SP4): 166.7 ML
BH CV ECHO MEAS - FS: 17 %
BH CV ECHO MEAS - IVS/LVPW: 0.92 CM
BH CV ECHO MEAS - IVSD: 0.94 CM
BH CV ECHO MEAS - LA DIMENSION: 4.6 CM
BH CV ECHO MEAS - LV DIASTOLIC VOL/BSA (35-75): 135.7 CM2
BH CV ECHO MEAS - LV MASS(C)D: 307 GRAMS
BH CV ECHO MEAS - LV MAX PG: 3.9 MMHG
BH CV ECHO MEAS - LV MEAN PG: 2.07 MMHG
BH CV ECHO MEAS - LV SYSTOLIC VOL/BSA (12-30): 84.5 CM2
BH CV ECHO MEAS - LV V1 MAX: 98.9 CM/SEC
BH CV ECHO MEAS - LV V1 VTI: 21.7 CM
BH CV ECHO MEAS - LVIDD: 6.9 CM
BH CV ECHO MEAS - LVIDS: 5.7 CM
BH CV ECHO MEAS - LVOT AREA: 5 CM2
BH CV ECHO MEAS - LVOT DIAM: 2.5 CM
BH CV ECHO MEAS - LVPWD: 1.03 CM
BH CV ECHO MEAS - MV A MAX VEL: 95.4 CM/SEC
BH CV ECHO MEAS - MV DEC SLOPE: 469.5 CM/SEC2
BH CV ECHO MEAS - MV DEC TIME: 0.2 MSEC
BH CV ECHO MEAS - MV E MAX VEL: 93.2 CM/SEC
BH CV ECHO MEAS - MV E/A: 0.98
BH CV ECHO MEAS - MV MAX PG: 3.6 MMHG
BH CV ECHO MEAS - MV MEAN PG: 1.67 MMHG
BH CV ECHO MEAS - MV V2 VTI: 27.2 CM
BH CV ECHO MEAS - MVA(VTI): 4 CM2
BH CV ECHO MEAS - PA V2 MAX: 97.3 CM/SEC
BH CV ECHO MEAS - QP/QS: 0.5
BH CV ECHO MEAS - RV MAX PG: 1.93 MMHG
BH CV ECHO MEAS - RV V1 MAX: 69.4 CM/SEC
BH CV ECHO MEAS - RV V1 VTI: 16.3 CM
BH CV ECHO MEAS - RVDD: 2.48 CM
BH CV ECHO MEAS - RVOT DIAM: 2.05 CM
BH CV ECHO MEAS - SI(MOD-SP4): 51.2 ML/M2
BH CV ECHO MEAS - SV(LVOT): 108.4 ML
BH CV ECHO MEAS - SV(MOD-SP4): 100.9 ML
BH CV ECHO MEAS - SV(RVOT): 53.7 ML
MAXIMAL PREDICTED HEART RATE: 156 BPM
STRESS TARGET HR: 133 BPM

## 2023-05-08 PROCEDURE — 93306 TTE W/DOPPLER COMPLETE: CPT | Performed by: INTERNAL MEDICINE

## 2023-05-08 PROCEDURE — 93306 TTE W/DOPPLER COMPLETE: CPT

## 2023-05-11 ENCOUNTER — OFFICE VISIT (OUTPATIENT)
Dept: CARDIAC SURGERY | Facility: CLINIC | Age: 63
End: 2023-05-11
Payer: COMMERCIAL

## 2023-05-11 VITALS
WEIGHT: 180 LBS | DIASTOLIC BLOOD PRESSURE: 67 MMHG | TEMPERATURE: 97.7 F | RESPIRATION RATE: 20 BRPM | BODY MASS INDEX: 25.77 KG/M2 | SYSTOLIC BLOOD PRESSURE: 115 MMHG | HEIGHT: 70 IN | OXYGEN SATURATION: 96 % | HEART RATE: 62 BPM

## 2023-05-11 DIAGNOSIS — F17.210 CIGARETTE SMOKER: ICD-10-CM

## 2023-05-11 DIAGNOSIS — Z95.820 STATUS POST ANGIOPLASTY WITH STENT: ICD-10-CM

## 2023-05-11 DIAGNOSIS — I44.7 LBBB (LEFT BUNDLE BRANCH BLOCK): ICD-10-CM

## 2023-05-11 DIAGNOSIS — I21.02 ST ELEVATION MYOCARDIAL INFARCTION INVOLVING LEFT ANTERIOR DESCENDING (LAD) CORONARY ARTERY: ICD-10-CM

## 2023-05-11 DIAGNOSIS — F41.1 GAD (GENERALIZED ANXIETY DISORDER): ICD-10-CM

## 2023-05-11 DIAGNOSIS — I25.5 CARDIOMYOPATHY, ISCHEMIC: Primary | ICD-10-CM

## 2023-05-11 DIAGNOSIS — I25.10 CHRONIC CORONARY ARTERY DISEASE: ICD-10-CM

## 2023-05-11 PROCEDURE — 99024 POSTOP FOLLOW-UP VISIT: CPT | Performed by: THORACIC SURGERY (CARDIOTHORACIC VASCULAR SURGERY)

## 2023-05-11 NOTE — LETTER
May 15, 2023     Bryan Beckham MD  [address]  [place] [address]    Patient: Zia Lott   YOB: 1960   Date of Visit: 5/11/2023       Dear Dr. Chapincito MD:    Thank you for referring Zia Lott to me for evaluation. Below are the relevant portions of my assessment and plan of care.    If you have questions, please do not hesitate to call me. I look forward to following Zia along with you.         Sincerely,        Pedro Monge MD        CC: MD Mariposa Garcia Sebastian, MD  05/15/23 1633  Sign when Signing Visit  [facility] note  I saw Mr. Lott in my office in follow-up 6 weeks post my initial evaluation.  I saw him initially the last day of March when he was referred to me because of an acute MI that occurred in the [facility].  At that time he had a cardiac cath in the [facility] the [place] that showed a tight proximal LAD stenosis and a 50% post stent RCA stenosis.  The ejection fraction was approximate 15 to 20%.  He was treated medically and I saw him in the office for possible revascularization.  He looked very active which has mild shortness of breath on exertion and no chest pain.  He is a status post PCI of the RCA in the past.  He was an active smoker until recently he has other risk factors.  I elected to repeat an echocardiogram in 6 weeks which showed ejection fraction to be approximately 20% with a dilated left ventricle.  There is no mitral regurgitation or RV dysfunction.  His symptoms are unchanged.  He is here for follow-up.  His vitals are stable, his cor is regular without murmurs or gallops, his lungs are clear, there are no carotid bruits, the abdomen is benign, the feet are warm and he is neurologically intact.  I discussed with him the options and my recommendation of repeating the cardiac cath and based on that consider surgery if the anatomy is favorable.  Given his age and functional status, I believe an operation  may be a better option than a PCI.  However, he does have an established cardiomyopathy and surgery will be with the risk involved.  Once the cardiac cath is performed, I will discuss the case with the cardiology team and make the final recommendation

## 2023-05-11 NOTE — LETTER
May 15, 2023       No Recipients    Patient: Zia Lott   YOB: 1960   Date of Visit: 5/11/2023       Dear Dr. Alves Recipients:    Thank you for referring Zia Lott to me for evaluation. Below are the relevant portions of my assessment and plan of care.    If you have questions, please do not hesitate to call me. I look forward to following Zia along with you.         Sincerely,        Pedro Monge MD        CC:   No Recipients    Pedro Monge MD  05/15/23 1633  Sign when Signing Visit  [facility] note  I saw Mr. Lott in my office in follow-up 6 weeks post my initial evaluation.  I saw him initially the last day of March when he was referred to me because of an acute MI that occurred in the [facility].  At that time he had a cardiac cath in the [facility] the [place] that showed a tight proximal LAD stenosis and a 50% post stent RCA stenosis.  The ejection fraction was approximate 15 to 20%.  He was treated medically and I saw him in the office for possible revascularization.  He looked very active which has mild shortness of breath on exertion and no chest pain.  He is a status post PCI of the RCA in the past.  He was an active smoker until recently he has other risk factors.  I elected to repeat an echocardiogram in 6 weeks which showed ejection fraction to be approximately 20% with a dilated left ventricle.  There is no mitral regurgitation or RV dysfunction.  His symptoms are unchanged.  He is here for follow-up.  His vitals are stable, his cor is regular without murmurs or gallops, his lungs are clear, there are no carotid bruits, the abdomen is benign, the feet are warm and he is neurologically intact.  I discussed with him the options and my recommendation of repeating the cardiac cath and based on that consider surgery if the anatomy is favorable.  Given his age and functional status, I believe an operation may be a better option than a PCI.  However, he does have  an established cardiomyopathy and surgery will be with the risk involved.  Once the cardiac cath is performed, I will discuss the case with the cardiology team and make the final recommendation

## 2023-05-15 NOTE — H&P (VIEW-ONLY)
[facility] note  I saw Mr. Lott in my office in follow-up 6 weeks post my initial evaluation.  I saw him initially the last day of March when he was referred to me because of an acute MI that occurred in the [facility].  At that time he had a cardiac cath in the [facility] in the [place] that showed a tight proximal LAD stenosis and a 50% post stent RCA stenosis.  The ejection fraction was approximate 15 to 20%.  He was treated medically and I saw him in the office for possible revascularization.  He looked very active which has mild shortness of breath on exertion and no chest pain.  He is a status post PCI of the RCA in the past.  He was an active smoker until recently he has other risk factors.  I elected to repeat an echocardiogram in 6 weeks which showed ejection fraction to be approximately 20% with a dilated left ventricle.  There is no mitral regurgitation or RV dysfunction.  His symptoms are unchanged.  He is here for follow-up.  His vitals are stable, his cor is regular without murmurs or gallops, his lungs are clear, there are no carotid bruits, the abdomen is benign, the feet are warm and he is neurologically intact.  I discussed with him the options and my recommendation of repeating the cardiac cath and based on that consider surgery if the anatomy is favorable.  Given his age and functional status, I believe an operation may be a better option than a PCI.  However, he does have an established cardiomyopathy and surgery will be with the risk involved.  Once the cardiac cath is performed, I will discuss the case with the cardiology team and make the final recommendation

## 2023-05-15 NOTE — PROGRESS NOTES
[facility] note  I saw Mr. Lott in my office in follow-up 6 weeks post my initial evaluation.  I saw him initially the last day of March when he was referred to me because of an acute MI that occurred in the [facility].  At that time he had a cardiac cath in the [facility] in the [place] that showed a tight proximal LAD stenosis and a 50% post stent RCA stenosis.  The ejection fraction was approximate 15 to 20%.  He was treated medically and I saw him in the office for possible revascularization.  He looked very active which has mild shortness of breath on exertion and no chest pain.  He is a status post PCI of the RCA in the past.  He was an active smoker until recently he has other risk factors.  I elected to repeat an echocardiogram in 6 weeks which showed ejection fraction to be approximately 20% with a dilated left ventricle.  There is no mitral regurgitation or RV dysfunction.  His symptoms are unchanged.  He is here for follow-up.  His vitals are stable, his cor is regular without murmurs or gallops, his lungs are clear, there are no carotid bruits, the abdomen is benign, the feet are warm and he is neurologically intact.  I discussed with him the options and my recommendation of repeating the cardiac cath and based on that consider surgery if the anatomy is favorable.  Given his age and functional status, I believe an operation may be a better option than a PCI.  However, he does have an established cardiomyopathy and surgery will be with the risk involved.  Once the cardiac cath is performed, I will discuss the case with the cardiology team and make the final recommendation

## 2023-05-18 ENCOUNTER — OFFICE VISIT (OUTPATIENT)
Dept: CARDIOLOGY | Facility: CLINIC | Age: 63
End: 2023-05-18
Payer: COMMERCIAL

## 2023-05-18 VITALS
DIASTOLIC BLOOD PRESSURE: 55 MMHG | BODY MASS INDEX: 25.91 KG/M2 | HEIGHT: 70 IN | SYSTOLIC BLOOD PRESSURE: 118 MMHG | WEIGHT: 181 LBS | OXYGEN SATURATION: 97 % | HEART RATE: 63 BPM

## 2023-05-18 DIAGNOSIS — I25.5 ISCHEMIC CARDIOMYOPATHY: ICD-10-CM

## 2023-05-18 DIAGNOSIS — E78.5 DYSLIPIDEMIA: ICD-10-CM

## 2023-05-18 DIAGNOSIS — Z95.820 STATUS POST ANGIOPLASTY WITH STENT: Primary | ICD-10-CM

## 2023-05-18 DIAGNOSIS — I10 ESSENTIAL HYPERTENSION: ICD-10-CM

## 2023-05-18 DIAGNOSIS — I44.7 LBBB (LEFT BUNDLE BRANCH BLOCK): ICD-10-CM

## 2023-05-23 RX ORDER — EZETIMIBE 10 MG/1
TABLET ORAL
Qty: 90 TABLET | Refills: 0 | Status: SHIPPED | OUTPATIENT
Start: 2023-05-23

## 2023-05-31 ENCOUNTER — LAB (OUTPATIENT)
Dept: LAB | Facility: HOSPITAL | Age: 63
End: 2023-05-31

## 2023-05-31 ENCOUNTER — HOSPITAL ENCOUNTER (OUTPATIENT)
Dept: GENERAL RADIOLOGY | Facility: HOSPITAL | Age: 63
Discharge: HOME | End: 2023-05-31

## 2023-05-31 ENCOUNTER — HOSPITAL ENCOUNTER (OUTPATIENT)
Dept: CARDIOLOGY | Facility: HOSPITAL | Age: 63
Discharge: HOME | End: 2023-05-31

## 2023-05-31 DIAGNOSIS — I25.5 ISCHEMIC CARDIOMYOPATHY: ICD-10-CM

## 2023-05-31 DIAGNOSIS — E78.5 DYSLIPIDEMIA: ICD-10-CM

## 2023-05-31 DIAGNOSIS — Z95.820 STATUS POST ANGIOPLASTY WITH STENT: ICD-10-CM

## 2023-05-31 DIAGNOSIS — I10 ESSENTIAL HYPERTENSION: ICD-10-CM

## 2023-05-31 DIAGNOSIS — I44.7 LBBB (LEFT BUNDLE BRANCH BLOCK): ICD-10-CM

## 2023-05-31 LAB
ANION GAP SERPL CALCULATED.3IONS-SCNC: 7 MMOL/L (ref 5–15)
BUN SERPL-MCNC: 15 MG/DL (ref 8–23)
BUN/CREAT SERPL: 16.9 (ref 7–25)
CALCIUM SPEC-SCNC: 9.3 MG/DL (ref 8.6–10.5)
CHLORIDE SERPL-SCNC: 102 MMOL/L (ref 98–107)
CHOLEST SERPL-MCNC: 112 MG/DL (ref 0–200)
CO2 SERPL-SCNC: 28 MMOL/L (ref 22–29)
CREAT SERPL-MCNC: 0.89 MG/DL (ref 0.76–1.27)
DEPRECATED RDW RBC AUTO: 43.3 FL (ref 37–54)
EGFRCR SERPLBLD CKD-EPI 2021: 95.7 ML/MIN/1.73
ERYTHROCYTE [DISTWIDTH] IN BLOOD BY AUTOMATED COUNT: 12.7 % (ref 12.3–15.4)
GLUCOSE SERPL-MCNC: 106 MG/DL (ref 65–99)
HCT VFR BLD AUTO: 42.1 % (ref 37.5–51)
HDLC SERPL-MCNC: 46 MG/DL (ref 40–60)
HGB BLD-MCNC: 14 G/DL (ref 13–17.7)
INR PPP: 0.97 (ref 0.93–1.1)
LDLC SERPL CALC-MCNC: 51 MG/DL (ref 0–100)
LDLC/HDLC SERPL: 1.13 {RATIO}
MCH RBC QN AUTO: 31.3 PG (ref 26.6–33)
MCHC RBC AUTO-ENTMCNC: 33.3 G/DL (ref 31.5–35.7)
MCV RBC AUTO: 93.8 FL (ref 79–97)
PLATELET # BLD AUTO: 198 10*3/MM3 (ref 140–450)
PMV BLD AUTO: 8.5 FL (ref 6–12)
POTASSIUM SERPL-SCNC: 4.7 MMOL/L (ref 3.5–5.2)
PROTHROMBIN TIME: 10.4 SECONDS (ref 9.6–11.7)
QT INTERVAL: 396 MS
RBC # BLD AUTO: 4.48 10*6/MM3 (ref 4.14–5.8)
SODIUM SERPL-SCNC: 137 MMOL/L (ref 136–145)
TRIGL SERPL-MCNC: 71 MG/DL (ref 0–150)
VLDLC SERPL-MCNC: 15 MG/DL (ref 5–40)
WBC NRBC COR # BLD: 7.8 10*3/MM3 (ref 3.4–10.8)

## 2023-05-31 PROCEDURE — 93005 ELECTROCARDIOGRAM TRACING: CPT | Performed by: INTERNAL MEDICINE

## 2023-05-31 PROCEDURE — 71046 X-RAY EXAM CHEST 2 VIEWS: CPT

## 2023-05-31 PROCEDURE — 80048 BASIC METABOLIC PNL TOTAL CA: CPT

## 2023-05-31 PROCEDURE — 36415 COLL VENOUS BLD VENIPUNCTURE: CPT

## 2023-05-31 PROCEDURE — 85610 PROTHROMBIN TIME: CPT

## 2023-05-31 PROCEDURE — 85027 COMPLETE CBC AUTOMATED: CPT

## 2023-05-31 PROCEDURE — 80061 LIPID PANEL: CPT

## 2023-06-01 RX ORDER — NITROGLYCERIN 0.4 MG/1
0.4 TABLET SUBLINGUAL
COMMUNITY

## 2023-06-01 RX ORDER — ERGOCALCIFEROL (VITAMIN D2) 10 MCG
400 TABLET ORAL DAILY
COMMUNITY

## 2023-06-02 ENCOUNTER — HOSPITAL ENCOUNTER (OUTPATIENT)
Facility: HOSPITAL | Age: 63
Setting detail: HOSPITAL OUTPATIENT SURGERY
Discharge: HOME | End: 2023-06-02
Attending: INTERNAL MEDICINE | Admitting: INTERNAL MEDICINE
Payer: COMMERCIAL

## 2023-06-02 ENCOUNTER — APPOINTMENT (OUTPATIENT)
Dept: CARDIOLOGY | Facility: HOSPITAL | Age: 63
End: 2023-06-02
Payer: COMMERCIAL

## 2023-06-02 VITALS
SYSTOLIC BLOOD PRESSURE: 131 MMHG | RESPIRATION RATE: 18 BRPM | DIASTOLIC BLOOD PRESSURE: 64 MMHG | HEART RATE: 77 BPM | WEIGHT: 178.79 LBS | TEMPERATURE: 98 F | HEIGHT: 70 IN | BODY MASS INDEX: 25.6 KG/M2 | OXYGEN SATURATION: 99 %

## 2023-06-02 DIAGNOSIS — I10 ESSENTIAL HYPERTENSION: ICD-10-CM

## 2023-06-02 DIAGNOSIS — Z95.820 STATUS POST ANGIOPLASTY WITH STENT: ICD-10-CM

## 2023-06-02 DIAGNOSIS — I25.5 ISCHEMIC CARDIOMYOPATHY: ICD-10-CM

## 2023-06-02 DIAGNOSIS — E78.5 DYSLIPIDEMIA: ICD-10-CM

## 2023-06-02 DIAGNOSIS — I44.7 LBBB (LEFT BUNDLE BRANCH BLOCK): ICD-10-CM

## 2023-06-02 LAB
ACT BLD: 161 SECONDS (ref 89–137)
ACT BLD: 185 SECONDS (ref 89–137)
BH CV XLRA MEAS - DIST GSV CALF DIST LEFT: 0.33 CM
BH CV XLRA MEAS - DIST GSV CALF DIST RIGHT: 0.27 CM
BH CV XLRA MEAS - DIST GSV THIGH DIST LEFT: 0.49 CM
BH CV XLRA MEAS - DIST GSV THIGH DIST RIGHT: 0.42 CM
BH CV XLRA MEAS - MID GSV CALF LEFT: 0.34 CM
BH CV XLRA MEAS - MID GSV CALF RIGHT: 0.32 CM
BH CV XLRA MEAS - MID GSV THIGH  LEFT: 0.54 CM
BH CV XLRA MEAS - MID GSV THIGH  RIGHT: 0.34 CM
BH CV XLRA MEAS - PROX GSV CALF DIST LEFT: 0.3 CM
BH CV XLRA MEAS - PROX GSV CALF DIST RIGHT: 0.33 CM
BH CV XLRA MEAS - PROX GSV THIGH  LEFT: 0.52 CM
BH CV XLRA MEAS - PROX GSV THIGH  RIGHT: 0.4 CM
BH CV XLRA MEAS LEFT DIST CCA EDV: -18 CM/SEC
BH CV XLRA MEAS LEFT DIST CCA PSV: -75.8 CM/SEC
BH CV XLRA MEAS LEFT DIST ICA EDV: -17.1 CM/SEC
BH CV XLRA MEAS LEFT DIST ICA PSV: -55.8 CM/SEC
BH CV XLRA MEAS LEFT ICA/CCA RATIO: 0.62
BH CV XLRA MEAS LEFT PROX CCA EDV: 16.2 CM/SEC
BH CV XLRA MEAS LEFT PROX CCA PSV: 101 CM/SEC
BH CV XLRA MEAS LEFT PROX ECA PSV: -78.9 CM/SEC
BH CV XLRA MEAS LEFT PROX ICA EDV: 16.2 CM/SEC
BH CV XLRA MEAS LEFT PROX ICA PSV: 62.8 CM/SEC
BH CV XLRA MEAS LEFT PROX SCLA PSV: 149 CM/SEC
BH CV XLRA MEAS LEFT VERTEBRAL A EDV: 12.7 CM/SEC
BH CV XLRA MEAS LEFT VERTEBRAL A PSV: 58.8 CM/SEC
BH CV XLRA MEAS RIGHT DIST CCA EDV: 18 CM/SEC
BH CV XLRA MEAS RIGHT DIST CCA PSV: 75.8 CM/SEC
BH CV XLRA MEAS RIGHT DIST ICA EDV: -12.3 CM/SEC
BH CV XLRA MEAS RIGHT DIST ICA PSV: -57.1 CM/SEC
BH CV XLRA MEAS RIGHT ICA/CCA RATIO: 0.92
BH CV XLRA MEAS RIGHT PROX CCA EDV: -9.1 CM/SEC
BH CV XLRA MEAS RIGHT PROX CCA PSV: -82.5 CM/SEC
BH CV XLRA MEAS RIGHT PROX ECA PSV: -103 CM/SEC
BH CV XLRA MEAS RIGHT PROX ICA EDV: -16.9 CM/SEC
BH CV XLRA MEAS RIGHT PROX ICA PSV: -75.9 CM/SEC
BH CV XLRA MEAS RIGHT PROX SCLA PSV: 98 CM/SEC
BH CV XLRA MEAS RIGHT VERTEBRAL A EDV: 9.2 CM/SEC
BH CV XLRA MEAS RIGHT VERTEBRAL A PSV: 40 CM/SEC
LEFT ARM BP: NORMAL MMHG
MAXIMAL PREDICTED HEART RATE: 156 BPM
MAXIMAL PREDICTED HEART RATE: 156 BPM
RIGHT ARM BP: NORMAL MMHG
STRESS TARGET HR: 133 BPM
STRESS TARGET HR: 133 BPM

## 2023-06-02 PROCEDURE — C1894 INTRO/SHEATH, NON-LASER: HCPCS | Performed by: INTERNAL MEDICINE

## 2023-06-02 PROCEDURE — C1753 CATH, INTRAVAS ULTRASOUND: HCPCS | Performed by: INTERNAL MEDICINE

## 2023-06-02 PROCEDURE — 25010000002 FENTANYL CITRATE (PF) 100 MCG/2ML SOLUTION: Performed by: INTERNAL MEDICINE

## 2023-06-02 PROCEDURE — C1769 GUIDE WIRE: HCPCS | Performed by: INTERNAL MEDICINE

## 2023-06-02 PROCEDURE — 93460 R&L HRT ART/VENTRICLE ANGIO: CPT | Performed by: INTERNAL MEDICINE

## 2023-06-02 PROCEDURE — 99152 MOD SED SAME PHYS/QHP 5/>YRS: CPT | Performed by: INTERNAL MEDICINE

## 2023-06-02 PROCEDURE — 25010000002 HEPARIN (PORCINE) PER 1000 UNITS: Performed by: INTERNAL MEDICINE

## 2023-06-02 PROCEDURE — 99153 MOD SED SAME PHYS/QHP EA: CPT | Performed by: INTERNAL MEDICINE

## 2023-06-02 PROCEDURE — 0 LIDOCAINE 1 % SOLUTION: Performed by: INTERNAL MEDICINE

## 2023-06-02 PROCEDURE — 92978 ENDOLUMINL IVUS OCT C 1ST: CPT | Performed by: INTERNAL MEDICINE

## 2023-06-02 PROCEDURE — 85347 COAGULATION TIME ACTIVATED: CPT

## 2023-06-02 PROCEDURE — 93880 EXTRACRANIAL BILAT STUDY: CPT

## 2023-06-02 PROCEDURE — 96360 HYDRATION IV INFUSION INIT: CPT | Performed by: INTERNAL MEDICINE

## 2023-06-02 PROCEDURE — 25010000002 MIDAZOLAM PER 1 MG: Performed by: INTERNAL MEDICINE

## 2023-06-02 PROCEDURE — 25510000001 IOPAMIDOL PER 1 ML: Performed by: INTERNAL MEDICINE

## 2023-06-02 PROCEDURE — C1887 CATHETER, GUIDING: HCPCS | Performed by: INTERNAL MEDICINE

## 2023-06-02 PROCEDURE — 93799 UNLISTED CV SVC/PROCEDURE: CPT | Performed by: INTERNAL MEDICINE

## 2023-06-02 PROCEDURE — 36415 COLL VENOUS BLD VENIPUNCTURE: CPT | Performed by: INTERNAL MEDICINE

## 2023-06-02 PROCEDURE — 93970 EXTREMITY STUDY: CPT

## 2023-06-02 RX ORDER — HEPARIN SODIUM 1000 [USP'U]/ML
INJECTION, SOLUTION INTRAVENOUS; SUBCUTANEOUS
Status: DISCONTINUED | OUTPATIENT
Start: 2023-06-02 | End: 2023-06-02 | Stop reason: HOSPADM

## 2023-06-02 RX ORDER — ACETAMINOPHEN 325 MG/1
650 TABLET ORAL EVERY 4 HOURS PRN
Status: DISCONTINUED | OUTPATIENT
Start: 2023-06-02 | End: 2023-06-02 | Stop reason: HOSPADM

## 2023-06-02 RX ORDER — SODIUM CHLORIDE 9 MG/ML
250 INJECTION, SOLUTION INTRAVENOUS ONCE AS NEEDED
Status: DISCONTINUED | OUTPATIENT
Start: 2023-06-02 | End: 2023-06-02 | Stop reason: HOSPADM

## 2023-06-02 RX ORDER — ONDANSETRON 4 MG/1
4 TABLET, FILM COATED ORAL EVERY 6 HOURS PRN
Status: DISCONTINUED | OUTPATIENT
Start: 2023-06-02 | End: 2023-06-02 | Stop reason: HOSPADM

## 2023-06-02 RX ORDER — NITROGLYCERIN 0.4 MG/1
0.4 TABLET SUBLINGUAL
Status: DISCONTINUED | OUTPATIENT
Start: 2023-06-02 | End: 2023-06-02 | Stop reason: HOSPADM

## 2023-06-02 RX ORDER — DIPHENHYDRAMINE HCL 25 MG
25 CAPSULE ORAL EVERY 6 HOURS PRN
Status: DISCONTINUED | OUTPATIENT
Start: 2023-06-02 | End: 2023-06-02 | Stop reason: HOSPADM

## 2023-06-02 RX ORDER — MIDAZOLAM HYDROCHLORIDE 1 MG/ML
INJECTION INTRAMUSCULAR; INTRAVENOUS
Status: DISCONTINUED | OUTPATIENT
Start: 2023-06-02 | End: 2023-06-02 | Stop reason: HOSPADM

## 2023-06-02 RX ORDER — FENTANYL CITRATE 50 UG/ML
INJECTION, SOLUTION INTRAMUSCULAR; INTRAVENOUS
Status: DISCONTINUED | OUTPATIENT
Start: 2023-06-02 | End: 2023-06-02 | Stop reason: HOSPADM

## 2023-06-02 RX ORDER — LIDOCAINE HYDROCHLORIDE 10 MG/ML
INJECTION, SOLUTION INFILTRATION; PERINEURAL
Status: DISCONTINUED | OUTPATIENT
Start: 2023-06-02 | End: 2023-06-02 | Stop reason: HOSPADM

## 2023-06-02 RX ORDER — SODIUM CHLORIDE 9 MG/ML
30 INJECTION, SOLUTION INTRAVENOUS CONTINUOUS
Status: DISCONTINUED | OUTPATIENT
Start: 2023-06-02 | End: 2023-06-02 | Stop reason: HOSPADM

## 2023-06-02 RX ORDER — ONDANSETRON 2 MG/ML
4 INJECTION INTRAMUSCULAR; INTRAVENOUS EVERY 6 HOURS PRN
Status: DISCONTINUED | OUTPATIENT
Start: 2023-06-02 | End: 2023-06-02 | Stop reason: HOSPADM

## 2023-06-02 RX ADMIN — SODIUM CHLORIDE 30 ML/HR: 0.9 INJECTION, SOLUTION INTRAVENOUS at 06:44

## 2023-06-02 NOTE — DISCHARGE INSTR - ACTIVITY
Post Cath Instructions      Call Dr. Baumann’s office to schedule a follow up appointment.  Specific Physician Instructions: ***    Drink plenty of fluids for the next 24 hours.  This helps to eliminate the dye used in your procedure through urination.  You may resume a normal diet; however, try to avoid foods that would cause gas or constipation.    Sedative medication given to you during your catheterization may decrease your judgement and reaction time for up to 24-48 hours.  Therefore:  DO NOT drive or operate hazardous machinery (48 hours)  DO NOT consume alcoholic beverages  DO NOT make any important/legal decisions  Have someone stay with you for at least 24 hours    To allow proper healing and prevent bleeding, the following activities are to be strictly avoided for the next 24-48 hours:  Excessive bending at wound site  Straining (anything that would tense up muscles around the affected puncture site)  Lifting objects greater than 5 pounds, pushing, or pulling for 5 days    For Groin Cases:  Refrain from sexual activity  Refrain from running or vigorous walking  No prolonged sitting or standing  Limit stair climbing as much as possible    Keep the puncture site clean and dry.  You may remove the dressing tomorrow and replace it with a band-aid for at least one additional day.  Gently clean the site with mild soap and water.  No scrubbing/rubbing and lightly pat the area dry.  Showers are acceptable; however, avoid submerging in water (tub baths, hot tubs, swimming pools, etc…) for at least one week.  The site should be completely healed before resuming these activities to reduce the risk of infection.  Check the site often.  Watch for signs and symptoms of infection and notify your physician if any of the following occur:  Bleeding or an increase in swelling at the puncture site  Fever  Increased soreness around puncture site  Foul odor or significant drainage from the puncture site  Swelling, redness, or  warmth at the puncture site    **A bruise or small “pea sized” lump under the skin at the puncture site is not unusual.  This should disappear within 3-4 weeks.**  CONTACT YOUR PHYSICIAN OR CALL 911 IF YOU EXPERIENCE ANY OF THE FOLLOWING:  Increased angina (chest pain) or frequent sensations of pressure, burning, pain, or other discomfort in the chest, arm, jaws, or stomach  Lightheadedness, dizziness, faint feeling, sweating, or difficulty breathing  Odd sensation changes like numbness, tingling, coldness, or pain in the arm or leg in which the catheter was inserted  Limb in which the catheter was inserted becomes pale/bluish in color    IMPORTANT:  Although this occurs very rarely, if you should develop bright red or excessive bleeding, feel a “pop” inside at the insertion site, or notice a sudden increase in swelling larger than a walnut, you should call 911.  Hold continuous firm pressure to the access site until emergency personnel arrive.  It is best if someone else can do this for you.

## 2023-06-02 NOTE — Clinical Note
Prepped: right groin. Prepped with: ChloraPrep and Hibiclens. The site was clipped. The patient was draped in a sterile fashion.

## 2023-06-02 NOTE — INTERVAL H&P NOTE
H&P reviewed. The patient was examined and there are no changes to the H&P.  The patient was recently seen by Dr. Monge on 5/11 in office consultation s/p AMI in the [facility].  Today he presents for LHC with Dr. Baumann.  Dr. Monge and I saw the patient in [facility] s/p LHC.  Dr. Monge discussed the findings with the patient and his wife.  He has recommended surgical revascularization with CABG and possible Impella/IABP support.  The patient will be discharged home and our office will call him on Monday to schedule surgery in the next 1 to 2 weeks.  Questions answered with verbalized understanding

## 2023-06-05 ENCOUNTER — PREP FOR SURGERY (OUTPATIENT)
Dept: CARDIAC SURGERY | Facility: CLINIC | Age: 63
End: 2023-06-05
Payer: COMMERCIAL

## 2023-06-05 DIAGNOSIS — I25.10 CAD (CORONARY ARTERY DISEASE): Primary | ICD-10-CM

## 2023-06-05 LAB — ACT BLD: 233 SECONDS (ref 89–137)

## 2023-06-05 RX ORDER — CHLORHEXIDINE GLUCONATE 0.12 MG/ML
15 RINSE ORAL EVERY 12 HOURS
OUTPATIENT
Start: 2023-06-05 | End: 2023-06-06

## 2023-06-05 RX ORDER — CHLORHEXIDINE GLUCONATE 500 MG/1
1 CLOTH TOPICAL EVERY 12 HOURS PRN
OUTPATIENT
Start: 2023-06-05

## 2023-06-05 RX ORDER — CHLORHEXIDINE GLUCONATE 0.12 MG/ML
15 RINSE ORAL ONCE
OUTPATIENT
Start: 2023-06-05 | End: 2023-06-05

## 2023-06-06 ENCOUNTER — TELEPHONE (OUTPATIENT)
Dept: PREADMISSION TESTING | Facility: HOSPITAL | Age: 63
End: 2023-06-06
Payer: COMMERCIAL

## 2023-06-06 DIAGNOSIS — Z01.818 PRE-OP EVALUATION: Primary | ICD-10-CM

## 2023-06-07 ENCOUNTER — PREP FOR SURGERY (OUTPATIENT)
Dept: CARDIAC SURGERY | Facility: CLINIC | Age: 63
End: 2023-06-07
Payer: COMMERCIAL

## 2023-06-07 ENCOUNTER — TELEPHONE (OUTPATIENT)
Dept: CARDIAC SURGERY | Facility: CLINIC | Age: 63
End: 2023-06-07
Payer: COMMERCIAL

## 2023-06-07 NOTE — TELEPHONE ENCOUNTER
Spoke to patient's wife with PAT and surgery instructions. Kelvin will call to set up PAT date and time. Surgery is on 6- as a to follow case with an arrival time of 060. His last dose of PLAVIX will be on 6- per Virgie.  Verbal expression of these instructions. Instructed to call back with any further questions

## 2023-06-11 LAB — QT INTERVAL: 396 MS

## 2023-06-15 ENCOUNTER — TELEPHONE (OUTPATIENT)
Dept: CARDIOLOGY | Facility: CLINIC | Age: 63
End: 2023-06-15
Payer: COMMERCIAL

## 2023-06-15 NOTE — TELEPHONE ENCOUNTER
PATIENT CALLED IN TO ADVISE THAT THEY ARE NEEDING OFFICE VISIT NOTES, THAT HE IS CURRENTLY OUT OF WORK, AND THAT HE HAS AN UPCOMING SURGERY.

## 2023-06-15 NOTE — TELEPHONE ENCOUNTER
RECORD REQUEST WAS SENT TO [facility] ON 06/05/23 AND THEY SEND OUT THE RECORDS. I FAXED THIS INFORMATION TO [facility] TODAY WITH  [facility]'S CONTACT INFORMATION.

## 2023-06-22 PROBLEM — I25.10 CAD (CORONARY ARTERY DISEASE): Status: ACTIVE | Noted: 2023-06-22

## 2023-06-27 PROBLEM — I50.23 ACUTE ON CHRONIC SYSTOLIC HEART FAILURE: Status: ACTIVE | Noted: 2023-06-27

## 2023-07-24 ENCOUNTER — OFFICE VISIT (OUTPATIENT)
Dept: CARDIOLOGY | Facility: CLINIC | Age: 63
End: 2023-07-24
Payer: COMMERCIAL

## 2023-07-24 VITALS
WEIGHT: 170 LBS | BODY MASS INDEX: 24.34 KG/M2 | HEART RATE: 67 BPM | HEIGHT: 70 IN | OXYGEN SATURATION: 97 % | SYSTOLIC BLOOD PRESSURE: 105 MMHG | DIASTOLIC BLOOD PRESSURE: 69 MMHG

## 2023-07-24 DIAGNOSIS — I44.7 LBBB (LEFT BUNDLE BRANCH BLOCK): ICD-10-CM

## 2023-07-24 DIAGNOSIS — Z95.1 HX OF CABG: ICD-10-CM

## 2023-07-24 DIAGNOSIS — E78.5 DYSLIPIDEMIA: ICD-10-CM

## 2023-07-24 DIAGNOSIS — I10 ESSENTIAL HYPERTENSION: ICD-10-CM

## 2023-07-24 DIAGNOSIS — I25.5 ISCHEMIC CARDIOMYOPATHY: ICD-10-CM

## 2023-07-24 DIAGNOSIS — I25.5 CARDIOMYOPATHY, ISCHEMIC: ICD-10-CM

## 2023-07-24 DIAGNOSIS — Z95.820 STATUS POST ANGIOPLASTY WITH STENT: Primary | ICD-10-CM

## 2023-07-24 PROCEDURE — 93000 ELECTROCARDIOGRAM COMPLETE: CPT | Performed by: INTERNAL MEDICINE

## 2023-07-24 PROCEDURE — 99214 OFFICE O/P EST MOD 30 MIN: CPT | Performed by: INTERNAL MEDICINE

## 2023-07-24 NOTE — PROGRESS NOTES
Encounter Date:07/24/2023  Last seen 6/27/2023      Patient ID: Zia Lott is a 62 y.o. male.    Chief Complaint:  Status post CABG  Ischemic cardiomyopathy  Hypertension  Dyslipidemia    History of Present Illness  Patient recently had CABG and was released home.    Since I have last seen, the patient has been without any chest discomfort ,shortness of breath, palpitations, dizziness or syncope.  Denies having any headache ,abdominal pain ,nausea, vomiting , diarrhea constipation, loss of weight or loss of appetite.  Denies having any excessive bruising ,hematuria or blood in the stool.    Review of all systems negative except as indicated.    Reviewed ROS.    Assessment and Plan       ////////////////////  History  ==========  -Status post CABG x 2 with a LIMA to the mid LAD and reverse vein graft to the ramus intermedius on pump off cross-clamp  Dr. Monge-6/22/2023     -History of myocardial infarction in [facility] 2/18/2023  Cardiac cath 2/20/2023 (details not available)  Patient apparently was told he has complex disease and not comfortable in performing intervention in the [facility].      - status post stent to LAD 05/30/2015 and 06/20/2013.     Status post subendocardial myocardial infarction 05/30/2015 prior to stent placement.     - Ischemic cardiomyopathy.-Ejection fraction 15%.-3/13/2023     Echocardiogram 3/13/2023 revealed  Structurally and functionally normal cardiac valves.  Significant left ventricle enlargement with severe and diffuse hypocontractility with ejection fraction of 15%.     Cardiac cath 6/2/2023   Mild pulmonary hypertension.  Left ventricle is significantly enlarged with severe and diffuse hypocontractility with ejection fraction of 20 to 25%.  No mitral regurgitation is seen.     Left main coronary artery has distal 70 to 75% disease (IVUS)  Left anterior descending artery stent is patent.  Ostial left anterior sending artery has calcific 80% disease (0.65 by IFR)  Diagonal branch  is a small caliber vessel that has ostial 70% disease.  Circumflex coronary artery has ostial 90 to 95% disease and 90% disease distal to the second marginal branch.  (IFR 0.68)  First marginal branch has ostial 70% disease.  Second marginal branch is totally occluded at the origin and distal vessel is filling faintly from right circulation.  Right coronary artery has significant anterior origin and did not have any significant disease.     The films were reviewed with Dr. Dc interventionalists.  IFR and IVUS was performed (please see above).  Patient has significant left ventricular dysfunction.  Patient was thought to have coronary artery disease that is not easily amenable for intervention and potentially could compromise circumflex if an attempt is made to stent the left main and left anterior descending artery.  We will reinitiate discussion with cardiovascular surgery Dr. Monge regarding surgical intervention.  However significant left ventricular dysfunction is of concern.     Cardiac catheterization 2015 revealed -  Previously placed LAD stent was patent.  Distal LAD has 50% disease.  Chronic and total occlusion of 1st marginal branch.  Distal marginal branch was filling from RCA.  Circumflex coronary artery has 90-95%  distal to the origin of marginal branch.  RCA is a large and dominant vessel that has diffuse 50% disease.     Echocardiogram 09/11/2018 revealed severe left ventricular dysfunction with ejection fraction of 25%.  Moderate mitral and tricuspid regurgitation is present.     Stress Cardiolite test 09/11/2018 revealed significant anterior apical inferior and poster lateral infarction and ischemia.       -status post anterior wall myocardial infarction 06/2013 and stent placement to LAD 06/2013.       -ischemic cardiomyopathy.  Recent left ventricular ejection fraction was 25-30%.       -smoker -patient was advised to abstain from smoking.        -chronic left bundle-branch block       -hypertension and dyslipidemia      -history of drinking alcohol     -no known allergies  ===========   Plan  =============  Status post CABG x 2 with a LIMA to the mid LAD and reverse vein graft to the ramus intermedius on pump off cross-clamp  Dr. Monge-6/22/2023     Ischemic cardiomyopathy  Hopefully left ventricle function would improve with recent revascularization and GDMT.      Rhythm-sinus.  EKG 7/24/2023 revealed sinus rhythm nonspecific intraventricular conduction delay normal axis no ectopy no significant change from 6/24/2023    Dyslipidemia-on atorvastatin  Discontinue Zetia.    Medications were reviewed and updated.  Patient is on aspirin atorvastatin amiodarone 200 mg twice daily Lasix 40 mg a day carphenazine metoprolol tartrate 12.5 mg twice daily pantoprazole.  Spironolactone 25 mg a day and low-dose lisinopril at 2.5 mg a day.    Reduce amiodarone to 200 mg once a day.  Consider discontinuation of amiodarone at next visit.    Consider ICD if left ventricular function does not improve in the future.     Follow-up in the office in 6 weeks with an echocardiogram.    Likely return to work 9/22/2023 depending on patient's progress.     Further plan will depend on patient's progress.  ]]]]]]]]]]]]]]]]]]]]]         Diagnosis Plan   1. Status post angioplasty with stent        2. Ischemic cardiomyopathy        3. LBBB (left bundle branch block)        4. Dyslipidemia        5. Essential hypertension        6. Cardiomyopathy, ischemic        7. Hx of CABG        LAB RESULTS (LAST 7 DAYS)    CBC        BMP        CMP         BNP        TROPONIN        CoAg        Creatinine Clearance  Estimated Creatinine Clearance: 111.5 mL/min (A) (by C-G formula based on SCr of 0.73 mg/dL (L)).    ABG        Radiology  No radiology results for the last day                The following portions of the patient's history were reviewed and updated as appropriate: allergies, current medications, past family history, past  medical history, past social history, past surgical history, and problem list.    Review of Systems   Constitutional: Negative for malaise/fatigue.   Cardiovascular:  Negative for chest pain, dyspnea on exertion, leg swelling and palpitations.   Respiratory:  Negative for cough and shortness of breath.    Gastrointestinal:  Negative for abdominal pain, nausea and vomiting.   Neurological:  Negative for dizziness, focal weakness, headaches, light-headedness and numbness.   All other systems reviewed and are negative.      Current Outpatient Medications:     amiodarone (PACERONE) 200 MG tablet, 1 tab PO BID x 1 week then 1 tab PO daily until follow-up with cardiology., Disp: 42 tablet, Rfl: 0    aspirin 81 MG EC tablet, Take 1 tablet by mouth Daily., Disp: , Rfl:     atorvastatin (LIPITOR) 80 MG tablet, Take 1 tablet by mouth once daily, Disp: 90 tablet, Rfl: 0    clopidogrel (PLAVIX) 75 MG tablet, Take 1 tablet by mouth once daily, Disp: 90 tablet, Rfl: 0    furosemide (LASIX) 40 MG tablet, Take 1 tablet by mouth Daily for 30 days., Disp: 30 tablet, Rfl: 0    lisinopril (PRINIVIL,ZESTRIL) 5 MG tablet, Take 1 tablet by mouth once daily, Disp: 90 tablet, Rfl: 0    metoprolol succinate XL (TOPROL-XL) 50 MG 24 hr tablet, Take 1 tablet by mouth once daily, Disp: 90 tablet, Rfl: 0    nitroglycerin (NITROSTAT) 0.4 MG SL tablet, Place 1 tablet under the tongue Every 5 (Five) Minutes As Needed for Chest Pain. Take no more than 3 doses in 15 minutes., Disp: , Rfl:     spironolactone (ALDACTONE) 25 MG tablet, Take 1 tablet by mouth Daily., Disp: 30 tablet, Rfl: 2    Vitamin D, Cholecalciferol, (CHOLECALCIFEROL) 10 MCG (400 UNIT) tablet, Take 1 tablet by mouth Daily., Disp: , Rfl:     ezetimibe (ZETIA) 10 MG tablet, Take 1 tablet by mouth once daily (Patient not taking: Reported on 7/24/2023), Disp: 90 tablet, Rfl: 0  No current facility-administered medications for this visit.    Facility-Administered Medications Ordered in  Other Visits:     Chlorhexidine Gluconate Cloth 2 % pads 1 application, 1 application , Topical, Q12H PRN, Virgie Mejia APRN    Allergies   Allergen Reactions    Adhesive Tape Rash       Family History   Problem Relation Age of Onset    Diabetes Father     Heart failure Father        Past Surgical History:   Procedure Laterality Date    CARDIAC CATHETERIZATION  05/30/2015    CARDIAC CATHETERIZATION N/A 6/2/2023    Procedure: Left Heart Cath with Coronary Angiography;  Surgeon: Judson Baumann MD;  Location: [facility] CATH INVASIVE LOCATION;  Service: Cardiovascular;  Laterality: N/A;    CARDIAC CATHETERIZATION N/A 6/2/2023    Procedure: Right Heart Cath;  Surgeon: Judson Baumann MD;  Location: [facility] CATH INVASIVE LOCATION;  Service: Cardiovascular;  Laterality: N/A;    CARDIAC CATHETERIZATION  6/2/2023    Procedure: Functional Flow [place];  Surgeon: Yousif Dc MD;  Location: [facility] CATH INVASIVE LOCATION;  Service: Cardiovascular;;    COLONOSCOPY      CORONARY ANGIOPLASTY WITH STENT PLACEMENT  05/30/2015    stent lad and circumflex distal to the marginal branch    CORONARY ANGIOPLASTY WITH STENT PLACEMENT  06/2013    stent to lad    CORONARY ARTERY BYPASS GRAFT N/A 6/22/2023    Procedure: CORONARY ARTERY BYPASS GRAFTING, POSSIBLE IMPELLA PLACEMENT;  Surgeon: Pedro Monge MD;  Location: [facility] CVOR;  Service: Cardiothoracic;  Laterality: N/A;  CABG X 2 (1 vein graft, 1 JUDD graft), Off pump with perfusion assist.    INTERVENTIONAL RADIOLOGY PROCEDURE N/A 6/2/2023    Procedure: Intravascular Ultrasound;  Surgeon: Yousif Dc MD;  Location: [facility] CATH INVASIVE LOCATION;  Service: Cardiovascular;  Laterality: N/A;    TRANSESOPHAGEAL ECHOCARDIOGRAM (YESENIA) N/A 6/22/2023    Procedure: TRANSESOPHAGEAL ECHOCARDIOGRAM WITH ANESTHESIA;  Surgeon: Pedro Monge MD;  Location: [facility];  Service: Cardiothoracic;  Laterality: N/A;       Past Medical History:   Diagnosis Date    BBB (bundle branch block)      "lt bundle branch block    CAD (coronary artery disease)     Cardiomyopathy     CHD (coronary heart disease)     Congenital heart disease     Heart murmur     Hyperlipidemia     Hypertension     Myocardial infarction 2023    acute    Sleep apnea        Family History   Problem Relation Age of Onset    Diabetes Father     Heart failure Father        Social History     Socioeconomic History    Marital status: Significant Other   Tobacco Use    Smoking status: Former     Packs/day: 0.00     Years: 15.00     Pack years: 0.00     Types: Cigarettes     Quit date: 2023     Years since quittin.2     Passive exposure: Current    Smokeless tobacco: Never   Vaping Use    Vaping Use: Never used   Substance and Sexual Activity    Alcohol use: Not Currently    Drug use: Not Currently    Sexual activity: Defer           ECG 12 Lead    Date/Time: 2023 3:35 PM  Performed by: Judson Baumann MD  Authorized by: Judson Baumann MD   Comparison: compared with previous ECG   Similar to previous ECG  Comparison to previous ECG: Normal sinus rhythm nonspecific ST-T wave changes nonspecific intraventricular conduction delay 67/min no ectopy.  Compared to 2023 intraventricular conduction delay is less.        Objective:       Physical Exam    /69 (BP Location: Left arm, Patient Position: Sitting, Cuff Size: Adult)   Pulse 67   Ht 177.8 cm (70\")   Wt 77.1 kg (170 lb)   SpO2 97%   BMI 24.39 kg/m²   The patient is alert, oriented and in no distress.    Vital signs as noted above.    Head and neck revealed no carotid bruits or jugular venous distension.  No thyromegaly or lymphadenopathy is present.    Lungs clear.  No wheezing.  Breath sounds are normal bilaterally.    Heart normal first and second heart sounds.  No murmur..  No pericardial rub is present.  No gallop is present.    Abdomen soft and nontender.  No organomegaly is present.    Extremities revealed good peripheral pulses without any pedal " edema.  Right leg incision is healing.  Has subcutaneous hematoma.  No sign of infection.    Skin warm and dry.    Musculoskeletal system is grossly normal.    CNS grossly normal.    Reviewed and updated.

## 2023-07-25 ENCOUNTER — TELEPHONE (OUTPATIENT)
Dept: FAMILY MEDICINE CLINIC | Facility: CLINIC | Age: 63
End: 2023-07-25
Payer: COMMERCIAL

## 2023-07-25 NOTE — TELEPHONE ENCOUNTER
"   Hub staff attempted to follow warm transfer process and was unsuccessful     Caller: Zia Lott \"Ric\"    Relationship to patient: Self    Best call back number:     Zia Lott \"Ric\" (Self) [phone] (Mobile)         PATIENT WANTED TO SPEAK WITH AQUILINO AGAIN REGARDING THE MEDICATIONS HE IS ON     POSSIBLY ABOUT THE LISINOPRIL AND SPIRONOLACTONE       Patient is needing:   "

## 2023-07-31 ENCOUNTER — TELEPHONE (OUTPATIENT)
Dept: CARDIOLOGY | Facility: CLINIC | Age: 63
End: 2023-07-31

## 2023-07-31 ENCOUNTER — PATIENT MESSAGE (OUTPATIENT)
Dept: CARDIOLOGY | Facility: CLINIC | Age: 63
End: 2023-07-31
Payer: COMMERCIAL

## 2023-08-01 ENCOUNTER — TRANSCRIBE ORDERS (OUTPATIENT)
Dept: CARDIAC REHAB | Facility: HOSPITAL | Age: 63
End: 2023-08-01
Payer: COMMERCIAL

## 2023-08-01 DIAGNOSIS — F17.210 CIGARETTE SMOKER: ICD-10-CM

## 2023-08-01 DIAGNOSIS — Z95.1 S/P CABG (CORONARY ARTERY BYPASS GRAFT): Primary | ICD-10-CM

## 2023-08-01 DIAGNOSIS — I10 PRIMARY HYPERTENSION: ICD-10-CM

## 2023-08-01 DIAGNOSIS — Z12.5 SPECIAL SCREENING FOR MALIGNANT NEOPLASM OF PROSTATE: ICD-10-CM

## 2023-08-01 DIAGNOSIS — I25.10 CHRONIC CORONARY ARTERY DISEASE: ICD-10-CM

## 2023-08-01 DIAGNOSIS — Z56.6 STRESS AT WORK: ICD-10-CM

## 2023-08-01 DIAGNOSIS — Z98.61 STATUS POST PERCUTANEOUS TRANSLUMINAL CORONARY ANGIOPLASTY: ICD-10-CM

## 2023-08-01 DIAGNOSIS — E78.2 MIXED HYPERLIPIDEMIA: ICD-10-CM

## 2023-08-01 RX ORDER — CLOPIDOGREL BISULFATE 75 MG/1
TABLET ORAL
Qty: 90 TABLET | Refills: 0 | Status: SHIPPED | OUTPATIENT
Start: 2023-08-01

## 2023-08-01 SDOH — HEALTH STABILITY - MENTAL HEALTH: OTHER PHYSICAL AND MENTAL STRAIN RELATED TO WORK: Z56.6

## 2023-08-02 ENCOUNTER — TELEPHONE (OUTPATIENT)
Dept: CARDIAC REHAB | Facility: HOSPITAL | Age: 63
End: 2023-08-02
Payer: COMMERCIAL

## 2023-08-03 ENCOUNTER — OFFICE VISIT (OUTPATIENT)
Dept: FAMILY MEDICINE CLINIC | Facility: CLINIC | Age: 63
End: 2023-08-03
Payer: COMMERCIAL

## 2023-08-03 VITALS
SYSTOLIC BLOOD PRESSURE: 112 MMHG | WEIGHT: 171.2 LBS | DIASTOLIC BLOOD PRESSURE: 70 MMHG | HEART RATE: 79 BPM | OXYGEN SATURATION: 99 % | BODY MASS INDEX: 24.51 KG/M2 | HEIGHT: 70 IN | RESPIRATION RATE: 18 BRPM

## 2023-08-03 DIAGNOSIS — E78.2 MIXED HYPERLIPIDEMIA: ICD-10-CM

## 2023-08-03 DIAGNOSIS — I25.5 CARDIOMYOPATHY, ISCHEMIC: ICD-10-CM

## 2023-08-03 DIAGNOSIS — I10 PRIMARY HYPERTENSION: ICD-10-CM

## 2023-08-03 DIAGNOSIS — Z98.61 STATUS POST PERCUTANEOUS TRANSLUMINAL CORONARY ANGIOPLASTY: ICD-10-CM

## 2023-08-03 DIAGNOSIS — I50.23 ACUTE ON CHRONIC SYSTOLIC HEART FAILURE: ICD-10-CM

## 2023-08-03 DIAGNOSIS — F41.1 GAD (GENERALIZED ANXIETY DISORDER): ICD-10-CM

## 2023-08-03 DIAGNOSIS — Z95.820 STATUS POST ANGIOPLASTY WITH STENT: ICD-10-CM

## 2023-08-03 DIAGNOSIS — Z56.6 STRESS AT WORK: ICD-10-CM

## 2023-08-03 DIAGNOSIS — F17.210 CIGARETTE SMOKER: ICD-10-CM

## 2023-08-03 DIAGNOSIS — Z12.5 SPECIAL SCREENING FOR MALIGNANT NEOPLASM OF PROSTATE: ICD-10-CM

## 2023-08-03 DIAGNOSIS — I25.10 CHRONIC CORONARY ARTERY DISEASE: Primary | ICD-10-CM

## 2023-08-03 RX ORDER — NITROGLYCERIN 0.4 MG/1
0.4 TABLET SUBLINGUAL
Qty: 30 TABLET | Refills: 6 | Status: SHIPPED | OUTPATIENT
Start: 2023-08-03

## 2023-08-03 RX ORDER — METOPROLOL SUCCINATE 25 MG/1
50 TABLET, EXTENDED RELEASE ORAL DAILY
Qty: 90 TABLET | Refills: 2 | Status: SHIPPED | OUTPATIENT
Start: 2023-08-03 | End: 2023-11-01

## 2023-08-03 SDOH — HEALTH STABILITY - MENTAL HEALTH: OTHER PHYSICAL AND MENTAL STRAIN RELATED TO WORK: Z56.6

## 2023-08-03 NOTE — PROGRESS NOTES
"Chief Complaint  Follow-up    Subjective        Zia Lott presents to [facility]  History of Present Illness  Pt here for follow up regarding 5 night stay at [facility] for heart attack    Objective   Vital Signs:  There were no vitals taken for this visit.  Estimated body mass index is 24.39 kg/mý as calculated from the following:    Height as of 7/24/23: 177.8 cm (70\").    Weight as of 7/24/23: 77.1 kg (170 lb).       BMI is within normal parameters. No other follow-up for BMI required.      Physical Exam  Constitutional:       Appearance: Normal appearance. He is well-developed and normal weight.   HENT:      Head: Normocephalic and atraumatic.      Right Ear: Tympanic membrane, ear canal and external ear normal.      Left Ear: Tympanic membrane, ear canal and external ear normal.      Nose: Nose normal.      Mouth/Throat:      Mouth: Mucous membranes are moist.      Pharynx: Oropharynx is clear. No oropharyngeal exudate.   Eyes:      Extraocular Movements: Extraocular movements intact.      Conjunctiva/sclera: Conjunctivae normal.      Pupils: Pupils are equal, round, and reactive to light.   Cardiovascular:      Rate and Rhythm: Normal rate and regular rhythm.      Pulses: Normal pulses.      Heart sounds: Normal heart sounds.   Pulmonary:      Effort: Pulmonary effort is normal.      Breath sounds: Normal breath sounds.   Abdominal:      General: Bowel sounds are normal.      Palpations: Abdomen is soft.   Musculoskeletal:         General: Normal range of motion.      Cervical back: Normal range of motion and neck supple.   Skin:     General: Skin is warm and dry.   Neurological:      General: No focal deficit present.      Mental Status: He is alert and oriented to person, place, and time. Mental status is at baseline.   Psychiatric:         Mood and Affect: Mood normal.         Behavior: Behavior normal.         Thought Content: Thought content normal.         Judgment: " Judgment normal.      Result Review :                   Assessment and Plan   There are no diagnoses linked to this encounter.         Follow Up   No follow-ups on file.  Patient was given instructions and counseling regarding his condition or for health maintenance advice. Please see specific information pulled into the AVS if appropriate.

## 2023-08-03 NOTE — PROGRESS NOTES
Chief Complaint  Follow-up    Subjective        Zia Lott presents to [facility]  History of Present Illness  Pt here for follow up regarding 5 night stay at [facility] for heart attack    The patient presents today for a follow-up. He is accompanied by an adult female.    The patient had a bypass surgery on 06/22/2023 at [place] by Dr. Monge. He has a history of stents placed but no previous bypass surgeries. He had a myocardial infarction when he was in the [facility] and returned to the [place]. He is still treating his cholesterol and is taking atorvastatin. He was told to discontinue the ezetimibe 10 mg once daily, which he last took before the surgery. He has low energy and the adult female reports low stamina. He is not doing yard work, but does basic self care. He denies shortness of breath with exertion, but is generally tired. 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 His job is still physically demanding, but less stressful. The adult female states that he has not returned to work since his myocardial infarction in 02/2023, and he has been on short-term disability that has been extended to 09/22/2023 by Dr. Baumann due to the bypass surgery. The adult female reports that he is supposed to start cardiac rehabilitation on 08/22/2023 recommended by Dr. Baumann. His physical therapist had mentioned work hardening. His cardiology follow up is 09/05/2023, and she states that an echocardiogram will be done. He needs nitroglycerin which was last prescribed in 2015, and the adult female notes that he took it the day he had the myocardial infarction. He believes he will be released to return to work on 09/23/2023.     The patient has blisters on his left lower extremity from the surgical tape placed at the vein harvesting site. The adult female reports that today is 6 weeks post surgery, and he was said to be allergic to the  "surgical tape. He had bruising to the area and confirms that it is . He had to use a walker for 4 weeks due to the pain. He denies ankle swelling.     He is no longer smoking and quit drinking alcohol. He has Chantix but did not use it. He is sleeping well at night. The patient denies having a blood pressure monitor at home.     The adult female states that he was told to take the Pacerone twice daily until he followed up with cardiologist, Dr. Baumann, and he discontinued it 2 weeks ago. She adds that he takes aspirin 81 mg in the morning and atorvastatin 80 mg once daily. She reports that he discontinued the furosemide because he had been directed to take it for 30 days. She states that he stopped the Mucinex that he was taking for congestion, and he was prescribed hydrocodone-acetaminophen for post surgery pain. He has been taking lisinopril 5 mg once daily. She states that his metoprolol tartrate was changed from the succinate, and she believes the patient will take the medication once daily. The adult female states that he takes spironolactone 25 mg once daily in the morning and vitamin D 400 units every other day.     Objective   Vital Signs:  /70 (BP Location: Left arm, Patient Position: Sitting, Cuff Size: Adult)   Pulse 79   Resp 18   Ht 177.8 cm (70\")   Wt 77.7 kg (171 lb 3.2 oz)   SpO2 99%   BMI 24.56 kg/mý   Estimated body mass index is 24.56 kg/mý as calculated from the following:    Height as of this encounter: 177.8 cm (70\").    Weight as of this encounter: 77.7 kg (171 lb 3.2 oz).       BMI is within normal parameters. No other follow-up for BMI required.      Physical Exam  Constitutional:       Appearance: Normal appearance. He is well-developed and normal weight.   HENT:      Head: Normocephalic and atraumatic.      Right Ear: Tympanic membrane, ear canal and external ear normal.      Left Ear: Tympanic membrane, ear canal and external ear normal.      Nose: Nose normal.      " Mouth/Throat:      Mouth: Mucous membranes are moist.      Pharynx: Oropharynx is clear. No oropharyngeal exudate.   Eyes:      Extraocular Movements: Extraocular movements intact.      Conjunctiva/sclera: Conjunctivae normal.      Pupils: Pupils are equal, round, and reactive to light.   Cardiovascular:      Rate and Rhythm: Normal rate and regular rhythm.      Pulses: Normal pulses.      Heart sounds: Normal heart sounds.   Pulmonary:      Effort: Pulmonary effort is normal.      Breath sounds: Normal breath sounds.   Abdominal:      General: Bowel sounds are normal.      Palpations: Abdomen is soft.   Musculoskeletal:         General: Normal range of motion.      Cervical back: Normal range of motion and neck supple.   Skin:     General: Skin is warm and dry.   Neurological:      General: No focal deficit present.      Mental Status: He is alert and oriented to person, place, and time. Mental status is at baseline.   Psychiatric:         Mood and Affect: Mood normal.         Behavior: Behavior normal.         Thought Content: Thought content normal.         Judgment: Judgment normal.      Result Review :                   Assessment and Plan   1. Chronic coronary artery disease  - He is a 62-year-old white male who has known coronary artery disease and has had several acute MIs in the past. Several years ago, he had an acute MI that resulted in him having a stent placed. Earlier this year, he was on a vacation to the [facility] and had chest pain again, and [place] He was transported by air ambulance to the [facility], and eventually came back to the [facility] where he underwent coronary artery bypass grafting x1. He has done well postoperatively. He still has a right lower extremity vein harvest site that is trying to heal, but otherwise his chest wound is healed. His chest is clear and his abdomen is soft. He is not having any chest pain. He has been on disability for the  past 6 months, and he is considering going back to work later next month. We will see how he does or whether or not he can go back part time.    2. Cardiomyopathy, ischemic  - The patient is very short of breath with exertion. At rest and just with simple walking around the house, he does well, but with any physical exertion, he gets very short of breath. The patient will be seeing his cardiologist back soon. and he will help us make a decision about him going back to work, but I really think he could probably go back part-time. The shortness of breath is probably an ischemic cardiomyopathy.    3. Status post angioplasty with stent  - This was several years ago.    4. Hyperlipidemia  - The patient is on atorvastatin and we are going to add his Zetia back in. I am going to recheck his lipid panel in 12/2023.    5. Hypertension  - The patient's blood pressure was excellent today. He will continue current medicines. I am going to have him stay on the metoprolol succinate 25 mg daily.    6. Acute on chronic systolic heart failure based on probable ischemic cardiomyopathy.    7. Special screening for PSA for prostate cancer  - This will be done in 12/2023.    8. Generalized anxiety  - He has always had an element of anxiety. He really is not taking anything right now to deal with that, and he feels pretty good about not using anything right now. I am all in favor of that as long as he keeps the stress under good control.    9. Stress at work  - The patient has not been working, so obviously no stress there. He has been off 6 months. I am encouraged him to consider going back at least part-time.    10. Status post percutaneous coronary angioplasty  - The patient had that done several years ago and has done well with that, especially after the stent was placed.    11. Cigarette smoker  - He used to be a cigarette smoker. He has quit smoking and is currently not drinking.       Follow Up   Return in about 6 months (around  2/3/2024), or if symptoms worsen or fail to improve, for Recheck- labs.       , Annual physical-- one year.  Patient was given instructions and counseling regarding his condition or for health maintenance advice. Please see specific information pulled into the AVS if appropriate.       Transcribed from ambient dictation for Bryan Beckham MD by Mary Lopez.  08/03/23   14:46 EDT    Patient or patient representative verbalized consent to the visit recording.  I have personally performed the services described in this document as transcribed by the above individual, and it is both accurate and complete.  Bryan Beckham MD  8/20/2023  09:42 EDT

## 2023-08-16 ENCOUNTER — TELEPHONE (OUTPATIENT)
Dept: CARDIOLOGY | Facility: CLINIC | Age: 63
End: 2023-08-16
Payer: COMMERCIAL

## 2023-08-16 NOTE — TELEPHONE ENCOUNTER
Called and left VM with , Dasia Reyna. Would like her to call office back to go over her questions.

## 2023-08-17 NOTE — PROGRESS NOTES
Encounter Date:09/05/2023  Last seen 7/24/2023      Patient ID: Zia Lott is a 62 y.o. male.    Chief Complaint:    Status post CABG  Ischemic cardiomyopathy  Hypertension  Dyslipidemia     History of Present Illness  Since I have last seen, the patient has been without any chest discomfort ,shortness of breath, palpitations, dizziness or syncope.  Denies having any headache ,abdominal pain ,nausea, vomiting , diarrhea constipation, loss of weight or loss of appetite.  Denies having any excessive bruising ,hematuria or blood in the stool.    Review of all systems negative except as indicated.    Reviewed ROS.    Assessment and Plan         ////////////////////  History  ==========  -Status post CABG x 2 with a LIMA to the mid LAD and reverse vein graft to the ramus intermedius on pump off cross-clamp  Dr. Monge-6/22/2023     -History of myocardial infarction in [facility] 2/18/2023  Cardiac cath 2/20/2023 (details not available)  Patient apparently was told he has complex disease and not comfortable in performing intervention in the [facility].      - status post stent to LAD 05/30/2015 and 06/20/2013.     Status post subendocardial myocardial infarction 05/30/2015 prior to stent placement.     - Ischemic cardiomyopathy.-Ejection fraction 15%.-3/13/2023    Echocardiogram 9/5/2023   Structurally and functionally normal cardiac valves except for mild mitral regurgitation..  Left ventricle is enlarged with severe and diffuse hypocontractility with ejection fraction of 20 to 25%.    Echocardiogram 3/13/2023 revealed  Structurally and functionally normal cardiac valves.  Significant left ventricle enlargement with severe and diffuse hypocontractility with ejection fraction of 15%.     Cardiac cath 6/2/2023   Mild pulmonary hypertension.  Left ventricle is significantly enlarged with severe and diffuse hypocontractility with ejection fraction of 20 to 25%.  No mitral regurgitation is seen.     Left main coronary artery  has distal 70 to 75% disease (IVUS)  Left anterior descending artery stent is patent.  Ostial left anterior sending artery has calcific 80% disease (0.65 by IFR)  Diagonal branch is a small caliber vessel that has ostial 70% disease.  Circumflex coronary artery has ostial 90 to 95% disease and 90% disease distal to the second marginal branch.  (IFR 0.68)  First marginal branch has ostial 70% disease.  Second marginal branch is totally occluded at the origin and distal vessel is filling faintly from right circulation.  Right coronary artery has significant anterior origin and did not have any significant disease.     The films were reviewed with Dr. Dc interventionalists.  IFR and IVUS was performed (please see above).  Patient has significant left ventricular dysfunction.  Patient was thought to have coronary artery disease that is not easily amenable for intervention and potentially could compromise circumflex if an attempt is made to stent the left main and left anterior descending artery.  We will reinitiate discussion with cardiovascular surgery Dr. Monge regarding surgical intervention.  However significant left ventricular dysfunction is of concern.     Cardiac catheterization 2015 revealed -  Previously placed LAD stent was patent.  Distal LAD has 50% disease.  Chronic and total occlusion of 1st marginal branch.  Distal marginal branch was filling from RCA.  Circumflex coronary artery has 90-95%  distal to the origin of marginal branch.  RCA is a large and dominant vessel that has diffuse 50% disease.     Echocardiogram 09/11/2018 revealed severe left ventricular dysfunction with ejection fraction of 25%.  Moderate mitral and tricuspid regurgitation is present.     Stress Cardiolite test 09/11/2018 revealed significant anterior apical inferior and poster lateral infarction and ischemia.       -status post anterior wall myocardial infarction 06/2013 and stent placement to LAD 06/2013.       -ischemic  cardiomyopathy.  Recent left ventricular ejection fraction was 25-30%.       -smoker -patient was advised to abstain from smoking.        -chronic left bundle-branch block      -hypertension and dyslipidemia      -history of drinking alcohol     -no known allergies  ===========   Plan  =============  Status post CABG x 2 with a LIMA to the mid LAD and reverse vein graft to the ramus intermedius on pump off cross-clamp  Dr. Monge-6/22/2023     Ischemic cardiomyopathy  Patient is on GDMT.    Echocardiogram 9/5/2023 revealed  Structurally and functionally normal cardiac valves except for mild mitral regurgitation..  Left ventricle is enlarged with severe and diffuse hypocontractility with ejection fraction of 20 to 25%.    Rhythm-sinus.  EKG 7/24/2023 revealed sinus rhythm nonspecific intraventricular conduction delay normal axis no ectopy no significant change from 6/24/2023     Dyslipidemia-on atorvastatin  Discontinue Zetia.     Medications were reviewed and updated.  Patient is on aspirin atorvastatin amiodarone 200 mg twice daily Lasix 40 mg a day carphenazine metoprolol tartrate 12.5 mg twice daily pantoprazole.  Spironolactone 25 mg a day and low-dose lisinopril at 2.5 mg a day.     Reduce amiodarone to 200 mg once a day.  Consider discontinuation of amiodarone at next visit.     Patient has persistent left ventricular dysfunction.  Patient has underlying left bundle branch block.  Have discussed at length with patient and wife regarding biventricular ICD.  Risks and benefits pros and cons of the procedure were discussed with patient.  Patient is undecided at this time.  Patient has an appointment to see Dr. Zamora tomorrow 9/6/2023.  Have discussed with Dr. Zamora for coordination of care.    Patient would like to return to work.  Patient may be able to return to work according to his supervisor for light duty work.  If patient decides not to have the device placed patient still can be returned to  work with increased risk of cardiac dysrhythmia etc.  This was explained to patient and patient's wife at length.  They understand it.    Further plan will depend on patient's progress.    Reviewed and updated 9/5/2023  ]]]]]]]]]]]]]]]]]]]]]              Diagnosis Plan   1. Hx of CABG        2. Status post angioplasty with stent        3. Ischemic cardiomyopathy        4. LBBB (left bundle branch block)        5. Dyslipidemia        6. Essential hypertension        7. Cardiomyopathy, ischemic        LAB RESULTS (LAST 7 DAYS)    CBC        BMP        CMP         BNP        TROPONIN        CoAg        Creatinine Clearance  CrCl cannot be calculated (Patient's most recent lab result is older than the maximum 30 days allowed.).    ABG        Radiology  No radiology results for the last day                The following portions of the patient's history were reviewed and updated as appropriate: allergies, current medications, past family history, past medical history, past social history, past surgical history, and problem list.    Review of Systems   Constitutional: Negative for malaise/fatigue.   Cardiovascular:  Negative for chest pain, leg swelling, palpitations and syncope.   Respiratory:  Negative for shortness of breath.    Skin:  Negative for rash.   Gastrointestinal:  Negative for nausea and vomiting.   Neurological:  Negative for dizziness, light-headedness and numbness.   All other systems reviewed and are negative.      Current Outpatient Medications:     aspirin 81 MG EC tablet, Take 1 tablet by mouth Daily., Disp: , Rfl:     atorvastatin (LIPITOR) 80 MG tablet, Take 1 tablet by mouth once daily, Disp: 90 tablet, Rfl: 0    clopidogrel (PLAVIX) 75 MG tablet, Take 1 tablet by mouth once daily, Disp: 90 tablet, Rfl: 0    ezetimibe (ZETIA) 10 MG tablet, Take 1 tablet by mouth once daily, Disp: 90 tablet, Rfl: 0    lisinopril (PRINIVIL,ZESTRIL) 5 MG tablet, Take 1 tablet by mouth once daily, Disp: 90 tablet, Rfl:  0    metoprolol succinate XL (TOPROL-XL) 25 MG 24 hr tablet, Take 2 tablets by mouth Daily for 90 days., Disp: 90 tablet, Rfl: 2    nitroglycerin (Nitrostat) 0.4 MG SL tablet, Place 1 tablet under the tongue Every 5 (Five) Minutes As Needed for Chest Pain. Take no more than 3 doses in 15 minutes., Disp: 30 tablet, Rfl: 6    spironolactone (ALDACTONE) 25 MG tablet, Take 1 tablet by mouth Daily., Disp: 30 tablet, Rfl: 2    Vitamin D, Cholecalciferol, (CHOLECALCIFEROL) 10 MCG (400 UNIT) tablet, Take 1 tablet by mouth Daily., Disp: , Rfl:     furosemide (LASIX) 40 MG tablet, Take 1 tablet by mouth Daily for 30 days. (Patient not taking: Reported on 8/3/2023), Disp: 30 tablet, Rfl: 0  No current facility-administered medications for this visit.    Facility-Administered Medications Ordered in Other Visits:     Chlorhexidine Gluconate Cloth 2 % pads 1 application, 1 application , Topical, Q12H PRN, Virgie Mejia APRN    Allergies   Allergen Reactions    Adhesive Tape Rash       Family History   Problem Relation Age of Onset    Diabetes Father     Heart failure Father        Past Surgical History:   Procedure Laterality Date    CARDIAC CATHETERIZATION  05/30/2015    CARDIAC CATHETERIZATION N/A 6/2/2023    Procedure: Left Heart Cath with Coronary Angiography;  Surgeon: Judson Baumann MD;  Location: [facility] CATH INVASIVE LOCATION;  Service: Cardiovascular;  Laterality: N/A;    CARDIAC CATHETERIZATION N/A 6/2/2023    Procedure: Right Heart Cath;  Surgeon: Judson Bamuann MD;  Location: [facility] CATH INVASIVE LOCATION;  Service: Cardiovascular;  Laterality: N/A;    CARDIAC CATHETERIZATION  6/2/2023    Procedure: Functional Flow [place];  Surgeon: Yousif Dc MD;  Location: [facility] CATH INVASIVE LOCATION;  Service: Cardiovascular;;    COLONOSCOPY      CORONARY ANGIOPLASTY WITH STENT PLACEMENT  05/30/2015    stent lad and circumflex distal to the marginal branch    CORONARY ANGIOPLASTY WITH STENT PLACEMENT  06/2013    stent  to lad    CORONARY ARTERY BYPASS GRAFT N/A 2023    Procedure: CORONARY ARTERY BYPASS GRAFTING, POSSIBLE IMPELLA PLACEMENT;  Surgeon: Pedro Monge MD;  Location: [facility] CV[facility];  Service: Cardiothoracic;  Laterality: N/A;  CABG X 2 (1 vein graft, 1 JUDD graft), Off pump with perfusion assist.    INTERVENTIONAL RADIOLOGY PROCEDURE N/A 2023    Procedure: Intravascular Ultrasound;  Surgeon: Yousif Dc MD;  Location: [facility] CATH INVASIVE LOCATION;  Service: Cardiovascular;  Laterality: N/A;    TRANSESOPHAGEAL ECHOCARDIOGRAM (YESENIA) N/A 2023    Procedure: TRANSESOPHAGEAL ECHOCARDIOGRAM WITH ANESTHESIA;  Surgeon: Pedro Monge MD;  Location: [facility];  Service: Cardiothoracic;  Laterality: N/A;       Past Medical History:   Diagnosis Date    BBB (bundle branch block)     lt bundle branch block    CAD (coronary artery disease)     Cardiomyopathy     CHD (coronary heart disease)     Congenital heart disease     Heart murmur     Hyperlipidemia     Hypertension     Myocardial infarction 2023    acute    Sleep apnea        Family History   Problem Relation Age of Onset    Diabetes Father     Heart failure Father        Social History     Socioeconomic History    Marital status: Significant Other   Tobacco Use    Smoking status: Former     Packs/day: 0.00     Years: 15.00     Pack years: 0.00     Types: Cigarettes     Quit date: 2023     Years since quittin.3     Passive exposure: Current    Smokeless tobacco: Never   Vaping Use    Vaping Use: Never used   Substance and Sexual Activity    Alcohol use: Not Currently    Drug use: Not Currently    Sexual activity: Defer         Procedures      Objective:       Physical Exam    /67 (BP Location: Right arm, Patient Position: Sitting, Cuff Size: Adult)   Pulse 71   Wt 78.9 kg (174 lb)   SpO2 99% Comment: RA  BMI 24.97 kg/m²   The patient is alert, oriented and in no distress.    Vital signs as noted above.    Head and neck revealed no  carotid bruits or jugular venous distension.  No thyromegaly or lymphadenopathy is present.    Lungs clear.  No wheezing.  Breath sounds are normal bilaterally.    Heart normal first and second heart sounds.  No murmur..  No pericardial rub is present.  No gallop is present.    Abdomen soft and nontender.  No organomegaly is present.    Extremities revealed good peripheral pulses without any pedal edema.    Skin warm and dry.    Musculoskeletal system is grossly normal.    CNS grossly normal.    Reviewed and updated.

## 2023-08-22 ENCOUNTER — TREATMENT (OUTPATIENT)
Dept: CARDIAC REHAB | Facility: HOSPITAL | Age: 63
End: 2023-08-22
Payer: COMMERCIAL

## 2023-08-22 DIAGNOSIS — Z95.1 S/P CABG (CORONARY ARTERY BYPASS GRAFT): ICD-10-CM

## 2023-08-22 PROCEDURE — 93798 PHYS/QHP OP CAR RHAB W/ECG: CPT

## 2023-08-23 ENCOUNTER — TREATMENT (OUTPATIENT)
Dept: CARDIAC REHAB | Facility: HOSPITAL | Age: 63
End: 2023-08-23
Payer: COMMERCIAL

## 2023-08-23 DIAGNOSIS — Z95.1 S/P CABG (CORONARY ARTERY BYPASS GRAFT): Primary | ICD-10-CM

## 2023-08-23 PROCEDURE — 93798 PHYS/QHP OP CAR RHAB W/ECG: CPT

## 2023-08-24 ENCOUNTER — APPOINTMENT (OUTPATIENT)
Dept: CARDIAC REHAB | Facility: HOSPITAL | Age: 63
End: 2023-08-24
Payer: COMMERCIAL

## 2023-08-24 DIAGNOSIS — Z95.1 S/P CABG (CORONARY ARTERY BYPASS GRAFT): Primary | ICD-10-CM

## 2023-08-24 PROCEDURE — 93798 PHYS/QHP OP CAR RHAB W/ECG: CPT

## 2023-08-28 ENCOUNTER — TREATMENT (OUTPATIENT)
Dept: CARDIAC REHAB | Facility: HOSPITAL | Age: 63
End: 2023-08-28
Payer: COMMERCIAL

## 2023-08-28 DIAGNOSIS — Z95.1 S/P CABG (CORONARY ARTERY BYPASS GRAFT): Primary | ICD-10-CM

## 2023-08-28 PROCEDURE — 93798 PHYS/QHP OP CAR RHAB W/ECG: CPT

## 2023-08-30 ENCOUNTER — TREATMENT (OUTPATIENT)
Dept: CARDIAC REHAB | Facility: HOSPITAL | Age: 63
End: 2023-08-30
Payer: COMMERCIAL

## 2023-08-30 DIAGNOSIS — Z95.1 S/P CABG (CORONARY ARTERY BYPASS GRAFT): Primary | ICD-10-CM

## 2023-08-30 PROCEDURE — 93798 PHYS/QHP OP CAR RHAB W/ECG: CPT

## 2023-08-31 ENCOUNTER — TREATMENT (OUTPATIENT)
Dept: CARDIAC REHAB | Facility: HOSPITAL | Age: 63
End: 2023-08-31
Payer: COMMERCIAL

## 2023-08-31 DIAGNOSIS — Z95.1 S/P CABG (CORONARY ARTERY BYPASS GRAFT): Primary | ICD-10-CM

## 2023-08-31 PROCEDURE — 93798 PHYS/QHP OP CAR RHAB W/ECG: CPT

## 2023-09-04 ENCOUNTER — APPOINTMENT (OUTPATIENT)
Dept: CARDIAC REHAB | Facility: HOSPITAL | Age: 63
End: 2023-09-04
Payer: COMMERCIAL

## 2023-09-05 ENCOUNTER — TREATMENT (OUTPATIENT)
Dept: CARDIAC REHAB | Facility: HOSPITAL | Age: 63
End: 2023-09-05
Payer: COMMERCIAL

## 2023-09-05 ENCOUNTER — OFFICE VISIT (OUTPATIENT)
Dept: CARDIOLOGY | Facility: CLINIC | Age: 63
End: 2023-09-05
Payer: COMMERCIAL

## 2023-09-05 ENCOUNTER — HOSPITAL ENCOUNTER (OUTPATIENT)
Dept: CARDIOLOGY | Facility: HOSPITAL | Age: 63
Discharge: HOME | End: 2023-09-05
Admitting: INTERNAL MEDICINE
Payer: COMMERCIAL

## 2023-09-05 VITALS
OXYGEN SATURATION: 99 % | WEIGHT: 174 LBS | HEART RATE: 71 BPM | DIASTOLIC BLOOD PRESSURE: 67 MMHG | SYSTOLIC BLOOD PRESSURE: 110 MMHG | BODY MASS INDEX: 24.97 KG/M2

## 2023-09-05 VITALS
HEART RATE: 87 BPM | WEIGHT: 174 LBS | HEIGHT: 70 IN | SYSTOLIC BLOOD PRESSURE: 90 MMHG | DIASTOLIC BLOOD PRESSURE: 61 MMHG | BODY MASS INDEX: 24.91 KG/M2

## 2023-09-05 DIAGNOSIS — E78.5 DYSLIPIDEMIA: ICD-10-CM

## 2023-09-05 DIAGNOSIS — Z95.1 HX OF CABG: ICD-10-CM

## 2023-09-05 DIAGNOSIS — Z95.820 STATUS POST ANGIOPLASTY WITH STENT: ICD-10-CM

## 2023-09-05 DIAGNOSIS — I25.5 CARDIOMYOPATHY, ISCHEMIC: ICD-10-CM

## 2023-09-05 DIAGNOSIS — I10 ESSENTIAL HYPERTENSION: ICD-10-CM

## 2023-09-05 DIAGNOSIS — I25.5 ISCHEMIC CARDIOMYOPATHY: ICD-10-CM

## 2023-09-05 DIAGNOSIS — Z95.1 HX OF CABG: Primary | ICD-10-CM

## 2023-09-05 DIAGNOSIS — I44.7 LBBB (LEFT BUNDLE BRANCH BLOCK): ICD-10-CM

## 2023-09-05 DIAGNOSIS — Z95.1 S/P CABG (CORONARY ARTERY BYPASS GRAFT): Primary | ICD-10-CM

## 2023-09-05 LAB
BH CV ECHO MEAS - ACS: 2.05 CM
BH CV ECHO MEAS - AO MAX PG: 6.2 MMHG
BH CV ECHO MEAS - AO MEAN PG: 3.7 MMHG
BH CV ECHO MEAS - AO ROOT DIAM: 3.2 CM
BH CV ECHO MEAS - AO V2 MAX: 124.1 CM/SEC
BH CV ECHO MEAS - AO V2 VTI: 21.5 CM
BH CV ECHO MEAS - AVA(I,D): 0.95 CM2
BH CV ECHO MEAS - EDV(CUBED): 261.6 ML
BH CV ECHO MEAS - EDV(MOD-SP4): 126 ML
BH CV ECHO MEAS - EF(MOD-SP4): 9.4 %
BH CV ECHO MEAS - ESV(CUBED): 153.7 ML
BH CV ECHO MEAS - ESV(MOD-SP4): 114.2 ML
BH CV ECHO MEAS - FS: 16.2 %
BH CV ECHO MEAS - IVS/LVPW: 1.06 CM
BH CV ECHO MEAS - IVSD: 1.01 CM
BH CV ECHO MEAS - LA DIMENSION: 4.1 CM
BH CV ECHO MEAS - LV MASS(C)D: 269.7 GRAMS
BH CV ECHO MEAS - LV MAX PG: 0.57 MMHG
BH CV ECHO MEAS - LV MEAN PG: 0.28 MMHG
BH CV ECHO MEAS - LV V1 MAX: 37.8 CM/SEC
BH CV ECHO MEAS - LV V1 VTI: 6.2 CM
BH CV ECHO MEAS - LVIDD: 6.4 CM
BH CV ECHO MEAS - LVIDS: 5.4 CM
BH CV ECHO MEAS - LVOT AREA: 3.3 CM2
BH CV ECHO MEAS - LVOT DIAM: 2.05 CM
BH CV ECHO MEAS - LVPWD: 0.95 CM
BH CV ECHO MEAS - MV A MAX VEL: 85.4 CM/SEC
BH CV ECHO MEAS - MV DEC SLOPE: 289.3 CM/SEC2
BH CV ECHO MEAS - MV DEC TIME: 0.16 MSEC
BH CV ECHO MEAS - MV E MAX VEL: 45 CM/SEC
BH CV ECHO MEAS - MV E/A: 0.53
BH CV ECHO MEAS - MV MAX PG: 3.8 MMHG
BH CV ECHO MEAS - MV MEAN PG: 1.24 MMHG
BH CV ECHO MEAS - MV V2 VTI: 28 CM
BH CV ECHO MEAS - MVA(VTI): 0.73 CM2
BH CV ECHO MEAS - PA V2 MAX: 121.2 CM/SEC
BH CV ECHO MEAS - RAP SYSTOLE: 3 MMHG
BH CV ECHO MEAS - RV MAX PG: 2.6 MMHG
BH CV ECHO MEAS - RV V1 MAX: 80.1 CM/SEC
BH CV ECHO MEAS - RV V1 VTI: 12.5 CM
BH CV ECHO MEAS - SV(LVOT): 20.6 ML
BH CV ECHO MEAS - SV(MOD-SP4): 11.8 ML

## 2023-09-05 PROCEDURE — 93798 PHYS/QHP OP CAR RHAB W/ECG: CPT

## 2023-09-05 PROCEDURE — 93306 TTE W/DOPPLER COMPLETE: CPT

## 2023-09-05 PROCEDURE — 93306 TTE W/DOPPLER COMPLETE: CPT | Performed by: INTERNAL MEDICINE

## 2023-09-06 ENCOUNTER — PREP FOR SURGERY (OUTPATIENT)
Dept: OTHER | Facility: HOSPITAL | Age: 63
End: 2023-09-06
Payer: COMMERCIAL

## 2023-09-06 ENCOUNTER — OFFICE VISIT (OUTPATIENT)
Dept: CARDIOLOGY | Facility: CLINIC | Age: 63
End: 2023-09-06
Payer: COMMERCIAL

## 2023-09-06 ENCOUNTER — APPOINTMENT (OUTPATIENT)
Dept: CARDIAC REHAB | Facility: HOSPITAL | Age: 63
End: 2023-09-06
Payer: COMMERCIAL

## 2023-09-06 ENCOUNTER — TELEPHONE (OUTPATIENT)
Dept: CARDIOLOGY | Facility: CLINIC | Age: 63
End: 2023-09-06
Payer: COMMERCIAL

## 2023-09-06 VITALS
WEIGHT: 177.25 LBS | HEIGHT: 70 IN | DIASTOLIC BLOOD PRESSURE: 72 MMHG | SYSTOLIC BLOOD PRESSURE: 124 MMHG | BODY MASS INDEX: 25.38 KG/M2 | HEART RATE: 74 BPM | OXYGEN SATURATION: 99 %

## 2023-09-06 DIAGNOSIS — I50.23 ACUTE ON CHRONIC SYSTOLIC HEART FAILURE: ICD-10-CM

## 2023-09-06 DIAGNOSIS — I25.10 CHRONIC CORONARY ARTERY DISEASE: ICD-10-CM

## 2023-09-06 DIAGNOSIS — I44.7 LBBB (LEFT BUNDLE BRANCH BLOCK): ICD-10-CM

## 2023-09-06 DIAGNOSIS — I25.5 CARDIOMYOPATHY, ISCHEMIC: Primary | ICD-10-CM

## 2023-09-06 DIAGNOSIS — I50.22 CHRONIC SYSTOLIC CONGESTIVE HEART FAILURE: Primary | ICD-10-CM

## 2023-09-06 NOTE — TELEPHONE ENCOUNTER
JIM... PATIENT ASKED TO LET KATHY KNOW HE HAS APT TODAY WITH DR. LEIGH AND MAYBE SHOULD WAIT UNTIL AFTER APT TO HAVE DR. MADERA SIGN LA PAPERS.

## 2023-09-06 NOTE — PROGRESS NOTES
HP      Name: Zia Lott ADMIT: (Not on file)   : 1960  PCP: Bryan Beckham MD    MRN: [id number] LOS: 0 days   AGE/SEX: 62 y.o. male  ROOM: Room/bed info not found     Chief Complaint   Patient presents with    Consult       Subjective        History of present illness  Zia Lott is a 62-year-old male patient who has history of coronary artery disease status post CABG on 2023, combined ischemic and nonischemic cardiomyopathy, history of PCI in the past, chronic left bundle branch block, is here today to discuss about possibly scheduling for BiV ICD implantation.    Past Medical History:   Diagnosis Date    BBB (bundle branch block)     lt bundle branch block    CAD (coronary artery disease)     Cardiomyopathy     CHD (coronary heart disease)     Congenital heart disease     Heart murmur     Hyperlipidemia     Hypertension     Myocardial infarction 2023    acute    Sleep apnea      Past Surgical History:   Procedure Laterality Date    CARDIAC CATHETERIZATION  2015    CARDIAC CATHETERIZATION N/A 2023    Procedure: Left Heart Cath with Coronary Angiography;  Surgeon: Judson Baumann MD;  Location: [facility] CATH INVASIVE LOCATION;  Service: Cardiovascular;  Laterality: N/A;    CARDIAC CATHETERIZATION N/A 2023    Procedure: Right Heart Cath;  Surgeon: Judson Baumann MD;  Location: [facility] CATH INVASIVE LOCATION;  Service: Cardiovascular;  Laterality: N/A;    CARDIAC CATHETERIZATION  2023    Procedure: Functional Flow [place];  Surgeon: Yousif Dc MD;  Location: [facility] CATH INVASIVE LOCATION;  Service: Cardiovascular;;    COLONOSCOPY      CORONARY ANGIOPLASTY WITH STENT PLACEMENT  2015    stent lad and circumflex distal to the marginal branch    CORONARY ANGIOPLASTY WITH STENT PLACEMENT  2013    stent to lad    CORONARY ARTERY BYPASS GRAFT N/A 2023    Procedure: CORONARY ARTERY BYPASS GRAFTING, POSSIBLE IMPELLA PLACEMENT;  Surgeon: Mariposa  MD Pedro;  Location: [facility] CVOR;  Service: Cardiothoracic;  Laterality: N/A;  CABG X 2 (1 vein graft, 1 JUDD graft), Off pump with perfusion assist.    INTERVENTIONAL RADIOLOGY PROCEDURE N/A 2023    Procedure: Intravascular Ultrasound;  Surgeon: Yousif Dc MD;  Location: [facility] CATH INVASIVE LOCATION;  Service: Cardiovascular;  Laterality: N/A;    TRANSESOPHAGEAL ECHOCARDIOGRAM (YESENIA) N/A 2023    Procedure: TRANSESOPHAGEAL ECHOCARDIOGRAM WITH ANESTHESIA;  Surgeon: Pedro Monge MD;  Location: [facility] CVOR;  Service: Cardiothoracic;  Laterality: N/A;     Family History   Problem Relation Age of Onset    Diabetes Father     Heart failure Father      Social History     Tobacco Use    Smoking status: Former     Packs/day: 0.00     Years: 15.00     Pack years: 0.00     Types: Cigarettes     Quit date: 2023     Years since quittin.3     Passive exposure: Current    Smokeless tobacco: Never   Vaping Use    Vaping Use: Never used   Substance Use Topics    Alcohol use: Not Currently    Drug use: Not Currently     (Not in a hospital admission)    Allergies:  Adhesive tape    Review of systems    Constitutional: Negative.    Respiratory and cardiovascular: As detailed in HPI section.  Gastrointestinal: Negative for constipation, nausea and vomiting negative for abdominal distention, abdominal pain and diarrhea.   Genitourinary: Negative for difficulty urinating and flank pain.   Musculoskeletal: Negative for arthralgias, joint swelling and myalgias.   Skin: Negative for color change, rash and wound.   Neurological: Negative for dizziness, syncope, weakness and headaches.   Hematological: Negative for adenopathy.   Psychiatric/Behavioral: Negative for confusion.   All other systems reviewed and are negative.    Physical Exam  VITALS REVIEWED    General:      well developed, in no acute distress.    Head:      normocephalic and atraumatic.    Eyes:      PERRL/EOM intact, conjunctiva and sclera clear  with out nystagmus.    Neck:      no masses, thyromegaly,  trachea central with normal respiratory effort and PMI displaced laterally  Lungs:      Clear to auscultation bilaterally  Heart:       Regular rate and rhythm  Msk:      no deformity or scoliosis noted of thoracic or lumbar spine.    Pulses:      pulses normal in all 4 extremities.    Extremities:       No lower extremity edema  Neurologic:      no focal deficits.   alert oriented x3  Skin:      intact without lesions or rashes.    Psych:      alert and cooperative; normal mood and affect; normal attention span and concentration.      Result Review :               Pertinent cardiac workup    EKG 7/24/2023 sinus rhythm with left bundle branch block  Echocardiogram 9/5/2023 ejection fraction 20 to 25%.  Echocardiogram 5/8/2023 ejection fraction less than 20%.        Procedures        Assessment and Plan      Zia Lott is a 62-year-old male patient who has coronary artery disease status post CABG in June 2023, combined ischemic and nonischemic cardiomyopathy with ejection fraction of 25% or less despite revascularization and optimal medical therapy for cardiomyopathy including lisinopril, Toprol and spironolactone.  Patient also has chronic left bundle branch block.  I personally reviewed the echocardiogram and he does have evidence of septal dyssynchrony.  Patient has class II heart failure symptoms which seems to be well compensated at this time.  Patient will benefit from implantation of a biventricular ICD for cardiac resynchronization therapy in an attempt to improve his cardiac function and CHF symptoms, and also to provide primary prevention of sudden cardiac death.  His life expectancy within ICD is greater than 1 year.  I went over the risk and benefits with the patient of having an ICD, using the [place] decision aid, to help the patient make an informed decision to have the procedure done. The patient voiced understanding and  agreed to proceed with defibrillator implantation.     Diagnoses and all orders for this visit:    1. Cardiomyopathy, ischemic (Primary)    2. LBBB (left bundle branch block)  Overview:  Added automatically from request for surgery [id number]      3. Acute on chronic systolic heart failure    4. Chronic coronary artery disease           No follow-ups on file.  Patient was given instructions and counseling regarding his condition or for health maintenance advice. Please see specific information pulled into the AVS if appropriate.

## 2023-09-06 NOTE — LETTER
2023     Judson Baumann MD  [address] [facility] [place] [address]    Patient: Zia Lott   YOB: 1960   Date of Visit: 2023     Dear Judson Baumann MD:       Thank you for referring Zia Lott to me for evaluation. Below are the relevant portions of my assessment and plan of care.    If you have questions, please do not hesitate to call me. I look forward to following Zia along with you.         Sincerely,        Kaushal Zamora MD        CC: No Recipients    Kaushal Zamora MD  23 1533  Incomplete  HP      Name: Zia Lott ADMIT: (Not on file)   : 1960  PCP: Bryan Beckham MD    MRN: [id number] LOS: 0 days   AGE/SEX: 62 y.o. male  ROOM: Room/bed info not found     Chief Complaint   Patient presents with   • Consult       Subjective        History of present illness  Zia Lott is a 62-year-old male patient who has history of coronary artery disease status post CABG on 2023, combined ischemic and nonischemic cardiomyopathy, history of PCI in the past, chronic left bundle branch block, is here today to discuss about possibly scheduling for BiV ICD implantation.    Past Medical History:   Diagnosis Date   • BBB (bundle branch block)     lt bundle branch block   • CAD (coronary artery disease)    • Cardiomyopathy    • CHD (coronary heart disease)    • Congenital heart disease    • Heart murmur    • Hyperlipidemia    • Hypertension    • Myocardial infarction 2023    acute   • Sleep apnea      Past Surgical History:   Procedure Laterality Date   • CARDIAC CATHETERIZATION  2015   • CARDIAC CATHETERIZATION N/A 2023    Procedure: Left Heart Cath with Coronary Angiography;  Surgeon: Judson Baumann MD;  Location:  [facility] CATH INVASIVE LOCATION;  Service: Cardiovascular;  Laterality: N/A;   • CARDIAC CATHETERIZATION N/A 2023    Procedure: Right Heart Cath;  Surgeon: Judson Baumann MD;  Location:   [facility] CATH INVASIVE LOCATION;  Service: Cardiovascular;  Laterality: N/A;   • CARDIAC CATHETERIZATION  2023    Procedure: Functional Flow [place];  Surgeon: Yousif Dc MD;  Location: [facility] CATH INVASIVE LOCATION;  Service: Cardiovascular;;   • COLONOSCOPY     • CORONARY ANGIOPLASTY WITH STENT PLACEMENT  2015    stent lad and circumflex distal to the marginal branch   • CORONARY ANGIOPLASTY WITH STENT PLACEMENT  2013    stent to lad   • CORONARY ARTERY BYPASS GRAFT N/A 2023    Procedure: CORONARY ARTERY BYPASS GRAFTING, POSSIBLE IMPELLA PLACEMENT;  Surgeon: Pedro Monge MD;  Location: [facility];  Service: Cardiothoracic;  Laterality: N/A;  CABG X 2 (1 vein graft, 1 JUDD graft), Off pump with perfusion assist.   • INTERVENTIONAL RADIOLOGY PROCEDURE N/A 2023    Procedure: Intravascular Ultrasound;  Surgeon: Yousif Dc MD;  Location: [facility] CATH INVASIVE LOCATION;  Service: Cardiovascular;  Laterality: N/A;   • TRANSESOPHAGEAL ECHOCARDIOGRAM (YESENIA) N/A 2023    Procedure: TRANSESOPHAGEAL ECHOCARDIOGRAM WITH ANESTHESIA;  Surgeon: Pedro Monge MD;  Location: [facility];  Service: Cardiothoracic;  Laterality: N/A;     Family History   Problem Relation Age of Onset   • Diabetes Father    • Heart failure Father      Social History     Tobacco Use   • Smoking status: Former     Packs/day: 0.00     Years: 15.00     Pack years: 0.00     Types: Cigarettes     Quit date: 2023     Years since quittin.3     Passive exposure: Current   • Smokeless tobacco: Never   Vaping Use   • Vaping Use: Never used   Substance Use Topics   • Alcohol use: Not Currently   • Drug use: Not Currently     (Not in a hospital admission)    Allergies:  Adhesive tape    Review of systems    Constitutional: Negative.    Respiratory and cardiovascular: As detailed in HPI section.  Gastrointestinal: Negative for constipation, nausea and vomiting negative for abdominal distention, abdominal pain and diarrhea.    Genitourinary: Negative for difficulty urinating and flank pain.   Musculoskeletal: Negative for arthralgias, joint swelling and myalgias.   Skin: Negative for color change, rash and wound.   Neurological: Negative for dizziness, syncope, weakness and headaches.   Hematological: Negative for adenopathy.   Psychiatric/Behavioral: Negative for confusion.   All other systems reviewed and are negative.    Physical Exam  VITALS REVIEWED    General:      well developed, in no acute distress.    Head:      normocephalic and atraumatic.    Eyes:      PERRL/EOM intact, conjunctiva and sclera clear with out nystagmus.    Neck:      no masses, thyromegaly,  trachea central with normal respiratory effort and PMI displaced laterally  Lungs:      Clear to auscultation bilaterally  Heart:       Regular rate and rhythm  Msk:      no deformity or scoliosis noted of thoracic or lumbar spine.    Pulses:      pulses normal in all 4 extremities.    Extremities:       No lower extremity edema  Neurologic:      no focal deficits.   alert oriented x3  Skin:      intact without lesions or rashes.    Psych:      alert and cooperative; normal mood and affect; normal attention span and concentration.      Result Review :               Pertinent cardiac workup    EKG 7/24/2023 sinus rhythm with left bundle branch block  Echocardiogram 9/5/2023 ejection fraction 20 to 25%.  Echocardiogram 5/8/2023 ejection fraction less than 20%.        Procedures        Assessment and Plan      Zia Lott is a 62-year-old male patient who has coronary artery disease status post CABG in June 2023, combined ischemic and nonischemic cardiomyopathy with ejection fraction of 25% or less despite revascularization and optimal medical therapy for cardiomyopathy including lisinopril, Toprol and spironolactone.  Patient also has chronic left bundle branch block.  I personally reviewed the echocardiogram and he does have evidence of septal dyssynchrony.  Patient  has class II heart failure symptoms which seems to be well compensated at this time.  Patient will benefit from implantation of a biventricular ICD for cardiac resynchronization therapy in an attempt to improve his cardiac function and CHF symptoms, and also to provide primary prevention of sudden cardiac death.  His life expectancy within ICD is greater than 1 year.  I went over the risk and benefits with the patient of having an ICD, using the [facility] decision aid, to help the patient make an informed decision to have the procedure done. The patient voiced understanding and agreed to proceed with defibrillator implantation.     Diagnoses and all orders for this visit:    1. Cardiomyopathy, ischemic (Primary)    2. LBBB (left bundle branch block)  Overview:  Added automatically from request for surgery [id number]      3. Acute on chronic systolic heart failure    4. Chronic coronary artery disease           No follow-ups on file.  Patient was given instructions and counseling regarding his condition or for health maintenance advice. Please see specific information pulled into the AVS if appropriate.

## 2023-09-07 ENCOUNTER — TREATMENT (OUTPATIENT)
Dept: CARDIAC REHAB | Facility: HOSPITAL | Age: 63
End: 2023-09-07
Payer: COMMERCIAL

## 2023-09-07 DIAGNOSIS — Z95.1 S/P CABG (CORONARY ARTERY BYPASS GRAFT): Primary | ICD-10-CM

## 2023-09-07 PROCEDURE — 93798 PHYS/QHP OP CAR RHAB W/ECG: CPT

## 2023-09-08 NOTE — TELEPHONE ENCOUNTER
Spoke with patient - advised papers were on Dr. Baumann's desk and should have them back by Monday afternoon.   Patient understood

## 2023-09-11 ENCOUNTER — TREATMENT (OUTPATIENT)
Dept: CARDIAC REHAB | Facility: HOSPITAL | Age: 63
End: 2023-09-11
Payer: COMMERCIAL

## 2023-09-11 ENCOUNTER — TELEPHONE (OUTPATIENT)
Dept: CARDIOLOGY | Facility: CLINIC | Age: 63
End: 2023-09-11
Payer: COMMERCIAL

## 2023-09-11 DIAGNOSIS — Z95.1 S/P CABG (CORONARY ARTERY BYPASS GRAFT): Primary | ICD-10-CM

## 2023-09-11 PROCEDURE — 93798 PHYS/QHP OP CAR RHAB W/ECG: CPT

## 2023-09-11 NOTE — TELEPHONE ENCOUNTER
"Caller: Zia Lott \"Ric\"    Relationship: Self    Best call back number: [phone]     What form or medical record are you requesting:     Who is requesting this form or medical record from you: PATIENT    How would you like to receive the form or medical records (pick-up, mail, fax): FAX OR MY CHART  [email or web address]    Timeframe paperwork needed: AS AVAILABLE     Additional notes: PT IS ASKING IF A COPY OF HIS RELEASE COULD BE SENT TO HIM VIA [facility] OR VIA EMAIL SO HE HAS A COPY FOR HIS RECORDS    "

## 2023-09-13 ENCOUNTER — TREATMENT (OUTPATIENT)
Dept: CARDIAC REHAB | Facility: HOSPITAL | Age: 63
End: 2023-09-13
Payer: COMMERCIAL

## 2023-09-13 DIAGNOSIS — Z95.1 S/P CABG (CORONARY ARTERY BYPASS GRAFT): Primary | ICD-10-CM

## 2023-09-13 PROCEDURE — 93798 PHYS/QHP OP CAR RHAB W/ECG: CPT

## 2023-09-14 ENCOUNTER — TREATMENT (OUTPATIENT)
Dept: CARDIAC REHAB | Facility: HOSPITAL | Age: 63
End: 2023-09-14
Payer: COMMERCIAL

## 2023-09-14 DIAGNOSIS — Z95.1 S/P CABG (CORONARY ARTERY BYPASS GRAFT): Primary | ICD-10-CM

## 2023-09-14 PROCEDURE — 93798 PHYS/QHP OP CAR RHAB W/ECG: CPT

## 2023-09-18 ENCOUNTER — TREATMENT (OUTPATIENT)
Dept: CARDIAC REHAB | Facility: HOSPITAL | Age: 63
End: 2023-09-18
Payer: COMMERCIAL

## 2023-09-18 DIAGNOSIS — Z95.1 S/P CABG (CORONARY ARTERY BYPASS GRAFT): Primary | ICD-10-CM

## 2023-09-18 PROCEDURE — 93798 PHYS/QHP OP CAR RHAB W/ECG: CPT

## 2023-09-20 ENCOUNTER — TREATMENT (OUTPATIENT)
Dept: CARDIAC REHAB | Facility: HOSPITAL | Age: 63
End: 2023-09-20
Payer: COMMERCIAL

## 2023-09-20 DIAGNOSIS — Z95.1 S/P CABG (CORONARY ARTERY BYPASS GRAFT): Primary | ICD-10-CM

## 2023-09-20 PROCEDURE — 93798 PHYS/QHP OP CAR RHAB W/ECG: CPT

## 2023-09-21 ENCOUNTER — TREATMENT (OUTPATIENT)
Dept: CARDIAC REHAB | Facility: HOSPITAL | Age: 63
End: 2023-09-21
Payer: COMMERCIAL

## 2023-09-21 DIAGNOSIS — Z95.1 S/P CABG (CORONARY ARTERY BYPASS GRAFT): Primary | ICD-10-CM

## 2023-09-21 PROCEDURE — 93798 PHYS/QHP OP CAR RHAB W/ECG: CPT

## 2023-09-25 ENCOUNTER — TELEPHONE (OUTPATIENT)
Dept: CARDIOLOGY | Facility: CLINIC | Age: 63
End: 2023-09-25
Payer: COMMERCIAL

## 2023-09-25 ENCOUNTER — TREATMENT (OUTPATIENT)
Dept: CARDIAC REHAB | Facility: HOSPITAL | Age: 63
End: 2023-09-25

## 2023-09-25 ENCOUNTER — TELEPHONE (OUTPATIENT)
Dept: FAMILY MEDICINE CLINIC | Facility: CLINIC | Age: 63
End: 2023-09-25

## 2023-09-25 DIAGNOSIS — Z95.1 S/P CABG (CORONARY ARTERY BYPASS GRAFT): Primary | ICD-10-CM

## 2023-09-25 PROCEDURE — 93798 PHYS/QHP OP CAR RHAB W/ECG: CPT

## 2023-09-25 RX ORDER — EZETIMIBE 10 MG/1
TABLET ORAL
Qty: 90 TABLET | Refills: 0 | Status: SHIPPED | OUTPATIENT
Start: 2023-09-25

## 2023-09-25 NOTE — TELEPHONE ENCOUNTER
It looks like Dr. Baumann has been managing his FMLA, he will need to reach out to them for a reduced schedule.

## 2023-09-25 NOTE — TELEPHONE ENCOUNTER
"    Caller: Zia Lott \"Ric\"    Relationship to patient: Self    Best call back number: [phone]    Patient is needing: PATIENT IS REQUESTING A CALL BACK  FROM JEIMY REGARDING HIS [facility] PAPERWORK AND HAVING DR MADERA SIGN OFF ON HIM GOING BACK TO WORK 15 HOURS A WEEK.         "

## 2023-09-25 NOTE — TELEPHONE ENCOUNTER
"  Caller: Zia Lott \"Ric\"    Relationship: Self    Best call back number: [phone]    What was the call regarding: PATIENT REQUESTED RETURN TO WORK ON A PART TIME BASIS OF 15 HOURS PER WEEK.  [employer] INS[employer] COMPANY WILL REACH OUT TO DR. FONSECA.  PLEASE ADVISE    Is it okay if the provider responds through MyChart: YES         "

## 2023-09-27 ENCOUNTER — APPOINTMENT (OUTPATIENT)
Dept: CARDIAC REHAB | Facility: HOSPITAL | Age: 63
End: 2023-09-27
Payer: COMMERCIAL

## 2023-09-28 ENCOUNTER — APPOINTMENT (OUTPATIENT)
Dept: CARDIAC REHAB | Facility: HOSPITAL | Age: 63
End: 2023-09-28
Payer: COMMERCIAL

## 2023-10-10 ENCOUNTER — TELEPHONE (OUTPATIENT)
Dept: CARDIOLOGY | Facility: CLINIC | Age: 63
End: 2023-10-10

## 2023-10-10 ENCOUNTER — TELEPHONE (OUTPATIENT)
Dept: CARDIOLOGY | Facility: CLINIC | Age: 63
End: 2023-10-10
Payer: COMMERCIAL

## 2023-10-10 NOTE — TELEPHONE ENCOUNTER
"  Caller: Zia Lott \"Ric\"    Relationship: Self    Best call back number: [phone]    What is the best time to reach you: ANY    Who are you requesting to speak with (clinical staff, provider,  specific staff member): ANY    What was the call regarding: PATIENT WANTS TO KNOW HOW LONG HE WILL BE OFF WORK AFTER SURGERY.     Is it okay if the provider responds through MyChart: YES        "

## 2023-10-10 NOTE — TELEPHONE ENCOUNTER
"    Hub staff attempted to follow warm transfer process and was unsuccessful     Caller: Zia Lott \"Ric\"    Relationship to patient: Self    Best call back number: [phone]    Patient is needing: PT STATES HE WAS SPEAKING WITH SOMEONE (FORGOT HER NAME) ABOUT HIS PROCEDURE COMING UP AND WAS TOLD TO CALL BACK TO DISCUSS INSURANCE. PLEASE FOLLOW UP BACK.         "

## 2023-10-17 ENCOUNTER — TELEPHONE (OUTPATIENT)
Dept: CARDIAC REHAB | Facility: HOSPITAL | Age: 63
End: 2023-10-17
Payer: COMMERCIAL

## 2023-10-17 ENCOUNTER — TELEPHONE (OUTPATIENT)
Dept: CARDIOLOGY | Facility: CLINIC | Age: 63
End: 2023-10-17

## 2023-10-17 NOTE — TELEPHONE ENCOUNTER
Called patient regarding being away from cardiac rehab since 9/25/2023. Has been having insurance issues and also getting worked  up for pacemaker/AICD 10/27/2023 with Dr. Baumann. Has also gone back to work. Unsure if will be able to return due to these issues. Will keep his chart open for now. ITP update will be sent to Dr. Baumann.

## 2023-10-17 NOTE — TELEPHONE ENCOUNTER
Called patient to let him know that we checked insurance and has been verified eligible at this time.

## 2023-10-17 NOTE — TELEPHONE ENCOUNTER
"  Caller: Zia Lott \"Ric\"    Relationship: Self    Best call back number: [phone]    What is the best time to reach you: ANYTIME AFTER 11 AM    Who are you requesting to speak with (clinical staff, provider,  specific staff member): CLINICAL    Do you know the name of the person who called: YESSY     What was the call regarding: PATIENT IS WANTING TO KNOW HOW LONG HE WILL BE OUT FROM WORK. PLEASE REACH OUT FOR CONSULTATION.    Is it okay if the provider responds through [employer]: YES    "

## 2023-10-17 NOTE — TELEPHONE ENCOUNTER
I spoke with patient, answered his question regarding off from work.  He would like to know if we are in network with his insurance and would like a confirmation call back.

## 2023-10-23 DIAGNOSIS — E78.2 MIXED HYPERLIPIDEMIA: ICD-10-CM

## 2023-10-23 DIAGNOSIS — Z56.6 STRESS AT WORK: ICD-10-CM

## 2023-10-23 DIAGNOSIS — I25.10 CHRONIC CORONARY ARTERY DISEASE: ICD-10-CM

## 2023-10-23 DIAGNOSIS — F17.210 CIGARETTE SMOKER: ICD-10-CM

## 2023-10-23 DIAGNOSIS — Z98.61 STATUS POST PERCUTANEOUS TRANSLUMINAL CORONARY ANGIOPLASTY: ICD-10-CM

## 2023-10-23 DIAGNOSIS — I10 PRIMARY HYPERTENSION: ICD-10-CM

## 2023-10-23 DIAGNOSIS — Z12.5 SPECIAL SCREENING FOR MALIGNANT NEOPLASM OF PROSTATE: ICD-10-CM

## 2023-10-23 RX ORDER — CLOPIDOGREL BISULFATE 75 MG/1
TABLET ORAL
Qty: 90 TABLET | Refills: 0 | Status: SHIPPED | OUTPATIENT
Start: 2023-10-23

## 2023-10-23 SDOH — HEALTH STABILITY - MENTAL HEALTH: OTHER PHYSICAL AND MENTAL STRAIN RELATED TO WORK: Z56.6

## 2023-10-24 ENCOUNTER — TELEPHONE (OUTPATIENT)
Dept: CARDIOLOGY | Facility: CLINIC | Age: 63
End: 2023-10-24
Payer: COMMERCIAL

## 2023-10-24 DIAGNOSIS — E78.2 MIXED HYPERLIPIDEMIA: ICD-10-CM

## 2023-10-24 DIAGNOSIS — I25.10 CHRONIC CORONARY ARTERY DISEASE: ICD-10-CM

## 2023-10-24 DIAGNOSIS — I10 PRIMARY HYPERTENSION: ICD-10-CM

## 2023-10-24 DIAGNOSIS — F17.210 CIGARETTE SMOKER: ICD-10-CM

## 2023-10-24 DIAGNOSIS — Z56.6 STRESS AT WORK: ICD-10-CM

## 2023-10-24 DIAGNOSIS — Z12.5 SPECIAL SCREENING FOR MALIGNANT NEOPLASM OF PROSTATE: ICD-10-CM

## 2023-10-24 DIAGNOSIS — Z98.61 STATUS POST PERCUTANEOUS TRANSLUMINAL CORONARY ANGIOPLASTY: ICD-10-CM

## 2023-10-24 SDOH — HEALTH STABILITY - MENTAL HEALTH: OTHER PHYSICAL AND MENTAL STRAIN RELATED TO WORK: Z56.6

## 2023-10-24 NOTE — TELEPHONE ENCOUNTER
"Caller: Zia Lott \"Ric\"    Relationship: Self    Best call back number: [phone]    What form or medical record are you requesting: [employer] PAPERWORK    Who is requesting this form or medical record from you: WORK    How would you like to receive the form or medical records (pick-up, mail, fax): FAX    Timeframe paperwork needed: ASAP    Additional notes: PT IS CALLING TO SPEAK WITH JEIMY. HE SAID THAT HE HAD SOME MORE [employer] PAPERWORK THAT NEEDED TO BE FAXED TO HIS EMPLOYER.      "

## 2023-10-24 NOTE — TELEPHONE ENCOUNTER
Spoke with patient - on his way to drop off fmla paperwork for upcoming sx with Dr. Talbert.   Advised will figure out which MD needs to fill out and sign and we will let him know.   Understood

## 2023-10-25 ENCOUNTER — LAB (OUTPATIENT)
Dept: LAB | Facility: HOSPITAL | Age: 63
End: 2023-10-25
Payer: COMMERCIAL

## 2023-10-25 DIAGNOSIS — I50.22 CHRONIC SYSTOLIC CONGESTIVE HEART FAILURE: ICD-10-CM

## 2023-10-25 LAB
ALBUMIN SERPL-MCNC: 4 G/DL (ref 3.5–5.2)
ALBUMIN/GLOB SERPL: 1.7 G/DL
ALP SERPL-CCNC: 54 U/L (ref 39–117)
ALT SERPL W P-5'-P-CCNC: 17 U/L (ref 1–41)
ANION GAP SERPL CALCULATED.3IONS-SCNC: 7 MMOL/L (ref 5–15)
AST SERPL-CCNC: 17 U/L (ref 1–40)
BASOPHILS # BLD AUTO: 0.06 10*3/MM3 (ref 0–0.2)
BASOPHILS NFR BLD AUTO: 0.8 % (ref 0–1.5)
BILIRUB SERPL-MCNC: 0.4 MG/DL (ref 0–1.2)
BUN SERPL-MCNC: 10 MG/DL (ref 8–23)
BUN/CREAT SERPL: 10.6 (ref 7–25)
CALCIUM SPEC-SCNC: 9.7 MG/DL (ref 8.6–10.5)
CHLORIDE SERPL-SCNC: 103 MMOL/L (ref 98–107)
CO2 SERPL-SCNC: 28 MMOL/L (ref 22–29)
CREAT SERPL-MCNC: 0.94 MG/DL (ref 0.76–1.27)
DEPRECATED RDW RBC AUTO: 43.2 FL (ref 37–54)
EGFRCR SERPLBLD CKD-EPI 2021: 90.5 ML/MIN/1.73
EOSINOPHIL # BLD AUTO: 0.16 10*3/MM3 (ref 0–0.4)
EOSINOPHIL NFR BLD AUTO: 2.1 % (ref 0.3–6.2)
ERYTHROCYTE [DISTWIDTH] IN BLOOD BY AUTOMATED COUNT: 13.1 % (ref 12.3–15.4)
GLOBULIN UR ELPH-MCNC: 2.4 GM/DL
GLUCOSE SERPL-MCNC: 92 MG/DL (ref 65–99)
HCT VFR BLD AUTO: 38.8 % (ref 37.5–51)
HGB BLD-MCNC: 12.9 G/DL (ref 13–17.7)
IMM GRANULOCYTES # BLD AUTO: 0.02 10*3/MM3 (ref 0–0.05)
IMM GRANULOCYTES NFR BLD AUTO: 0.3 % (ref 0–0.5)
INR PPP: 0.99 (ref 0.93–1.1)
LYMPHOCYTES # BLD AUTO: 2.51 10*3/MM3 (ref 0.7–3.1)
LYMPHOCYTES NFR BLD AUTO: 32.5 % (ref 19.6–45.3)
MAGNESIUM SERPL-MCNC: 2 MG/DL (ref 1.6–2.4)
MCH RBC QN AUTO: 30.5 PG (ref 26.6–33)
MCHC RBC AUTO-ENTMCNC: 33.2 G/DL (ref 31.5–35.7)
MCV RBC AUTO: 91.7 FL (ref 79–97)
MONOCYTES # BLD AUTO: 0.69 10*3/MM3 (ref 0.1–0.9)
MONOCYTES NFR BLD AUTO: 8.9 % (ref 5–12)
NEUTROPHILS NFR BLD AUTO: 4.28 10*3/MM3 (ref 1.7–7)
NEUTROPHILS NFR BLD AUTO: 55.4 % (ref 42.7–76)
NRBC BLD AUTO-RTO: 0 /100 WBC (ref 0–0.2)
PLATELET # BLD AUTO: 216 10*3/MM3 (ref 140–450)
PMV BLD AUTO: 10.1 FL (ref 6–12)
POTASSIUM SERPL-SCNC: 4.3 MMOL/L (ref 3.5–5.2)
PROT SERPL-MCNC: 6.4 G/DL (ref 6–8.5)
PROTHROMBIN TIME: 10.8 SECONDS (ref 9.6–11.7)
RBC # BLD AUTO: 4.23 10*6/MM3 (ref 4.14–5.8)
SODIUM SERPL-SCNC: 138 MMOL/L (ref 136–145)
WBC NRBC COR # BLD: 7.72 10*3/MM3 (ref 3.4–10.8)

## 2023-10-25 PROCEDURE — 85610 PROTHROMBIN TIME: CPT

## 2023-10-25 PROCEDURE — 36415 COLL VENOUS BLD VENIPUNCTURE: CPT

## 2023-10-25 PROCEDURE — 80053 COMPREHEN METABOLIC PANEL: CPT

## 2023-10-25 PROCEDURE — 85025 COMPLETE CBC W/AUTO DIFF WBC: CPT

## 2023-10-25 PROCEDURE — 83735 ASSAY OF MAGNESIUM: CPT

## 2023-10-25 RX ORDER — ATORVASTATIN CALCIUM 80 MG/1
TABLET, FILM COATED ORAL
Qty: 90 TABLET | Refills: 0 | Status: SHIPPED | OUTPATIENT
Start: 2023-10-25

## 2023-10-26 ENCOUNTER — ANESTHESIA EVENT (OUTPATIENT)
Dept: CARDIOLOGY | Facility: HOSPITAL | Age: 63
End: 2023-10-26
Payer: COMMERCIAL

## 2023-10-27 ENCOUNTER — ANESTHESIA (OUTPATIENT)
Dept: CARDIOLOGY | Facility: HOSPITAL | Age: 63
End: 2023-10-27
Payer: COMMERCIAL

## 2023-10-27 ENCOUNTER — HOSPITAL ENCOUNTER (OUTPATIENT)
Facility: HOSPITAL | Age: 63
Discharge: HOME | End: 2023-10-28
Attending: INTERNAL MEDICINE | Admitting: INTERNAL MEDICINE
Payer: COMMERCIAL

## 2023-10-27 ENCOUNTER — APPOINTMENT (OUTPATIENT)
Dept: GENERAL RADIOLOGY | Facility: HOSPITAL | Age: 63
End: 2023-10-27
Payer: COMMERCIAL

## 2023-10-27 DIAGNOSIS — Z95.810 PRESENCE OF BIVENTRICULAR IMPLANTABLE CARDIOVERTER-DEFIBRILLATOR (ICD): ICD-10-CM

## 2023-10-27 DIAGNOSIS — I50.22 CHRONIC SYSTOLIC CONGESTIVE HEART FAILURE: ICD-10-CM

## 2023-10-27 DIAGNOSIS — I50.22 CHRONIC SYSTOLIC CHF (CONGESTIVE HEART FAILURE): Primary | ICD-10-CM

## 2023-10-27 PROCEDURE — 71045 X-RAY EXAM CHEST 1 VIEW: CPT

## 2023-10-27 PROCEDURE — S0260 H&P FOR SURGERY: HCPCS | Performed by: INTERNAL MEDICINE

## 2023-10-27 PROCEDURE — 25510000001 IOPAMIDOL PER 1 ML: Performed by: INTERNAL MEDICINE

## 2023-10-27 PROCEDURE — 33225 L VENTRIC PACING LEAD ADD-ON: CPT | Performed by: INTERNAL MEDICINE

## 2023-10-27 PROCEDURE — C1900 LEAD, CORONARY VENOUS: HCPCS | Performed by: INTERNAL MEDICINE

## 2023-10-27 PROCEDURE — 25010000002 CEFAZOLIN PER 500 MG: Performed by: INTERNAL MEDICINE

## 2023-10-27 PROCEDURE — C1777 LEAD, AICD, ENDO SINGLE COIL: HCPCS | Performed by: INTERNAL MEDICINE

## 2023-10-27 PROCEDURE — 25010000002 PROPOFOL 1000 MG/100ML EMULSION: Performed by: NURSE ANESTHETIST, CERTIFIED REGISTERED

## 2023-10-27 PROCEDURE — C1882 AICD, OTHER THAN SING/DUAL: HCPCS | Performed by: INTERNAL MEDICINE

## 2023-10-27 PROCEDURE — 33249 INSJ/RPLCMT DEFIB W/LEAD(S): CPT | Performed by: INTERNAL MEDICINE

## 2023-10-27 PROCEDURE — C1769 GUIDE WIRE: HCPCS | Performed by: INTERNAL MEDICINE

## 2023-10-27 PROCEDURE — C1894 INTRO/SHEATH, NON-LASER: HCPCS | Performed by: INTERNAL MEDICINE

## 2023-10-27 PROCEDURE — 25010000002 LIDOCAINE 1 % SOLUTION: Performed by: INTERNAL MEDICINE

## 2023-10-27 PROCEDURE — 25010000002 CEFAZOLIN PER 500 MG: Performed by: NURSE ANESTHETIST, CERTIFIED REGISTERED

## 2023-10-27 PROCEDURE — C1898 LEAD, PMKR, OTHER THAN TRANS: HCPCS | Performed by: INTERNAL MEDICINE

## 2023-10-27 PROCEDURE — 25010000002 PHENYLEPHRINE 10 MG/ML SOLUTION: Performed by: NURSE ANESTHETIST, CERTIFIED REGISTERED

## 2023-10-27 PROCEDURE — 25810000003 SODIUM CHLORIDE 0.9 % SOLUTION 250 ML FLEX CONT: Performed by: NURSE ANESTHETIST, CERTIFIED REGISTERED

## 2023-10-27 PROCEDURE — 25010000002 PHENYLEPHRINE 10 MG/ML SOLUTION 5 ML VIAL: Performed by: NURSE ANESTHETIST, CERTIFIED REGISTERED

## 2023-10-27 PROCEDURE — 25010000002 FENTANYL CITRATE (PF) 50 MCG/ML SOLUTION: Performed by: NURSE ANESTHETIST, CERTIFIED REGISTERED

## 2023-10-27 PROCEDURE — 25010000002 FENTANYL CITRATE (PF) 100 MCG/2ML SOLUTION: Performed by: NURSE ANESTHETIST, CERTIFIED REGISTERED

## 2023-10-27 PROCEDURE — 25810000003 SODIUM CHLORIDE 0.9 % SOLUTION: Performed by: NURSE ANESTHETIST, CERTIFIED REGISTERED

## 2023-10-27 PROCEDURE — C1894 INTRO/SHEATH, NON-LASER: HCPCS

## 2023-10-27 DEVICE — INTEGRATED BIPOLAR PACE/SENSE AND DEFIBRILLATION LEAD
Type: IMPLANTABLE DEVICE | Status: FUNCTIONAL
Brand: RELIANCE 4-FRONT™

## 2023-10-27 DEVICE — PACE/SENSE LEAD
Type: IMPLANTABLE DEVICE | Status: FUNCTIONAL
Brand: INGEVITY™+

## 2023-10-27 DEVICE — CARDIAC RESYNCHRONIZATION THERAPY DEFIBRILLATOR
Type: IMPLANTABLE DEVICE | Status: FUNCTIONAL
Brand: RESONATE™ HF CRT-D

## 2023-10-27 DEVICE — PACE/SENSE LEAD
Type: IMPLANTABLE DEVICE | Status: FUNCTIONAL
Brand: ACUITY™ X4 STRAIGHT

## 2023-10-27 RX ORDER — PROPOFOL 10 MG/ML
INJECTION, EMULSION INTRAVENOUS AS NEEDED
Status: DISCONTINUED | OUTPATIENT
Start: 2023-10-27 | End: 2023-10-27 | Stop reason: SURG

## 2023-10-27 RX ORDER — HYDROCODONE BITARTRATE AND ACETAMINOPHEN 7.5; 325 MG/1; MG/1
1 TABLET ORAL EVERY 4 HOURS PRN
Status: DISCONTINUED | OUTPATIENT
Start: 2023-10-27 | End: 2023-10-28 | Stop reason: HOSPADM

## 2023-10-27 RX ORDER — DIPHENHYDRAMINE HYDROCHLORIDE 50 MG/ML
12.5 INJECTION INTRAMUSCULAR; INTRAVENOUS
Status: DISCONTINUED | OUTPATIENT
Start: 2023-10-27 | End: 2023-10-28 | Stop reason: HOSPADM

## 2023-10-27 RX ORDER — DROPERIDOL 2.5 MG/ML
0.62 INJECTION, SOLUTION INTRAMUSCULAR; INTRAVENOUS ONCE AS NEEDED
Status: DISCONTINUED | OUTPATIENT
Start: 2023-10-27 | End: 2023-10-28 | Stop reason: HOSPADM

## 2023-10-27 RX ORDER — HYDROCODONE BITARTRATE AND ACETAMINOPHEN 5; 325 MG/1; MG/1
1 TABLET ORAL ONCE AS NEEDED
Status: DISCONTINUED | OUTPATIENT
Start: 2023-10-27 | End: 2023-10-28 | Stop reason: HOSPADM

## 2023-10-27 RX ORDER — ALBUTEROL SULFATE 2.5 MG/3ML
2.5 SOLUTION RESPIRATORY (INHALATION) ONCE AS NEEDED
Status: DISCONTINUED | OUTPATIENT
Start: 2023-10-27 | End: 2023-10-28 | Stop reason: HOSPADM

## 2023-10-27 RX ORDER — FENTANYL CITRATE 50 UG/ML
INJECTION, SOLUTION INTRAMUSCULAR; INTRAVENOUS AS NEEDED
Status: DISCONTINUED | OUTPATIENT
Start: 2023-10-27 | End: 2023-10-27 | Stop reason: SURG

## 2023-10-27 RX ORDER — LISINOPRIL 5 MG/1
5 TABLET ORAL DAILY
Status: DISCONTINUED | OUTPATIENT
Start: 2023-10-27 | End: 2023-10-28 | Stop reason: HOSPADM

## 2023-10-27 RX ORDER — NITROGLYCERIN 0.4 MG/1
0.4 TABLET SUBLINGUAL
Status: DISCONTINUED | OUTPATIENT
Start: 2023-10-27 | End: 2023-10-28 | Stop reason: HOSPADM

## 2023-10-27 RX ORDER — SPIRONOLACTONE 25 MG/1
25 TABLET ORAL DAILY
Status: DISCONTINUED | OUTPATIENT
Start: 2023-10-27 | End: 2023-10-28 | Stop reason: HOSPADM

## 2023-10-27 RX ORDER — DEXMEDETOMIDINE HYDROCHLORIDE 100 UG/ML
INJECTION, SOLUTION INTRAVENOUS AS NEEDED
Status: DISCONTINUED | OUTPATIENT
Start: 2023-10-27 | End: 2023-10-27 | Stop reason: SURG

## 2023-10-27 RX ORDER — DIPHENHYDRAMINE HYDROCHLORIDE 50 MG/ML
12.5 INJECTION INTRAMUSCULAR; INTRAVENOUS ONCE AS NEEDED
Status: DISCONTINUED | OUTPATIENT
Start: 2023-10-27 | End: 2023-10-28 | Stop reason: HOSPADM

## 2023-10-27 RX ORDER — PROMETHAZINE HYDROCHLORIDE 25 MG/1
25 TABLET ORAL ONCE AS NEEDED
Status: DISCONTINUED | OUTPATIENT
Start: 2023-10-27 | End: 2023-10-28 | Stop reason: HOSPADM

## 2023-10-27 RX ORDER — PROPOFOL 10 MG/ML
INJECTION, EMULSION INTRAVENOUS CONTINUOUS PRN
Status: DISCONTINUED | OUTPATIENT
Start: 2023-10-27 | End: 2023-10-27 | Stop reason: SURG

## 2023-10-27 RX ORDER — LABETALOL HYDROCHLORIDE 5 MG/ML
5 INJECTION, SOLUTION INTRAVENOUS
Status: DISCONTINUED | OUTPATIENT
Start: 2023-10-27 | End: 2023-10-28 | Stop reason: HOSPADM

## 2023-10-27 RX ORDER — SODIUM CHLORIDE 9 MG/ML
INJECTION, SOLUTION INTRAVENOUS CONTINUOUS PRN
Status: DISCONTINUED | OUTPATIENT
Start: 2023-10-27 | End: 2023-10-27 | Stop reason: SURG

## 2023-10-27 RX ORDER — ACETAMINOPHEN 650 MG/1
650 SUPPOSITORY RECTAL EVERY 4 HOURS PRN
Status: DISCONTINUED | OUTPATIENT
Start: 2023-10-27 | End: 2023-10-28 | Stop reason: HOSPADM

## 2023-10-27 RX ORDER — ACETAMINOPHEN 325 MG/1
650 TABLET ORAL ONCE AS NEEDED
Status: DISCONTINUED | OUTPATIENT
Start: 2023-10-27 | End: 2023-10-28 | Stop reason: HOSPADM

## 2023-10-27 RX ORDER — FENTANYL CITRATE 50 UG/ML
50 INJECTION, SOLUTION INTRAMUSCULAR; INTRAVENOUS
Status: DISCONTINUED | OUTPATIENT
Start: 2023-10-27 | End: 2023-10-28 | Stop reason: HOSPADM

## 2023-10-27 RX ORDER — METOPROLOL SUCCINATE 50 MG/1
50 TABLET, EXTENDED RELEASE ORAL DAILY
Status: DISCONTINUED | OUTPATIENT
Start: 2023-10-27 | End: 2023-10-28 | Stop reason: HOSPADM

## 2023-10-27 RX ORDER — ATORVASTATIN CALCIUM 40 MG/1
80 TABLET, FILM COATED ORAL NIGHTLY
Status: DISCONTINUED | OUTPATIENT
Start: 2023-10-27 | End: 2023-10-28 | Stop reason: HOSPADM

## 2023-10-27 RX ORDER — LIDOCAINE HYDROCHLORIDE 20 MG/ML
INJECTION, SOLUTION EPIDURAL; INFILTRATION; INTRACAUDAL; PERINEURAL AS NEEDED
Status: DISCONTINUED | OUTPATIENT
Start: 2023-10-27 | End: 2023-10-27 | Stop reason: SURG

## 2023-10-27 RX ORDER — NITROGLYCERIN 0.4 MG/1
0.4 TABLET SUBLINGUAL
Status: DISCONTINUED | OUTPATIENT
Start: 2023-10-27 | End: 2023-10-27

## 2023-10-27 RX ORDER — LIDOCAINE HYDROCHLORIDE 10 MG/ML
INJECTION, SOLUTION INFILTRATION; PERINEURAL
Status: DISCONTINUED | OUTPATIENT
Start: 2023-10-27 | End: 2023-10-27 | Stop reason: HOSPADM

## 2023-10-27 RX ORDER — PROMETHAZINE HYDROCHLORIDE 25 MG/1
25 SUPPOSITORY RECTAL ONCE AS NEEDED
Status: DISCONTINUED | OUTPATIENT
Start: 2023-10-27 | End: 2023-10-28 | Stop reason: HOSPADM

## 2023-10-27 RX ORDER — PHENYLEPHRINE HYDROCHLORIDE 10 MG/ML
INJECTION INTRAVENOUS AS NEEDED
Status: DISCONTINUED | OUTPATIENT
Start: 2023-10-27 | End: 2023-10-27 | Stop reason: SURG

## 2023-10-27 RX ORDER — ONDANSETRON 2 MG/ML
4 INJECTION INTRAMUSCULAR; INTRAVENOUS ONCE AS NEEDED
Status: DISCONTINUED | OUTPATIENT
Start: 2023-10-27 | End: 2023-10-28 | Stop reason: HOSPADM

## 2023-10-27 RX ORDER — NALOXONE HCL 0.4 MG/ML
0.4 VIAL (ML) INJECTION AS NEEDED
Status: DISCONTINUED | OUTPATIENT
Start: 2023-10-27 | End: 2023-10-28 | Stop reason: HOSPADM

## 2023-10-27 RX ORDER — HYDRALAZINE HYDROCHLORIDE 20 MG/ML
5 INJECTION INTRAMUSCULAR; INTRAVENOUS
Status: DISCONTINUED | OUTPATIENT
Start: 2023-10-27 | End: 2023-10-28 | Stop reason: HOSPADM

## 2023-10-27 RX ADMIN — ATORVASTATIN CALCIUM 80 MG: 40 TABLET, FILM COATED ORAL at 19:57

## 2023-10-27 RX ADMIN — SODIUM CHLORIDE: 9 INJECTION, SOLUTION INTRAVENOUS at 11:26

## 2023-10-27 RX ADMIN — LISINOPRIL 5 MG: 5 TABLET ORAL at 17:09

## 2023-10-27 RX ADMIN — HYDROCODONE BITARTRATE AND ACETAMINOPHEN 1 TABLET: 7.5; 325 TABLET ORAL at 23:35

## 2023-10-27 RX ADMIN — DEXMEDETOMIDINE HYDROCHLORIDE 4 MCG: 100 INJECTION, SOLUTION INTRAVENOUS at 11:36

## 2023-10-27 RX ADMIN — METOPROLOL SUCCINATE 50 MG: 50 TABLET, EXTENDED RELEASE ORAL at 17:09

## 2023-10-27 RX ADMIN — CEFAZOLIN 2 G: 10 INJECTION, POWDER, FOR SOLUTION INTRAVENOUS at 11:37

## 2023-10-27 RX ADMIN — FENTANYL CITRATE 25 MCG: 50 INJECTION, SOLUTION INTRAMUSCULAR; INTRAVENOUS at 11:41

## 2023-10-27 RX ADMIN — PROPOFOL INJECTABLE EMULSION 30 MG: 10 INJECTION, EMULSION INTRAVENOUS at 11:36

## 2023-10-27 RX ADMIN — FENTANYL CITRATE 25 MCG: 50 INJECTION, SOLUTION INTRAMUSCULAR; INTRAVENOUS at 11:44

## 2023-10-27 RX ADMIN — PHENYLEPHRINE HYDROCHLORIDE 100 MCG: 10 INJECTION INTRAVENOUS at 12:07

## 2023-10-27 RX ADMIN — PROPOFOL INJECTABLE EMULSION 100 MCG/KG/MIN: 10 INJECTION, EMULSION INTRAVENOUS at 11:36

## 2023-10-27 RX ADMIN — LIDOCAINE HYDROCHLORIDE 40 MG: 20 INJECTION, SOLUTION EPIDURAL; INFILTRATION; INTRACAUDAL; PERINEURAL at 11:36

## 2023-10-27 RX ADMIN — FENTANYL CITRATE 50 MCG: 50 INJECTION, SOLUTION INTRAMUSCULAR; INTRAVENOUS at 14:13

## 2023-10-27 RX ADMIN — PHENYLEPHRINE HYDROCHLORIDE 0.2 MCG/KG/MIN: 10 INJECTION INTRAVENOUS at 12:07

## 2023-10-27 RX ADMIN — DEXMEDETOMIDINE HYDROCHLORIDE 4 MCG: 100 INJECTION, SOLUTION INTRAVENOUS at 11:54

## 2023-10-27 RX ADMIN — FENTANYL CITRATE 25 MCG: 50 INJECTION, SOLUTION INTRAMUSCULAR; INTRAVENOUS at 13:20

## 2023-10-27 RX ADMIN — SPIRONOLACTONE 25 MG: 25 TABLET ORAL at 17:09

## 2023-10-27 RX ADMIN — CEFAZOLIN 2000 MG: 2 INJECTION, POWDER, FOR SOLUTION INTRAMUSCULAR; INTRAVENOUS at 19:57

## 2023-10-27 RX ADMIN — HYDROCODONE BITARTRATE AND ACETAMINOPHEN 1 TABLET: 7.5; 325 TABLET ORAL at 18:38

## 2023-10-27 RX ADMIN — FENTANYL CITRATE 25 MCG: 50 INJECTION, SOLUTION INTRAMUSCULAR; INTRAVENOUS at 13:31

## 2023-10-27 NOTE — PLAN OF CARE
Goal Outcome Evaluation:  Plan of Care Reviewed With: patient, spouse        Progress: improving  Outcome Evaluation: Patient arrived to unit after BiV ICD placement. Patient c/o of moderate to to left upper chest incisional site--ice pack applied and see MAR. Patient resting abed with HOB above 45 degrees. Wife is at bedside. Will continue to monitor.

## 2023-10-27 NOTE — ANESTHESIA PREPROCEDURE EVALUATION
Anesthesia Evaluation     Patient summary reviewed and Nursing notes reviewed   NPO Solid Status: > 8 hours  NPO Liquid Status: > 8 hours           Airway   Mallampati: II  TM distance: >3 FB  Neck ROM: full  No difficulty expected  Dental - normal exam     Pulmonary    (+) ,sleep apnea  Cardiovascular     (+) hypertension, past MI , CAD, CABG, cardiac stents , dysrhythmias, hyperlipidemia      Neuro/Psych  GI/Hepatic/Renal/Endo      Musculoskeletal     Abdominal    Substance History      OB/GYN          Other        ROS/Med Hx Other: Indications  Ischemic cardiomyopathy     Technically satisfactory study.  Mitral valve is structurally normal.  Mild mitral regurgitation.  Tricuspid valve is structurally normal.  Aortic valve is structurally normal.  Pulmonic valve could not be well visualized.  No evidence for tricuspid or aortic regurgitation is seen by Doppler study.  Left atrium is normal in size.  Right atrium is normal in size.  Left ventricle is enlarged with diffuse hypocontractility with ejection fraction of 20 to 25%.  Right ventricle is normal in size.  Atrial septum is intact.  Aorta is normal.  No pericardial effusion or intracardiac thrombus is seen.     Impression  Structurally and functionally normal cardiac valves except for mild mitral regurgitation..  Left ventricle is enlarged with severe and diffuse hypocontractility with ejection fraction of 20 to 25%.                  Anesthesia Plan    ASA 4     general and MAC     intravenous induction     Anesthetic plan, risks, benefits, and alternatives have been provided, discussed and informed consent has been obtained with: patient.    Plan discussed with CRNA.    CODE STATUS:

## 2023-10-27 NOTE — H&P
History of present illness  Zia Lott is a 62-year-old male patient who has history of coronary artery disease status post CABG on 6/22/2023, combined ischemic and nonischemic cardiomyopathy, history of PCI in the past, chronic left bundle branch block, is here today to discuss about possibly scheduling for BiV ICD implantation.     Medical History        Past Medical History:   Diagnosis Date    BBB (bundle branch block)       lt bundle branch block    CAD (coronary artery disease)      Cardiomyopathy      CHD (coronary heart disease)      Congenital heart disease      Heart murmur      Hyperlipidemia      Hypertension      Myocardial infarction 02/18/2023     acute    Sleep apnea           Surgical History         Past Surgical History:   Procedure Laterality Date    CARDIAC CATHETERIZATION   05/30/2015    CARDIAC CATHETERIZATION N/A 6/2/2023     Procedure: Left Heart Cath with Coronary Angiography;  Surgeon: Judson Baumann MD;  Location: [facility] CATH INVASIVE LOCATION;  Service: Cardiovascular;  Laterality: N/A;    CARDIAC CATHETERIZATION N/A 6/2/2023     Procedure: Right Heart Cath;  Surgeon: Judson Baumann MD;  Location: [facility] CATH INVASIVE LOCATION;  Service: Cardiovascular;  Laterality: N/A;    CARDIAC CATHETERIZATION   6/2/2023     Procedure: Functional [place];  Surgeon: Yousif Dc MD;  Location: [facility] CATH INVASIVE LOCATION;  Service: Cardiovascular;;    COLONOSCOPY        CORONARY ANGIOPLASTY WITH STENT PLACEMENT   05/30/2015     stent lad and circumflex distal to the marginal branch    CORONARY ANGIOPLASTY WITH STENT PLACEMENT   06/2013     stent to lad    CORONARY ARTERY BYPASS GRAFT N/A 6/22/2023     Procedure: CORONARY ARTERY BYPASS GRAFTING, POSSIBLE IMPELLA PLACEMENT;  Surgeon: Pedro Monge MD;  Location: [facility] CVOR;  Service: Cardiothoracic;  Laterality: N/A;  CABG X 2 (1 vein graft, 1 JUDD graft), Off pump with perfusion assist.    INTERVENTIONAL RADIOLOGY PROCEDURE  N/A 2023     Procedure: Intravascular Ultrasound;  Surgeon: Yousif Dc MD;  Location: [facility] CATH INVASIVE LOCATION;  Service: Cardiovascular;  Laterality: N/A;    TRANSESOPHAGEAL ECHOCARDIOGRAM (YESENIA) N/A 2023     Procedure: TRANSESOPHAGEAL ECHOCARDIOGRAM WITH ANESTHESIA;  Surgeon: Pedro Monge MD;  Location: [facility] CVOR;  Service: Cardiothoracic;  Laterality: N/A;               Family History   Problem Relation Age of Onset    Diabetes Father      Heart failure Father        Social History            Tobacco Use    Smoking status: Former       Packs/day: 0.00       Years: 15.00       Pack years: 0.00       Types: Cigarettes       Quit date: 2023       Years since quittin.3       Passive exposure: Current    Smokeless tobacco: Never   Vaping Use    Vaping Use: Never used   Substance Use Topics    Alcohol use: Not Currently    Drug use: Not Currently        Prescriptions Prior to Admission   (Not in a hospital admission)      Allergies:  Adhesive tape     Review of systems     Constitutional: Negative.    Respiratory and cardiovascular: As detailed in HPI section.  Gastrointestinal: Negative for constipation, nausea and vomiting negative for abdominal distention, abdominal pain and diarrhea.   Genitourinary: Negative for difficulty urinating and flank pain.   Musculoskeletal: Negative for arthralgias, joint swelling and myalgias.   Skin: Negative for color change, rash and wound.   Neurological: Negative for dizziness, syncope, weakness and headaches.   Hematological: Negative for adenopathy.   Psychiatric/Behavioral: Negative for confusion.   All other systems reviewed and are negative.     Physical Exam  VITALS REVIEWED     General:      well developed, in no acute distress.    Head:      normocephalic and atraumatic.    Eyes:      PERRL/EOM intact, conjunctiva and sclera clear with out nystagmus.    Neck:      no masses, thyromegaly,  trachea central with normal respiratory effort and PMI  displaced laterally  Lungs:      Clear to auscultation bilaterally  Heart:       Regular rate and rhythm  Msk:      no deformity or scoliosis noted of thoracic or lumbar spine.    Pulses:      pulses normal in all 4 extremities.    Extremities:       No lower extremity edema  Neurologic:      no focal deficits.   alert oriented x3  Skin:      intact without lesions or rashes.    Psych:      alert and cooperative; normal mood and affect; normal attention span and concentration.             Result Review   :                    Pertinent cardiac workup     EKG 7/24/2023 sinus rhythm with left bundle branch block  Echocardiogram 9/5/2023 ejection fraction 20 to 25%.  Echocardiogram 5/8/2023 ejection fraction less than 20%.           Procedures            Assessment   Assessment and Plan       Zia Lott is a 62-year-old male patient who has coronary artery disease status post CABG in June 2023, combined ischemic and nonischemic cardiomyopathy with ejection fraction of 25% or less despite revascularization and optimal medical therapy for cardiomyopathy including lisinopril, Toprol and spironolactone.  Patient also has chronic left bundle branch block.  I personally reviewed the echocardiogram and he does have evidence of septal dyssynchrony.  Patient has class II heart failure symptoms which seems to be well compensated at this time.  Patient will benefit from implantation of a biventricular ICD for cardiac resynchronization therapy in an attempt to improve his cardiac function and CHF symptoms, and also to provide primary prevention of sudden cardiac death.  His life expectancy within ICD is greater than 1 year.  I went over the risk and benefits with the patient of having an ICD, using the [facility] decision aid, to help the patient make an informed decision to have the procedure done. The patient voiced understanding and agreed to proceed with defibrillator implantation.      Diagnoses and all  orders for this visit:     1. Cardiomyopathy, ischemic (Primary)     2. LBBB (left bundle branch block)  Overview:  Added automatically from request for surgery [id number]        3. Acute on chronic systolic heart failure     4. Chronic coronary artery disease

## 2023-10-28 VITALS
OXYGEN SATURATION: 91 % | RESPIRATION RATE: 22 BRPM | DIASTOLIC BLOOD PRESSURE: 67 MMHG | HEIGHT: 68 IN | TEMPERATURE: 97.9 F | SYSTOLIC BLOOD PRESSURE: 120 MMHG | HEART RATE: 74 BPM | WEIGHT: 177.25 LBS | BODY MASS INDEX: 26.86 KG/M2

## 2023-10-28 PROBLEM — Z95.810 PRESENCE OF BIVENTRICULAR IMPLANTABLE CARDIOVERTER-DEFIBRILLATOR (ICD): Status: ACTIVE | Noted: 2023-10-28

## 2023-10-28 PROCEDURE — 99024 POSTOP FOLLOW-UP VISIT: CPT | Performed by: INTERNAL MEDICINE

## 2023-10-28 PROCEDURE — 25010000002 CEFAZOLIN PER 500 MG: Performed by: INTERNAL MEDICINE

## 2023-10-28 RX ORDER — CALCIUM CARBONATE 500 MG/1
2 TABLET, CHEWABLE ORAL 3 TIMES DAILY PRN
Status: DISCONTINUED | OUTPATIENT
Start: 2023-10-28 | End: 2023-10-28 | Stop reason: HOSPADM

## 2023-10-28 RX ORDER — CEPHALEXIN 500 MG/1
500 CAPSULE ORAL 2 TIMES DAILY
Qty: 10 CAPSULE | Refills: 0 | Status: SHIPPED | OUTPATIENT
Start: 2023-10-28 | End: 2023-10-28 | Stop reason: SDUPTHER

## 2023-10-28 RX ORDER — CEPHALEXIN 500 MG/1
500 CAPSULE ORAL EVERY 8 HOURS SCHEDULED
Status: DISCONTINUED | OUTPATIENT
Start: 2023-10-28 | End: 2023-10-28 | Stop reason: HOSPADM

## 2023-10-28 RX ORDER — HYDROCODONE BITARTRATE AND ACETAMINOPHEN 5; 325 MG/1; MG/1
1 TABLET ORAL EVERY 6 HOURS PRN
Qty: 10 TABLET | Refills: 0 | Status: SHIPPED | OUTPATIENT
Start: 2023-10-28

## 2023-10-28 RX ORDER — CEPHALEXIN 500 MG/1
500 CAPSULE ORAL 2 TIMES DAILY
Qty: 10 CAPSULE | Refills: 0 | Status: SHIPPED | OUTPATIENT
Start: 2023-10-28 | End: 2023-11-02

## 2023-10-28 RX ADMIN — CEFAZOLIN 2000 MG: 2 INJECTION, POWDER, FOR SOLUTION INTRAMUSCULAR; INTRAVENOUS at 02:17

## 2023-10-28 RX ADMIN — METOPROLOL SUCCINATE 50 MG: 50 TABLET, EXTENDED RELEASE ORAL at 09:02

## 2023-10-28 RX ADMIN — CALCIUM CARBONATE (ANTACID) CHEW TAB 500 MG 2 TABLET: 500 CHEW TAB at 02:17

## 2023-10-28 RX ADMIN — LISINOPRIL 5 MG: 5 TABLET ORAL at 09:02

## 2023-10-28 RX ADMIN — HYDROCODONE BITARTRATE AND ACETAMINOPHEN 1 TABLET: 7.5; 325 TABLET ORAL at 09:02

## 2023-10-28 RX ADMIN — SPIRONOLACTONE 25 MG: 25 TABLET ORAL at 09:02

## 2023-10-28 NOTE — DISCHARGE SUMMARY
Date of Discharge:  10/28/2023    Discharge Diagnosis:     Chronic systolic heart failure  Dilated cardiomyopathy  Status post biventricular ICD implant      Presenting Problem/History of Present Illness    Active Hospital Problems    Diagnosis  POA    **Acute on chronic systolic heart failure [I50.23]  Unknown    Presence of biventricular implantable cardioverter-defibrillator (ICD) [Z95.810]  Unknown    Chronic systolic CHF (congestive heart failure) [I50.22]  Yes      Resolved Hospital Problems   No resolved problems to display.          Consulting Physicians    Dr. Kris Sarmiento        Hospital Course  For full history and physical details refer to dictated H&P.  Patient was admitted for elective implant of a biventricular ICD due to his history of nonischemic cardiomyopathy, left bundle branch block, primary prevention for sudden cardiac death and cardiac resynchronization.    Post procedure there were no complications.  Chest x-ray showed no pneumothorax.  Device interrogation this morning is reported to be stable.    Dressing is clean and dry.    Patient is being discharged home in stable condition with outpatient follow-up planned with Dr. Kris Sarmiento.  He has been prescribed antibiotics and pain medication.      Procedures Performed      Procedure(s):  Implant ICD - bi ventricular, [facility] REP EMAILED  -------------------       Consults:     Consults       No orders found for last 30 day(s).            Pertinent Test Results:     cardiac graphics:      ECG:       Echocardiogram: Results for orders placed during the hospital encounter of 09/05/23    Adult Transthoracic Echo Complete W/ Cont if Necessary Per Protocol    Interpretation Summary    Left ventricular ejection fraction appears to be 21 - 25%.    Indications  Ischemic cardiomyopathy    Technically satisfactory study.  Mitral valve is structurally normal.  Mild mitral regurgitation.  Tricuspid valve is structurally normal.  Aortic valve is  "structurally normal.  Pulmonic valve could not be well visualized.  No evidence for tricuspid or aortic regurgitation is seen by Doppler study.  Left atrium is normal in size.  Right atrium is normal in size.  Left ventricle is enlarged with diffuse hypocontractility with ejection fraction of 20 to 25%.  Right ventricle is normal in size.  Atrial septum is intact.  Aorta is normal.  No pericardial effusion or intracardiac thrombus is seen.    Impression  Structurally and functionally normal cardiac valves except for mild mitral regurgitation..  Left ventricle is enlarged with severe and diffuse hypocontractility with ejection fraction of 20 to 25%.              LABS      CBC    Results from last 7 days   Lab Units 10/25/23  1113   WBC 10*3/mm3 7.72   HEMOGLOBIN g/dL 12.9*   PLATELETS 10*3/mm3 216     BMP   Results from last 7 days   Lab Units 10/25/23  1113   SODIUM mmol/L 138   POTASSIUM mmol/L 4.3   CHLORIDE mmol/L 103   CO2 mmol/L 28.0   BUN mg/dL 10   CREATININE mg/dL 0.94   GLUCOSE mg/dL 92   MAGNESIUM mg/dL 2.0     Cr Clearance Estimated Creatinine Clearance: 90.3 mL/min (by C-G formula based on SCr of 0.94 mg/dL).  Coag   Results from last 7 days   Lab Units 10/25/23  1113   INR  0.99     HbA1C   Lab Results   Component Value Date    HGBA1C 5.80 (H) 06/20/2023    HGBA1C 5.5 12/08/2022    HGBA1C 5.7 (H) 12/06/2021     Blood Glucose No results found for: \"POCGLU\"  Infection     CMP   Results from last 7 days   Lab Units 10/25/23  1113   SODIUM mmol/L 138   POTASSIUM mmol/L 4.3   CHLORIDE mmol/L 103   CO2 mmol/L 28.0   BUN mg/dL 10   CREATININE mg/dL 0.94   GLUCOSE mg/dL 92   ALBUMIN g/dL 4.0   BILIRUBIN mg/dL 0.4   ALK PHOS U/L 54   AST (SGOT) U/L 17   ALT (SGPT) U/L 17     ABG      UA      MOY  No results found for: \"POCMETH\", \"POCAMPHET\", \"POCBARBITUR\", \"POCBENZO\", \"POCCOCAINE\", \"POCOPIATES\", \"POCOXYCODO\", \"POCPHENCYC\", \"POCPROPOXY\", \"POCTHC\", \"POCTRICYC\"  Lysis Labs   Results from last 7 days   Lab Units " 10/25/23  1113   INR  0.99   HEMOGLOBIN g/dL 12.9*   PLATELETS 10*3/mm3 216   CREATININE mg/dL 0.94     Radiology(recent) XR Chest 1 View    Result Date: 10/27/2023  Impression: Interval placement of a cardiac pacemaker. No acute cardiopulmonary process. Electronically Signed: Melissa Tiwari MD  10/27/2023 1:27 PM CDT  Workstation ID: [id number]     Imaging Results (Last 24 Hours)       Procedure Component Value Units Date/Time    XR Chest 1 View [[id number]] Collected: 10/27/23 1426     Updated: 10/27/23 1429    Narrative:      XR CHEST 1 VW    Date of Exam: 10/27/2023 1:06 PM CDT    Indication: Post ICD / Pacer Implant    Comparison: Chest x-ray 6/25/2023    Findings:  The heart is prominent and stable in size. Median sternotomy wires are noted. There is a new cardiac pacemaker overlying the anterior left chest wall. No evidence for pneumothorax. No pleural effusion or focal infiltrate.      Impression:      Impression:  Interval placement of a cardiac pacemaker. No acute cardiopulmonary process.      Electronically Signed: Melissa Tiwari MD    10/27/2023 1:27 PM CDT    Workstation ID: [id number]                      Condition on Discharge: Stable    Vital Signs    Temp:  [97.7 °F (36.5 °C)-98.2 °F (36.8 °C)] 97.9 °F (36.6 °C)  Heart Rate:  [63-74] 74  Resp:  [10-26] 22  BP: (108-148)/(67-84) 120/67    Physical Exam:    Vitals reviewed.   Constitutional:       General: Not in acute distress.     Appearance: Normal appearance. Well-developed.   Eyes:      Pupils: Pupils are equal, round, and reactive to light.   HENT:      Head: Normocephalic and atraumatic.   Neck:      Vascular: No JVD.   Pulmonary:      Effort: Pulmonary effort is normal.      Breath sounds: Normal breath sounds.   Cardiovascular:      Normal rate. Regular rhythm.      Comments: Dressing left upper chest without significant redness or swelling.  There is no [id number]  Pulses:     Intact distal pulses.   Edema:     Peripheral edema absent.    Abdominal:      General: There is no distension.      Palpations: Abdomen is soft.      Tenderness: There is no abdominal tenderness.   Musculoskeletal: Normal range of motion.      Cervical back: Normal range of motion and neck supple. Skin:     General: Skin is warm and dry.   Neurological:      Mental Status: Alert and oriented to person, place, and time.         Discharge Disposition    Home or Self Care    Discharge Medications       Discharge Medications        New Medications        Instructions Start Date   cephalexin 500 MG capsule  Commonly known as: Keflex   500 mg, Oral, 2 Times Daily      HYDROcodone-acetaminophen 5-325 MG per tablet  Commonly known as: NORCO   1 tablet, Oral, Every 6 Hours PRN             Continue These Medications        Instructions Start Date   aspirin 81 MG EC tablet   81 mg, Oral, Daily      atorvastatin 80 MG tablet  Commonly known as: LIPITOR   Take 1 tablet by mouth once daily      clopidogrel 75 MG tablet  Commonly known as: PLAVIX   Take 1 tablet by mouth once daily      ezetimibe 10 MG tablet  Commonly known as: ZETIA   Take 1 tablet by mouth once daily      lisinopril 5 MG tablet  Commonly known as: PRINIVIL,ZESTRIL   Take 1 tablet by mouth once daily      metoprolol succinate XL 25 MG 24 hr tablet  Commonly known as: TOPROL-XL   50 mg, Oral, Daily      nitroglycerin 0.4 MG SL tablet  Commonly known as: Nitrostat   0.4 mg, Sublingual, Every 5 Minutes PRN, Take no more than 3 doses in 15 minutes.      spironolactone 25 MG tablet  Commonly known as: ALDACTONE   25 mg, Oral, Daily      Vitamin D (Cholecalciferol) 10 MCG (400 UNIT) tablet  Commonly known as: CHOLECALCIFEROL   400 Units, Oral, Daily               Discharge Diet:     Activity at Discharge:   Activity Instructions       Lifting Restrictions      Type of Restriction: Lifting    Lifting Restrictions: Other    Explain Lifing Restrictions: No extreme range of motion left arm.  Wear sling as directed             Follow-up Appointments    Future Appointments   Date Time Provider Department Center   12/5/2023  8:50 AM LAB [facility]   12/11/2023  3:30 PM Bryan Beckham MD [facility]     Additional Instructions for the Follow-ups that You Need to Schedule       Call MD With Problems / Concerns   As directed      Instructions: Dr. Bowman office with any problems or questions    Order Comments: Instructions: Dr. Bowman office with any problems or questions         Discharge Follow-up with Specialty: Cardiology; 1 Week   As directed      Specialty: Cardiology   Follow Up: 1 Week   Follow Up Details: call for appt on monday am                Test Results Pending at Discharge         LAKESHIA Ellsworth  10/28/23  11:13 EDT    Time: Discharge 35 min

## 2023-10-28 NOTE — SIGNIFICANT NOTE
Case Management Discharge Note      Final Note: (P) d/c home         Selected Continued Care - Discharged on 10/28/2023 Admission date: 10/27/2023 - Discharge disposition: Home or Self Care                        Final Discharge Disposition Code: 01 - home or self-care

## 2023-10-28 NOTE — PLAN OF CARE
Problem: Adult Inpatient Plan of Care  Goal: Absence of Hospital-Acquired Illness or Injury  Intervention: Identify and Manage Fall Risk  Recent Flowsheet Documentation  Taken 10/28/2023 0400 by Candie Diamond LPN  Safety Promotion/Fall Prevention:   activity supervised   assistive device/personal items within reach   clutter free environment maintained   fall prevention program maintained   gait belt   lighting adjusted   mobility aid in reach   muscle strengthening facilitated   nonskid shoes/slippers when out of bed   room organization consistent   safety round/check completed  Taken 10/28/2023 0200 by Candie Diamond LPN  Safety Promotion/Fall Prevention:   activity supervised   assistive device/personal items within reach   clutter free environment maintained   fall prevention program maintained   gait belt   mobility aid in reach   lighting adjusted   muscle strengthening facilitated   nonskid shoes/slippers when out of bed   room organization consistent   safety round/check completed  Taken 10/28/2023 0000 by Candie Diamond LPN  Safety Promotion/Fall Prevention:   activity supervised   assistive device/personal items within reach   clutter free environment maintained   fall prevention program maintained   lighting adjusted   gait belt   mobility aid in reach   muscle strengthening facilitated   nonskid shoes/slippers when out of bed   room organization consistent   safety round/check completed  Taken 10/27/2023 2200 by Candie Diamond LPN  Safety Promotion/Fall Prevention:   activity supervised   assistive device/personal items within reach   clutter free environment maintained   fall prevention program maintained   gait belt   lighting adjusted   mobility aid in reach   muscle strengthening facilitated   nonskid shoes/slippers when out of bed   room organization consistent   safety round/check completed  Taken 10/27/2023 2000 by Candie Diamond LPN  Safety Promotion/Fall Prevention:   activity  supervised   assistive device/personal items within reach   clutter free environment maintained   fall prevention program maintained   gait belt   lighting adjusted   mobility aid in reach   muscle strengthening facilitated   nonskid shoes/slippers when out of bed   room organization consistent   safety round/check completed  Intervention: Prevent Skin Injury  Recent Flowsheet Documentation  Taken 10/28/2023 0400 by Candie Diamond LPN  Body Position:   position changed independently   weight shifting  Skin Protection:   adhesive use limited   transparent dressing maintained   tubing/devices free from skin contact  Taken 10/28/2023 0200 by Candie Diamond LPN  Body Position:   position changed independently   weight shifting  Skin Protection:   adhesive use limited   transparent dressing maintained   tubing/devices free from skin contact  Taken 10/28/2023 0000 by Candie Diamond LPN  Body Position:   position changed independently   weight shifting  Skin Protection:   adhesive use limited   tubing/devices free from skin contact   transparent dressing maintained  Taken 10/27/2023 2200 by Candie Diamond LPN  Body Position:   position changed independently   weight shifting  Skin Protection:   adhesive use limited   transparent dressing maintained   tubing/devices free from skin contact  Taken 10/27/2023 2000 by Candie Diamond LPN  Body Position:   position changed independently   weight shifting   supine  Skin Protection:   adhesive use limited   transparent dressing maintained   tubing/devices free from skin contact  Intervention: Prevent and Manage VTE (Venous Thromboembolism) Risk  Recent Flowsheet Documentation  Taken 10/28/2023 0400 by Candie Diamond LPN  Activity Management: activity minimized  Taken 10/28/2023 0200 by Candie Diamond LPN  Activity Management: activity minimized  Taken 10/28/2023 0000 by Candie Diamond LPN  Activity Management: activity minimized  Taken 10/27/2023 2200 by Candie Diamond  LPN  Activity Management: activity minimized  Taken 10/27/2023 2000 by Candie Diamond LPN  Activity Management: activity minimized  VTE Prevention/Management:   bilateral   compression stockings off   sequential compression devices off  Range of Motion: (except for left upper extremity)   active ROM (range of motion) encouraged   other (see comments)  Intervention: Prevent Infection  Recent Flowsheet Documentation  Taken 10/28/2023 0400 by Candie Diamond LPN  Infection Prevention:   environmental surveillance performed   equipment surfaces disinfected   hand hygiene promoted   rest/sleep promoted   single patient room provided  Taken 10/28/2023 0200 by Candie Diamond LPN  Infection Prevention:   environmental surveillance performed   equipment surfaces disinfected   hand hygiene promoted   rest/sleep promoted   single patient room provided  Taken 10/28/2023 0000 by Candie Diamond LPN  Infection Prevention:   equipment surfaces disinfected   environmental surveillance performed   hand hygiene promoted   single patient room provided   rest/sleep promoted  Taken 10/27/2023 2200 by Candie Diamond LPN  Infection Prevention:   environmental surveillance performed   equipment surfaces disinfected   hand hygiene promoted   rest/sleep promoted   single patient room provided  Taken 10/27/2023 2000 by Candie Diamond LPN  Infection Prevention:   environmental surveillance performed   equipment surfaces disinfected   hand hygiene promoted   rest/sleep promoted   single patient room provided  Goal: Optimal Comfort and Wellbeing  Intervention: Monitor Pain and Promote Comfort  Recent Flowsheet Documentation  Taken 10/28/2023 0400 by Candie Diamond LPN  Pain Management Interventions:   care clustered   quiet environment facilitated  Taken 10/28/2023 0000 by Candie Diamond LPN  Pain Management Interventions:   care clustered   pain management plan reviewed with patient/caregiver   quiet environment facilitated  Taken  10/27/2023 2335 by Candie Diamond LPN  Pain Management Interventions:   care clustered   pain management plan reviewed with patient/caregiver   quiet environment facilitated   see MAR  Taken 10/27/2023 2000 by Candie Diamond LPN  Pain Management Interventions:   care clustered   pain management plan reviewed with patient/caregiver   quiet environment facilitated   see MAR  Taken 10/27/2023 1908 by Candie Diamond LPN  Pain Management Interventions:   care clustered   pain management plan reviewed with patient/caregiver   quiet environment facilitated   see MAR  Intervention: Provide Person-Centered Care  Recent Flowsheet Documentation  Taken 10/27/2023 2000 by Candie Diamond LPN  Trust Relationship/Rapport:   care explained   choices provided   emotional support provided   empathic listening provided   questions answered   questions encouraged   reassurance provided   thoughts/feelings acknowledged     Problem: Pain Acute  Goal: Acceptable Pain Control and Functional Ability  Intervention: Prevent or Manage Pain  Recent Flowsheet Documentation  Taken 10/28/2023 0400 by Candie Diamond LPN  Medication Review/Management: medications reviewed  Taken 10/28/2023 0200 by Candie Diamond LPN  Medication Review/Management: medications reviewed  Taken 10/28/2023 0000 by Candie Diamond LPN  Medication Review/Management: medications reviewed  Taken 10/27/2023 2200 by Candie Diamond LPN  Medication Review/Management: medications reviewed  Taken 10/27/2023 2000 by Candie Diamond LPN  Sensory Stimulation Regulation: television on  Bowel Elimination Promotion: adequate fluid intake promoted  Sleep/Rest Enhancement:   regular sleep/rest pattern promoted   relaxation techniques promoted  Medication Review/Management: medications reviewed  Intervention: Develop Pain Management Plan  Recent Flowsheet Documentation  Taken 10/28/2023 0400 by Candie Diamond LPN  Pain Management Interventions:   care clustered   quiet environment  facilitated  Taken 10/28/2023 0000 by Candie Diamond LPN  Pain Management Interventions:   care clustered   pain management plan reviewed with patient/caregiver   quiet environment facilitated  Taken 10/27/2023 2335 by Candie Diamond LPN  Pain Management Interventions:   care clustered   pain management plan reviewed with patient/caregiver   quiet environment facilitated   see MAR  Taken 10/27/2023 2000 by Candie Diamond LPN  Pain Management Interventions:   care clustered   pain management plan reviewed with patient/caregiver   quiet environment facilitated   see MAR  Taken 10/27/2023 1908 by Candie Diamond LPN  Pain Management Interventions:   care clustered   pain management plan reviewed with patient/caregiver   quiet environment facilitated   see MAR  Intervention: Optimize Psychosocial Wellbeing  Recent Flowsheet Documentation  Taken 10/27/2023 2000 by Candie Diamond LPN  Supportive Measures:   active listening utilized   positive reinforcement provided   verbalization of feelings encouraged  Diversional Activities: television   Goal Outcome Evaluation:         Patient is alert and oriented x4, and currently on 2L of O2 per nasal cannula. Patient has received IV antibiotics during current shift. Patient has had intermittent pain at incision site of pacemaker that has been relieved and treated with Q4 PRN - PO [place]. Patient stated they developed some indigestion after IV antibiotics administration. On call provider was contacted and TID PRN - PO [place] was ordered. Will continue to monitor patient and their well being during current shift.

## 2023-10-30 ENCOUNTER — TELEPHONE (OUTPATIENT)
Dept: FAMILY MEDICINE CLINIC | Facility: CLINIC | Age: 63
End: 2023-10-30
Payer: COMMERCIAL

## 2023-10-30 ENCOUNTER — TELEPHONE (OUTPATIENT)
Dept: CARDIOLOGY | Facility: CLINIC | Age: 63
End: 2023-10-30
Payer: COMMERCIAL

## 2023-10-30 NOTE — TELEPHONE ENCOUNTER
Form has been signed by Dr Baumann     I have faxed the form   It is on [employer] desk     Pt sent a Realie message asking about the form   I replied to pt letting him know it has been faxed

## 2023-10-30 NOTE — ANESTHESIA POSTPROCEDURE EVALUATION
Patient: Zia Lott    Procedure Summary       Date: 10/27/23 Room / Location: [facility] / [facility]    Anesthesia Start: 1127 Anesthesia Stop: 1359    Procedure: Implant ICD - bi ventricular, [employer] REP EMAILED Diagnosis:       Chronic systolic congestive heart failure      (NYHA class II CHF despite revascularization and optimal medical therapy and EF < 35%, LBBB)    Providers: Kaushal Zamora MD Provider: Gentry Bowman MD    Anesthesia Type: general, MAC ASA Status: 4            Anesthesia Type: general, MAC    Vitals  Vitals Value Taken Time   /70 10/27/23 1431   Temp     Pulse 67 10/27/23 1434   Resp     SpO2 96 % 10/27/23 1435   Vitals shown include unfiled device data.        Post Anesthesia Care and Evaluation    Patient location during evaluation: PACU  Patient participation: complete - patient participated  Level of consciousness: awake  Pain scale: See nurse's notes for pain score.  Pain management: adequate    Airway patency: patent  Anesthetic complications: No anesthetic complications  PONV Status: none  Cardiovascular status: acceptable  Respiratory status: acceptable and spontaneous ventilation  Hydration status: acceptable    Comments: Patient seen and examined postoperatively; vital signs stable; SpO2 greater than or equal to 90%; cardiopulmonary status stable; nausea/vomiting adequately controlled; pain adequately controlled; no apparent anesthesia complications; patient discharged from anesthesia care when discharge criteria were met

## 2023-10-30 NOTE — TELEPHONE ENCOUNTER
PATIENT WAS RELEASED FROM THE HOSPITAL ON 10/28 AFTER HAVING A PACEMAKER PLACED AND ONLY WANTS TO FOLLOW UP WITH [facility] BUT I DID NOT SEE ANY AVAILABILITY COMING UP.

## 2023-10-30 NOTE — TELEPHONE ENCOUNTER
"Caller: Zia Lott \"Ric\"    Relationship to patient: Self    Best call back number: [phone] (home)     Chief complaint: NO TIME AVAILABLE    Type of visit: HOSPITAL FOLLOW UP    Requested date: 1 WEEK (11.4.23)     Additional notes:PATIENT HAD A PACEMAKER PUT IN ON 10.27.23 AND NEEDS A 1 WEEK HOSPITAL FOLLOW UP BUT THERE IS NOTHING AVAILABLE, PLEASE CALL PATIENT BACK REGARDING APPT. THANK YOU!    "

## 2023-10-31 NOTE — TELEPHONE ENCOUNTER
We do not have anything open through the end of the year, patient will need to follow up with one of the other providers.

## 2023-10-31 NOTE — TELEPHONE ENCOUNTER
I spoke with pt to schedule device check/wound check with [facility] only per Dr Zamora for 11/9/23 at 3:30pm. Per Dr Zamora pt will see [facility] and then when he has to come back he will see Dr Baumann to follow up.

## 2023-11-09 ENCOUNTER — CLINICAL SUPPORT NO REQUIREMENTS (OUTPATIENT)
Dept: CARDIOLOGY | Facility: CLINIC | Age: 63
End: 2023-11-09
Payer: COMMERCIAL

## 2023-11-09 ENCOUNTER — OFFICE VISIT (OUTPATIENT)
Dept: CARDIOLOGY | Facility: CLINIC | Age: 63
End: 2023-11-09
Payer: COMMERCIAL

## 2023-11-09 VITALS
BODY MASS INDEX: 27.58 KG/M2 | WEIGHT: 182 LBS | HEART RATE: 66 BPM | HEIGHT: 68 IN | DIASTOLIC BLOOD PRESSURE: 74 MMHG | OXYGEN SATURATION: 98 % | SYSTOLIC BLOOD PRESSURE: 119 MMHG

## 2023-11-09 DIAGNOSIS — I25.5 ISCHEMIC CARDIOMYOPATHY: ICD-10-CM

## 2023-11-09 DIAGNOSIS — Z95.1 HX OF CABG: ICD-10-CM

## 2023-11-09 DIAGNOSIS — Z95.810 PRESENCE OF BIVENTRICULAR IMPLANTABLE CARDIOVERTER-DEFIBRILLATOR (ICD): ICD-10-CM

## 2023-11-09 DIAGNOSIS — I10 ESSENTIAL HYPERTENSION: ICD-10-CM

## 2023-11-09 DIAGNOSIS — I25.5 CARDIOMYOPATHY, ISCHEMIC: Primary | ICD-10-CM

## 2023-11-09 DIAGNOSIS — Z95.820 STATUS POST ANGIOPLASTY WITH STENT: ICD-10-CM

## 2023-11-09 DIAGNOSIS — I44.7 LBBB (LEFT BUNDLE BRANCH BLOCK): ICD-10-CM

## 2023-11-09 DIAGNOSIS — I50.22 CHRONIC SYSTOLIC CHF (CONGESTIVE HEART FAILURE): ICD-10-CM

## 2023-11-09 DIAGNOSIS — I50.23 ACUTE ON CHRONIC SYSTOLIC HEART FAILURE: ICD-10-CM

## 2023-11-09 DIAGNOSIS — E78.5 DYSLIPIDEMIA: ICD-10-CM

## 2023-11-09 DIAGNOSIS — I25.10 CHRONIC CORONARY ARTERY DISEASE: ICD-10-CM

## 2023-11-09 NOTE — PROGRESS NOTES
Encounter Date:11/09/2023  Last seen 9/5/2023      Patient ID: Zia Lott is a 62 y.o. male.    @@@@@@@ patient was seen for follow-up of CABG and cardiomyopathy and not necessarily ACE related problems.@@@@@@@@     Chief Complaint:     Status post CABG  Ischemic cardiomyopathy  Hypertension  Dyslipidemia  Status post BiV ICD     History of Present Illness  Patient recently had BiV ICD placement.    Since I have last seen, the patient has been without any chest discomfort ,shortness of breath, palpitations, dizziness or syncope.  Denies having any headache ,abdominal pain ,nausea, vomiting , diarrhea constipation, loss of weight or loss of appetite.  Denies having any excessive bruising ,hematuria or blood in the stool.     Review of all systems negative except as indicated.     Reviewed ROS.    Assessment and Plan         ////////////////////  History  ==========  -Status post CABG x 2 with a LIMA to the mid LAD and reverse vein graft to the ramus intermedius on pump off cross-clamp  Dr. Monge-6/22/2023     -History of myocardial infarction in [facility] 2/18/2023  Cardiac cath 2/20/2023 (details not available)  Patient apparently was told he has complex disease and not comfortable in performing intervention in the [facility].      - status post stent to LAD 05/30/2015 and 06/20/2013.     Status post subendocardial myocardial infarction 05/30/2015 prior to stent placement.     - Ischemic cardiomyopathy.-Ejection fraction 15%.-3/13/2023    - Status post BiV ICD ([employer])-10/27/2023-Dr. Zamora     Echocardiogram 9/5/2023   Structurally and functionally normal cardiac valves except for mild mitral regurgitation..  Left ventricle is enlarged with severe and diffuse hypocontractility with ejection fraction of 20 to 25%.     Echocardiogram 3/13/2023 revealed  Structurally and functionally normal cardiac valves.  Significant left ventricle enlargement with severe and diffuse hypocontractility with  ejection fraction of 15%.     Cardiac cath 6/2/2023   Mild pulmonary hypertension.  Left ventricle is significantly enlarged with severe and diffuse hypocontractility with ejection fraction of 20 to 25%.  No mitral regurgitation is seen.     Left main coronary artery has distal 70 to 75% disease (IVUS)  Left anterior descending artery stent is patent.  Ostial left anterior sending artery has calcific 80% disease (0.65 by IFR)  Diagonal branch is a small caliber vessel that has ostial 70% disease.  Circumflex coronary artery has ostial 90 to 95% disease and 90% disease distal to the second marginal branch.  (IFR 0.68)  First marginal branch has ostial 70% disease.  Second marginal branch is totally occluded at the origin and distal vessel is filling faintly from right circulation.  Right coronary artery has significant anterior origin and did not have any significant disease.     The films were reviewed with Dr. Dc interventionalists.  IFR and IVUS was performed (please see above).  Patient has significant left ventricular dysfunction.  Patient was thought to have coronary artery disease that is not easily amenable for intervention and potentially could compromise circumflex if an attempt is made to stent the left main and left anterior descending artery.  We will reinitiate discussion with cardiovascular surgery Dr. Monge regarding surgical intervention.  However significant left ventricular dysfunction is of concern.     Cardiac catheterization 2015 revealed -  Previously placed LAD stent was patent.  Distal LAD has 50% disease.  Chronic and total occlusion of 1st marginal branch.  Distal marginal branch was filling from RCA.  Circumflex coronary artery has 90-95%  distal to the origin of marginal branch.  RCA is a large and dominant vessel that has diffuse 50% disease.     Echocardiogram 09/11/2018 revealed severe left ventricular dysfunction with ejection fraction of 25%.  Moderate mitral and tricuspid  regurgitation is present.     Stress Cardiolite test 09/11/2018 revealed significant anterior apical inferior and poster lateral infarction and ischemia.       -status post anterior wall myocardial infarction 06/2013 and stent placement to LAD 06/2013.       -ischemic cardiomyopathy.  Recent left ventricular ejection fraction was 25-30%.       -smoker -patient was advised to abstain from smoking.        -chronic left bundle-branch block      -hypertension and dyslipidemia      -history of drinking alcohol     -no known allergies  ===========   Plan  =============  Status post CABG x 2 with a LIMA to the mid LAD and reverse vein graft to the ramus intermedius on pump off cross-clamp  Dr. Monge-6/22/2023     Ischemic cardiomyopathy  Patient is on GDMT.    Status post BiV ICD 10/27/2023-Dr. Zamora.  Left ventricular lead was reprogrammed with better vector.  ICD site looks normal.  Interrogation of the ICD revealed excellent pacing parameters except for reprogramming of the LV lead.  Battery status is 8.5 years.       Echocardiogram 9/5/2023 revealed  Structurally and functionally normal cardiac valves except for mild mitral regurgitation..  Left ventricle is enlarged with severe and diffuse hypocontractility with ejection fraction of 20 to 25%.     Rhythm-sinus.  EKG 7/24/2023 revealed sinus rhythm nonspecific intraventricular conduction delay normal axis no ectopy no significant change from 6/24/2023     Dyslipidemia-on atorvastatin  Discontinue Zetia.     Medications were reviewed and updated.  Patient is on aspirin atorvastatin amiodarone 200 mg twice daily Lasix 40 mg a day carphenazine metoprolol tartrate 12.5 mg twice daily pantoprazole.  Spironolactone 25 mg a day and low-dose lisinopril at 2.5 mg a day.     Reduce amiodarone to 200 mg once a day.  Consider discontinuation of amiodarone at next visit.      Patient would like to return to work.  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     Further plan will depend on patient's progress.     Reviewed and updated-11/9/2023  ]]]]]]]]]]]]]]]]]]]]]             Diagnosis Plan   1. Cardiomyopathy, ischemic        2. Hx of CABG        3. LBBB (left bundle branch block)        4. Chronic coronary artery disease        5. Acute on chronic systolic heart failure        6. Ischemic cardiomyopathy        7. Status post angioplasty with stent        8. Dyslipidemia        9. Essential hypertension        10. Presence of biventricular implantable cardioverter-defibrillator (ICD)        LAB RESULTS (LAST 7 DAYS)    CBC        BMP        CMP         BNP        TROPONIN        CoAg        Creatinine Clearance  Estimated Creatinine Clearance: 82.4 mL/min (by C-G formula based on SCr of 0.94 mg/dL).    ABG        Radiology  No radiology results for the last day                The following portions of the patient's history were reviewed and updated as appropriate: allergies, current medications, past family history, past medical history, past social history, past surgical history, and problem list.    Review of Systems   Constitutional: Negative for malaise/fatigue.   Cardiovascular:  Negative for chest pain, dyspnea on exertion, leg swelling and palpitations.   Respiratory:  Negative for cough and shortness of breath.    Gastrointestinal:  Negative for abdominal pain, nausea and vomiting.   Neurological:  Negative for dizziness, focal weakness, headaches, light-headedness and numbness.   All other systems reviewed and are negative.        Current Outpatient Medications:     aspirin 81 MG EC tablet, Take 1 tablet by mouth Daily., Disp: , Rfl:     atorvastatin (LIPITOR) 80 MG tablet, Take 1 tablet by mouth once daily, Disp: 90 tablet, Rfl: 0    clopidogrel (PLAVIX) 75 MG tablet, Take 1 tablet by mouth once daily, Disp: 90 tablet, Rfl: 0    ezetimibe (ZETIA) 10 MG tablet, Take 1 tablet by mouth once daily, Disp: 90  tablet, Rfl: 0    HYDROcodone-acetaminophen (NORCO) 5-325 MG per tablet, Take 1 tablet by mouth Every 6 (Six) Hours As Needed for Moderate Pain for up to 10 doses., Disp: 10 tablet, Rfl: 0    lisinopril (PRINIVIL,ZESTRIL) 5 MG tablet, Take 1 tablet by mouth once daily, Disp: 90 tablet, Rfl: 0    nitroglycerin (Nitrostat) 0.4 MG SL tablet, Place 1 tablet under the tongue Every 5 (Five) Minutes As Needed for Chest Pain. Take no more than 3 doses in 15 minutes., Disp: 30 tablet, Rfl: 6    spironolactone (ALDACTONE) 25 MG tablet, Take 1 tablet by mouth Daily., Disp: 30 tablet, Rfl: 2    Vitamin D, Cholecalciferol, (CHOLECALCIFEROL) 10 MCG (400 UNIT) tablet, Take 1 tablet by mouth Daily., Disp: , Rfl:     metoprolol succinate XL (TOPROL-XL) 25 MG 24 hr tablet, Take 2 tablets by mouth Daily for 90 days., Disp: 90 tablet, Rfl: 2  No current facility-administered medications for this visit.    Facility-Administered Medications Ordered in Other Visits:     Chlorhexidine Gluconate Cloth 2 % pads 1 application, 1 application , Topical, Q12H PRN, Virgie Mejia APRN    Allergies   Allergen Reactions    Adhesive Tape Rash       Family History   Problem Relation Age of Onset    Diabetes Father     Heart failure Father        Past Surgical History:   Procedure Laterality Date    BIVENTRICULLAR IMPLANTABLE CARDIOVERTER DEFIBRILLATOR PLACEMENT N/A 10/27/2023    Procedure: Implant ICD - bi ventricular, [employer] EMAILED;  Surgeon: Kaushal Zamora MD;  Location: [facility] CATH INVASIVE LOCATION;  Service: Cardiovascular;  Laterality: N/A;    CARDIAC CATHETERIZATION  05/30/2015    CARDIAC CATHETERIZATION N/A 6/2/2023    Procedure: Left Heart Cath with Coronary Angiography;  Surgeon: Judson Baumann MD;  Location: [facility] CATH INVASIVE LOCATION;  Service: Cardiovascular;  Laterality: N/A;    CARDIAC CATHETERIZATION N/A 6/2/2023    Procedure: Right Heart Cath;  Surgeon: Judson Baumann MD;  Location: [facility] CATH INVASIVE LOCATION;   Service: Cardiovascular;  Laterality: N/A;    CARDIAC CATHETERIZATION  2023    Procedure: Functional Flow [facility] [place] [facility];  Surgeon: Yousif Dc MD;  Location: [facility] CATH INVASIVE LOCATION;  Service: Cardiovascular;;    COLONOSCOPY      CORONARY ANGIOPLASTY WITH STENT PLACEMENT  2015    stent lad and circumflex distal to the marginal branch    CORONARY ANGIOPLASTY WITH STENT PLACEMENT  2013    stent to lad    CORONARY ARTERY BYPASS GRAFT N/A 2023    Procedure: CORONARY ARTERY BYPASS GRAFTING, POSSIBLE IMPELLA PLACEMENT;  Surgeon: Pedro Monge MD;  Location: [facility] CVOR;  Service: Cardiothoracic;  Laterality: N/A;  CABG X 2 (1 vein graft, 1 JUDD graft), Off pump with perfusion assist.    INTERVENTIONAL RADIOLOGY PROCEDURE N/A 2023    Procedure: Intravascular Ultrasound;  Surgeon: Yousif Dc MD;  Location: [facility] CATH INVASIVE LOCATION;  Service: Cardiovascular;  Laterality: N/A;    TRANSESOPHAGEAL ECHOCARDIOGRAM (YESENIA) N/A 2023    Procedure: TRANSESOPHAGEAL ECHOCARDIOGRAM WITH ANESTHESIA;  Surgeon: Pedro Monge MD;  Location: [facility];  Service: Cardiothoracic;  Laterality: N/A;       Past Medical History:   Diagnosis Date    BBB (bundle branch block)     lt bundle branch block    CAD (coronary artery disease)     Cardiomyopathy     CHD (coronary heart disease)     Congenital heart disease     Heart murmur     Hyperlipidemia     Hypertension     Myocardial infarction 2023    acute    Sleep apnea        Family History   Problem Relation Age of Onset    Diabetes Father     Heart failure Father        Social History     Socioeconomic History    Marital status: Significant Other   Tobacco Use    Smoking status: Former     Packs/day: 0.00     Years: 15.00     Additional pack years: 0.00     Total pack years: 0.00     Types: Cigarettes     Quit date: 2023     Years since quittin.5     Passive exposure: Current    Smokeless tobacco: Never   Vaping Use    Vaping Use: Never  "used   Substance and Sexual Activity    Alcohol use: Not Currently    Drug use: Not Currently    Sexual activity: Defer         Procedures      Objective:       Physical Exam    /74   Pulse 66   Ht 171.5 cm (67.52\")   Wt 82.6 kg (182 lb)   SpO2 98%   BMI 28.07 kg/m²   The patient is alert, oriented and in no distress.    Vital signs as noted above.    Head and neck revealed no carotid bruits or jugular venous distension.  No thyromegaly or lymphadenopathy is present.    Lungs clear.  No wheezing.  Breath sounds are normal bilaterally.    Heart normal first and second heart sounds.  No murmur..  No pericardial rub is present.  No gallop is present.    Abdomen soft and nontender.  No organomegaly is present.    Extremities revealed good peripheral pulses without any pedal edema.    Skin warm and dry.  BiV ICD site looks normal.    Musculoskeletal system is grossly normal.    CNS grossly normal.    Reviewed and updated.        "

## 2023-11-10 ENCOUNTER — TELEPHONE (OUTPATIENT)
Dept: CARDIOLOGY | Facility: CLINIC | Age: 63
End: 2023-11-10
Payer: COMMERCIAL

## 2023-11-10 NOTE — TELEPHONE ENCOUNTER
The [employer] called to verify RTW date of 11/27/23. Confirmed.   They are needing light work duty restrictions specified for when he does come back.   Advised them to send us papers and we will update them.   Understood

## 2023-11-15 ENCOUNTER — TELEPHONE (OUTPATIENT)
Dept: CARDIAC REHAB | Facility: HOSPITAL | Age: 63
End: 2023-11-15
Payer: COMMERCIAL

## 2023-11-28 ENCOUNTER — TELEPHONE (OUTPATIENT)
Dept: CARDIOLOGY | Facility: CLINIC | Age: 63
End: 2023-11-28
Payer: COMMERCIAL

## 2023-11-28 NOTE — TELEPHONE ENCOUNTER
Spoke with patient - requesting RTW and STD papers be emailed to him so he can take to his employer.     Sent secure email with attachments

## 2023-11-28 NOTE — TELEPHONE ENCOUNTER
"Caller: Zia Lott \"Ric\"    Relationship: Self    Best call back number: [phone]    What form or medical record are you requesting: PAPERWORK    Who is requesting this form or medical record from you: JOB    Timeframe paperwork needed: ASAP    Additional notes: PT IS CALLING TO SPEAK WITH JEIMY ABOUT THE PAPERWORK SHE FILLED OUT FOR HIM TO RETURN TO WORK.    "

## 2023-12-05 ENCOUNTER — LAB (OUTPATIENT)
Dept: FAMILY MEDICINE CLINIC | Facility: CLINIC | Age: 63
End: 2023-12-05
Payer: MEDICARE

## 2023-12-05 DIAGNOSIS — Z13.29 THYROID DISORDER SCREEN: ICD-10-CM

## 2023-12-05 DIAGNOSIS — E55.9 VITAMIN D DEFICIENCY: ICD-10-CM

## 2023-12-05 DIAGNOSIS — E78.5 HYPERLIPIDEMIA, UNSPECIFIED HYPERLIPIDEMIA TYPE: ICD-10-CM

## 2023-12-05 DIAGNOSIS — I10 HYPERTENSION, UNSPECIFIED TYPE: ICD-10-CM

## 2023-12-05 DIAGNOSIS — R73.09 ELEVATED GLUCOSE: ICD-10-CM

## 2023-12-05 DIAGNOSIS — I10 PRIMARY HYPERTENSION: ICD-10-CM

## 2023-12-05 DIAGNOSIS — Z11.59 NEED FOR HEPATITIS C SCREENING TEST: ICD-10-CM

## 2023-12-05 DIAGNOSIS — E53.8 VITAMIN B12 DEFICIENCY: Primary | ICD-10-CM

## 2023-12-05 DIAGNOSIS — E11.9 TYPE 2 DIABETES MELLITUS WITHOUT COMPLICATION, UNSPECIFIED WHETHER LONG TERM INSULIN USE: ICD-10-CM

## 2023-12-05 LAB
25(OH)D3 SERPL-MCNC: 26.2 NG/ML (ref 30–100)
ALBUMIN SERPL-MCNC: 4 G/DL (ref 3.5–5.2)
ALBUMIN UR-MCNC: 2.5 MG/DL
ALBUMIN/GLOB SERPL: 1.5 G/DL
ALP SERPL-CCNC: 58 U/L (ref 39–117)
ALT SERPL W P-5'-P-CCNC: 19 U/L (ref 1–41)
ANION GAP SERPL CALCULATED.3IONS-SCNC: 10.1 MMOL/L (ref 5–15)
AST SERPL-CCNC: 20 U/L (ref 1–40)
BASOPHILS # BLD AUTO: 0.07 10*3/MM3 (ref 0–0.2)
BASOPHILS NFR BLD AUTO: 0.9 % (ref 0–1.5)
BILIRUB SERPL-MCNC: 0.6 MG/DL (ref 0–1.2)
BILIRUB UR QL STRIP: NEGATIVE
BUN SERPL-MCNC: 10 MG/DL (ref 8–23)
BUN/CREAT SERPL: 10.8 (ref 7–25)
CALCIUM SPEC-SCNC: 9.9 MG/DL (ref 8.6–10.5)
CHLORIDE SERPL-SCNC: 104 MMOL/L (ref 98–107)
CHOLEST SERPL-MCNC: 127 MG/DL (ref 0–200)
CLARITY UR: CLEAR
CO2 SERPL-SCNC: 26.9 MMOL/L (ref 22–29)
COLOR UR: ABNORMAL
CREAT SERPL-MCNC: 0.93 MG/DL (ref 0.76–1.27)
CREAT UR-MCNC: 258.9 MG/DL
DEPRECATED RDW RBC AUTO: 40.3 FL (ref 37–54)
EGFRCR SERPLBLD CKD-EPI 2021: 91.7 ML/MIN/1.73
EOSINOPHIL # BLD AUTO: 0.24 10*3/MM3 (ref 0–0.4)
EOSINOPHIL NFR BLD AUTO: 3.2 % (ref 0.3–6.2)
ERYTHROCYTE [DISTWIDTH] IN BLOOD BY AUTOMATED COUNT: 12.3 % (ref 12.3–15.4)
GLOBULIN UR ELPH-MCNC: 2.7 GM/DL
GLUCOSE SERPL-MCNC: 96 MG/DL (ref 65–99)
GLUCOSE UR STRIP-MCNC: NEGATIVE MG/DL
HBA1C MFR BLD: 5.9 % (ref 4.8–5.6)
HCT VFR BLD AUTO: 42.8 % (ref 37.5–51)
HCV AB SER DONR QL: NORMAL
HDLC SERPL-MCNC: 46 MG/DL (ref 40–60)
HGB BLD-MCNC: 14.2 G/DL (ref 13–17.7)
HGB UR QL STRIP.AUTO: NEGATIVE
HOLD SPECIMEN: NORMAL
IMM GRANULOCYTES # BLD AUTO: 0.02 10*3/MM3 (ref 0–0.05)
IMM GRANULOCYTES NFR BLD AUTO: 0.3 % (ref 0–0.5)
KETONES UR QL STRIP: ABNORMAL
LDLC SERPL CALC-MCNC: 59 MG/DL (ref 0–100)
LDLC/HDLC SERPL: 1.24 {RATIO}
LEUKOCYTE ESTERASE UR QL STRIP.AUTO: NEGATIVE
LYMPHOCYTES # BLD AUTO: 2.3 10*3/MM3 (ref 0.7–3.1)
LYMPHOCYTES NFR BLD AUTO: 30.9 % (ref 19.6–45.3)
MCH RBC QN AUTO: 29.8 PG (ref 26.6–33)
MCHC RBC AUTO-ENTMCNC: 33.2 G/DL (ref 31.5–35.7)
MCV RBC AUTO: 89.7 FL (ref 79–97)
MICROALBUMIN/CREAT UR: 9.7 MG/G (ref 0–29)
MONOCYTES # BLD AUTO: 0.63 10*3/MM3 (ref 0.1–0.9)
MONOCYTES NFR BLD AUTO: 8.5 % (ref 5–12)
NEUTROPHILS NFR BLD AUTO: 4.18 10*3/MM3 (ref 1.7–7)
NEUTROPHILS NFR BLD AUTO: 56.2 % (ref 42.7–76)
NITRITE UR QL STRIP: NEGATIVE
NRBC BLD AUTO-RTO: 0 /100 WBC (ref 0–0.2)
PH UR STRIP.AUTO: 5.5 [PH] (ref 5–8)
PLATELET # BLD AUTO: 217 10*3/MM3 (ref 140–450)
PMV BLD AUTO: 10.1 FL (ref 6–12)
POTASSIUM SERPL-SCNC: 4.6 MMOL/L (ref 3.5–5.2)
PROT SERPL-MCNC: 6.7 G/DL (ref 6–8.5)
PROT UR QL STRIP: NEGATIVE
RBC # BLD AUTO: 4.77 10*6/MM3 (ref 4.14–5.8)
SODIUM SERPL-SCNC: 141 MMOL/L (ref 136–145)
SP GR UR STRIP: 1.02 (ref 1–1.03)
TRIGL SERPL-MCNC: 120 MG/DL (ref 0–150)
TSH SERPL DL<=0.05 MIU/L-ACNC: 3.09 UIU/ML (ref 0.27–4.2)
UROBILINOGEN UR QL STRIP: ABNORMAL
VIT B12 BLD-MCNC: 400 PG/ML (ref 211–946)
VLDLC SERPL-MCNC: 22 MG/DL (ref 5–40)
WBC NRBC COR # BLD AUTO: 7.44 10*3/MM3 (ref 3.4–10.8)

## 2023-12-05 PROCEDURE — 80053 COMPREHEN METABOLIC PANEL: CPT | Performed by: FAMILY MEDICINE

## 2023-12-05 PROCEDURE — 82570 ASSAY OF URINE CREATININE: CPT | Performed by: FAMILY MEDICINE

## 2023-12-05 PROCEDURE — 86803 HEPATITIS C AB TEST: CPT | Performed by: FAMILY MEDICINE

## 2023-12-05 PROCEDURE — 80061 LIPID PANEL: CPT | Performed by: FAMILY MEDICINE

## 2023-12-05 PROCEDURE — 81003 URINALYSIS AUTO W/O SCOPE: CPT | Performed by: FAMILY MEDICINE

## 2023-12-05 PROCEDURE — 82306 VITAMIN D 25 HYDROXY: CPT | Performed by: FAMILY MEDICINE

## 2023-12-05 PROCEDURE — 36415 COLL VENOUS BLD VENIPUNCTURE: CPT

## 2023-12-05 PROCEDURE — 83036 HEMOGLOBIN GLYCOSYLATED A1C: CPT | Performed by: FAMILY MEDICINE

## 2023-12-05 PROCEDURE — 82043 UR ALBUMIN QUANTITATIVE: CPT | Performed by: FAMILY MEDICINE

## 2023-12-05 PROCEDURE — 85025 COMPLETE CBC W/AUTO DIFF WBC: CPT | Performed by: FAMILY MEDICINE

## 2023-12-05 PROCEDURE — 84443 ASSAY THYROID STIM HORMONE: CPT | Performed by: FAMILY MEDICINE

## 2023-12-05 PROCEDURE — 82607 VITAMIN B-12: CPT | Performed by: FAMILY MEDICINE

## 2023-12-06 RX ORDER — ERGOCALCIFEROL 1.25 MG/1
50000 CAPSULE ORAL
Qty: 12 CAPSULE | Refills: 1 | Status: SHIPPED | OUTPATIENT
Start: 2023-12-06 | End: 2024-02-22

## 2023-12-06 NOTE — PROGRESS NOTES
Spoke with Zia Lott regarding results. They expressed understanding.    He will also discuss further at next appt.

## 2023-12-11 ENCOUNTER — OFFICE VISIT (OUTPATIENT)
Dept: FAMILY MEDICINE CLINIC | Facility: CLINIC | Age: 63
End: 2023-12-11
Payer: MEDICARE

## 2023-12-11 VITALS
RESPIRATION RATE: 16 BRPM | HEIGHT: 68 IN | WEIGHT: 188 LBS | SYSTOLIC BLOOD PRESSURE: 118 MMHG | OXYGEN SATURATION: 99 % | HEART RATE: 64 BPM | BODY MASS INDEX: 28.49 KG/M2 | DIASTOLIC BLOOD PRESSURE: 72 MMHG

## 2023-12-11 DIAGNOSIS — M75.52 ACUTE BURSITIS OF LEFT SHOULDER: ICD-10-CM

## 2023-12-11 DIAGNOSIS — F41.1 GAD (GENERALIZED ANXIETY DISORDER): ICD-10-CM

## 2023-12-11 DIAGNOSIS — I25.10 CHRONIC CORONARY ARTERY DISEASE: ICD-10-CM

## 2023-12-11 DIAGNOSIS — Z00.00 MEDICARE ANNUAL WELLNESS VISIT, INITIAL: Primary | ICD-10-CM

## 2023-12-11 DIAGNOSIS — E78.2 MIXED HYPERLIPIDEMIA: ICD-10-CM

## 2023-12-11 DIAGNOSIS — Z95.810 PRESENCE OF BIVENTRICULAR IMPLANTABLE CARDIOVERTER-DEFIBRILLATOR (ICD): ICD-10-CM

## 2023-12-11 DIAGNOSIS — I21.02 ST ELEVATION MYOCARDIAL INFARCTION INVOLVING LEFT ANTERIOR DESCENDING (LAD) CORONARY ARTERY: ICD-10-CM

## 2023-12-11 DIAGNOSIS — I44.7 LBBB (LEFT BUNDLE BRANCH BLOCK): ICD-10-CM

## 2023-12-11 DIAGNOSIS — Z95.820 STATUS POST ANGIOPLASTY WITH STENT: ICD-10-CM

## 2023-12-11 DIAGNOSIS — I10 PRIMARY HYPERTENSION: ICD-10-CM

## 2023-12-11 RX ORDER — DEXAMETHASONE 4 MG/1
TABLET ORAL
Qty: 20 TABLET | Refills: 0 | Status: SHIPPED | OUTPATIENT
Start: 2023-12-11 | End: 2023-12-23

## 2023-12-11 NOTE — PROGRESS NOTES
The ABCs of the Annual Wellness Visit  [facility]    Subjective     Zia Lott is a 62 y.o. male who presents for a  Welcome to Medicare Visit.    The following portions of the patient's history were reviewed and   updated as appropriate: allergies, current medications, past family history, past medical history, past social history, past surgical history, and problem list.     Compared to one year ago, the patient feels his physical   health is the same.    Compared to one year ago, the patient feels his mental   health is the same.    Recent Hospitalizations:  This patient has had a [facility] admission record on file within the last 365 days.    Current Medical Providers:  Patient Care Team:  Bryan Beckham MD as PCP - General  Judson Baumann MD as Consulting Physician (Cardiology)  Pedro Monge MD as Surgeon (Cardiothoracic Surgery)  Kaushal Zamora MD as Consulting Physician (Cardiology)    Outpatient Medications Prior to Visit   Medication Sig Dispense Refill    aspirin 81 MG EC tablet Take 1 tablet by mouth Daily.      atorvastatin (LIPITOR) 80 MG tablet Take 1 tablet by mouth once daily 90 tablet 0    clopidogrel (PLAVIX) 75 MG tablet Take 1 tablet by mouth once daily 90 tablet 0    ezetimibe (ZETIA) 10 MG tablet Take 1 tablet by mouth once daily 90 tablet 0    lisinopril (PRINIVIL,ZESTRIL) 5 MG tablet Take 1 tablet by mouth once daily 90 tablet 0    metoprolol succinate XL (TOPROL-XL) 25 MG 24 hr tablet Take 2 tablets by mouth Daily for 90 days. 90 tablet 2    nitroglycerin (Nitrostat) 0.4 MG SL tablet Place 1 tablet under the tongue Every 5 (Five) Minutes As Needed for Chest Pain. Take no more than 3 doses in 15 minutes. 30 tablet 6    vitamin D (ERGOCALCIFEROL) 1.25 MG (98317 UT) capsule capsule Take 1 capsule by mouth Every 7 (Seven) Days for 12 doses. 12 capsule 1    Vitamin D, Cholecalciferol, (CHOLECALCIFEROL) 10 MCG (400 UNIT) tablet Take 1 tablet by mouth  Daily.      HYDROcodone-acetaminophen (NORCO) 5-325 MG per tablet Take 1 tablet by mouth Every 6 (Six) Hours As Needed for Moderate Pain for up to 10 doses. (Patient not taking: Reported on 12/11/2023) 10 tablet 0    spironolactone (ALDACTONE) 25 MG tablet Take 1 tablet by mouth Daily. (Patient not taking: Reported on 12/11/2023) 30 tablet 2     Facility-Administered Medications Prior to Visit   Medication Dose Route Frequency Provider Last Rate Last Admin    Chlorhexidine Gluconate Cloth 2 % pads 1 application  1 application  Topical Q12H PRN Virgie Mejia SLAKESHIA           No opioid medication identified on active medication list. I have reviewed chart for other potential  high risk medication/s and harmful drug interactions in the elderly.        Aspirin is on active medication list. Aspirin use is indicated based on review of current medical condition/s. Pros and cons of this therapy have been discussed today. Benefits of this medication outweigh potential harm.  Patient has been encouraged to continue taking this medication.  .      Patient Active Problem List   Diagnosis    Cardiomyopathy, ischemic    Chronic coronary artery disease    Hyperlipidemia    Hypertension    Status post percutaneous transluminal coronary angioplasty    ST elevation (STEMI) myocardial infarction    Stress at work    DORA (generalized anxiety disorder)    Medicare annual wellness visit, initial    Status post angioplasty with stent    LBBB (left bundle branch block)    Acute on chronic systolic heart failure    Chronic systolic CHF (congestive heart failure)    Presence of biventricular implantable cardioverter-defibrillator (ICD)    Acute bursitis of left shoulder     Advance Care Planning   Advance Care Planning     Advance Directive is on file.  ACP discussion was held with the patient during this visit. Patient has an advance directive in EMR which is still valid.        Objective   Vitals:    12/11/23 1536   BP: 118/72   Pulse: 64  "  Resp: 16   SpO2: 99%   Weight: 85.3 kg (188 lb)   Height: 171.5 cm (67.52\")     Estimated body mass index is 28.99 kg/m² as calculated from the following:    Height as of this encounter: 171.5 cm (67.52\").    Weight as of this encounter: 85.3 kg (188 lb).    BMI is >= 25 and <30. (Overweight) The following options were offered after discussion;: weight loss educational material (shared in after visit summary)      Does the patient have evidence of cognitive impairment?   No    Lab Results   Component Value Date    TRIG 120 2023    HDL 46 2023    LDL 59 2023    VLDL 22 2023    HGBA1C 5.90 (H) 2023       Procedures       HEALTH RISK ASSESSMENT    Smoking Status:  Social History     Tobacco Use   Smoking Status Former    Packs/day: 0.00    Years: 15.00    Additional pack years: 0.00    Total pack years: 0.00    Types: Cigarettes    Quit date: 2023    Years since quittin.6    Passive exposure: Current   Smokeless Tobacco Never     Alcohol Consumption:  Social History     Substance and Sexual Activity   Alcohol Use Not Currently       Fall Risk Screen:    MARA Fall Risk Assessment was completed, and patient is at LOW risk for falls.Assessment completed on:2023    Depression Screen:       2023     3:34 PM   PHQ-2/PHQ-9 Depression Screening   Little Interest or Pleasure in Doing Things 0-->not at all   Feeling Down, Depressed or Hopeless 0-->not at all   PHQ-9: Brief Depression Severity Measure Score 0       Health Habits and Functional and Cognitive Screenin/11/2023     3:34 PM   Functional & Cognitive Status   Do you have difficulty preparing food and eating? No   Do you have difficulty bathing yourself, getting dressed or grooming yourself? No   Do you have difficulty using the toilet? No   Do you have difficulty moving around from place to place? No   Do you have trouble with steps or getting out of a bed or a chair? No   Current Diet Limited Junk Food "   Dental Exam Not up to date   Eye Exam Up to date   Exercise (times per week) 3 times per week   Current Exercises Include Cardiovascular Workout;Light Weights;Walking;House Cleaning   Do you need help using the phone?  No   Are you deaf or do you have serious difficulty hearing?  No   Do you need help to go to places out of walking distance? No   Do you need help shopping? No   Do you need help preparing meals?  No   Do you need help with housework?  No   Do you need help with laundry? No   Do you need help taking your medications? No   Do you need help managing money? No   Do you ever drive or ride in a car without wearing a seat belt? No       Visual Acuity:    Vision Screening    Right eye Left eye Both eyes   Without correction      With correction 20/25 20/25 20/20       Age-appropriate Screening Schedule:  Refer to the list below for future screening recommendations based on patient's age, sex and/or medical conditions. Orders for these recommended tests are listed in the plan section. The patient has been provided with a written plan.    Health Maintenance   Topic Date Due    Pneumococcal Vaccine 0-64 (1 - PCV) Never done    TDAP/TD VACCINES (1 - Tdap) Never done    ZOSTER VACCINE (1 of 2) Never done    DIABETIC FOOT EXAM  Never done    DIABETIC EYE EXAM  Never done    COVID-19 Vaccine (4 - 2023-24 season) 03/01/2024 (Originally 9/1/2023)    INFLUENZA VACCINE  03/31/2024 (Originally 8/1/2023)    HEMOGLOBIN A1C  06/05/2024    LIPID PANEL  12/05/2024    URINE MICROALBUMIN  12/05/2024    ANNUAL WELLNESS VISIT  12/11/2024    BMI FOLLOWUP  12/11/2024    COLORECTAL CANCER SCREENING  10/21/2031    HEPATITIS C SCREENING  Completed        CMS Preventative Services Quick Reference  Risk Factors Identified During Encounter    None Identified  The above risks/problems have been discussed with the patient.  Pertinent information has been shared with the patient in the After Visit Summary.    Follow Up:   Initial  "Medicare Visit in one year    An After Visit Summary and PPPS were made available to the patient.      Additional E&M Note during same encounter follows:  Patient has multiple medical problems which are significant and separately identifiable that require additional work above and beyond the Medicare Wellness Visit.      Chief Complaint  Welcome To Medicare    Subjective        HPI  Zia Lott is also being seen today for a Medicare wellness visit. He is accompanied by an adult female.    The patient is doing well overall. He had a defibrillator and pacemaker placed on 10/27/2023. He had a bypass in 02/2023. He was in the [facility] and stayed in the house for 4 to 5 days. He had open heart surgery in 06/2023. He states that he had a pacemaker and a defibrillator placed in 10/2023. This patient's ejection fraction was not where they should have been. He had 3 bypass surgeries. His rhythm was not well controlled. He was told that his heart was doing this instead of his left ventricle. He got a thing from the insurance company stating that they were not going to pay for his pacemaker. He does not see his cardiologist until 02/2024.    The patient's left shoulder is causing him discomfort. He is not sure if he has arthritis in his left hand. He has a dull pain in his left shoulder. He can hardly lift his left shoulder. He can do some things, but he cannot bring it up to the side. The patient voices the discomfort started in between his 2 surgeries. He has returned to work, and he must watch the activities he performs. His finger does not \"lock up\". He has discomfort when he bends his left hand. It is sore to touch and does throb at night. He must take Tylenol. He is taking a blood thinner. He denies any fevers.    The patient had his last colonoscopy on 10/21/2021. He has had polyps removed.    The patient had an eye examination within the last year. He does not see a dentist. He does not see a " "dermatologist.    The patient does not get his influenza vaccine every year. He has gotten an influenza vaccination in the past and he became ill afterwards. He has never had it since then. He has not had Prevnar or Pneumovax. He has not had a tetanus booster vaccine. He has not had the shingles vaccine. He has received 3 doses of the COVID-19 vaccine. He has contracted the COVID-19 virus in the past.    The patient voices that he received only 4 of the 12 doses of Vitamin D called in to the pharmacy.  He is unsure of the strength.         Objective   Vital Signs:  /72   Pulse 64   Resp 16   Ht 171.5 cm (67.52\")   Wt 85.3 kg (188 lb)   SpO2 99%   BMI 28.99 kg/m²     Physical Exam  Constitutional:       Appearance: Normal appearance. He is well-developed and normal weight.   HENT:      Head: Normocephalic and atraumatic.      Right Ear: Tympanic membrane, ear canal and external ear normal.      Left Ear: Tympanic membrane, ear canal and external ear normal.      Nose: Nose normal.      Mouth/Throat:      Mouth: Mucous membranes are moist.      Pharynx: Oropharynx is clear. No oropharyngeal exudate.   Eyes:      Extraocular Movements: Extraocular movements intact.      Conjunctiva/sclera: Conjunctivae normal.      Pupils: Pupils are equal, round, and reactive to light.   Cardiovascular:      Rate and Rhythm: Normal rate and regular rhythm.      Pulses: Normal pulses.      Heart sounds: Normal heart sounds.   Pulmonary:      Effort: Pulmonary effort is normal.      Breath sounds: Normal breath sounds.      Comments: Left upper chest wall with a palpable ICD pacemaker combination under the skin.  Chest is clear to auscultation  Abdominal:      General: Bowel sounds are normal.      Palpations: Abdomen is soft.   Musculoskeletal:         General: Normal range of motion.      Cervical back: Normal range of motion and neck supple.      Comments: Very tender left shoulder anteriorly and laterally.  He may have " an effusion but it is difficult to tell.  He cannot abduct it more than about 60 degrees laterally and he cannot flex it or extend it above 90 degrees.  Internal rate rotation is very limited external rotation is to 45 degrees   Skin:     General: Skin is warm and dry.   Neurological:      General: No focal deficit present.      Mental Status: He is alert and oriented to person, place, and time. Mental status is at baseline.   Psychiatric:         Mood and Affect: Mood normal.         Behavior: Behavior normal.         Thought Content: Thought content normal.         Judgment: Judgment normal.                      Assessment and Plan   1. Medicare annual wellness visit, subsequent  - Upon arrival to the room the patient underwent the Medicare health risk assessment.  Neither the questions themselves or the answers that were given prompted any major concern on the part of the patient or by the medical staff that gave the assessment.  As far as the preventative care examinations and the preventative care immunizations that this patient requires they are as listed below.   Screening tests recommended:    Colonoscopy -utd  eye exam-utd  foot exam-utds  PSA-utd  Dentist-needs to get  Derm-not needed  Cardiology- 3-4x/day  [facility]- utd  Immunization:  Influenza- refuses/  reconsider as he nears 68  Prevnar- will get prevnar 20 next year  Pneumovax-will get next year  Tetanus- needs updated  Shingles vaccine- consider getting after 65  Hepatitis - up to date  Covid  - utd                 2.  Chronic coronary artery disease.  -Mr. Lott has had coronary artery disease for many years. He recently had another MI that left him with more injury and eventually he had coronary artery stent placed with angioplasty.     3.   Status post angioplasty with stent placement.     4.  ST elevation MI involving left anterior descending artery-Stable.     5.  Biventricular implantable cardioverter- Defibrillator.    6.  Left bundle branch  block.    7.  Primary hypertension.  - Patient's blood pressure today was 118/72 mmHg. So, we will continue with current medicines.     8.   Mixed hyperlipidemia.   - The patient's lipid panel supports the use of a statin. We also have him on Zetia and atorvastatin 80 mg.  His lipid panel is in a very excellent range. We will continue both medications for now.     9.  Generalized anxiety.   - The patient carries quite a bit of anxiety and always has. He is not currently taking any medicines for his anxiety but if it gets out of control, we usually use a benzodiazepine of some sort. Right now, we'll just not planning on using anything and he seems to be doing quite well.    10.  Acute bursitis of the left shoulder  - The patient has a very tender left shoulder. I think he has got some fluid in the subacromial bursa, and he has a very limited range of motion with abduction and flexion and extension. His rotation is severely limited. I'm going to give him some dexamethasone and we're going to see how he does with this in the next couple of weeks. As soon as the shoulder feels better from the dexamethasone, I want him to. Start doing range of motion. I want him to put heat on it and see if we can get the shoulder moving again. If he can't, we're going to need to inject it someday and possibly force the matter.           Follow Up   Return in about 6 months (around 6/11/2024), or if symptoms worsen or fail to improve, for Recheck.  Patient was given instructions and counseling regarding his condition or for health maintenance advice. Please see specific information pulled into the AVS if appropriate.           Transcribed from ambient dictation for Bryan Beckham MD by Gale Lucas.  12/11/23   20:52 EST    Patient or patient representative verbalized consent to the visit recording.  I have personally performed the services described in this document as transcribed by the above individual, and it is both accurate  and complete.  Bryan Beckham MD  12/12/2023  09:06 EST

## 2023-12-12 ENCOUNTER — TELEPHONE (OUTPATIENT)
Dept: CARDIOLOGY | Facility: CLINIC | Age: 63
End: 2023-12-12
Payer: COMMERCIAL

## 2023-12-12 NOTE — TELEPHONE ENCOUNTER
"  Caller: Zia Lott \"Ric\"    Relationship: Self    Best call back number:  [phone]      PATIENT STATES THAT HE WAS APPROVED FOR THE PACEMAKER, BUT AFTER THE PROCEDURE WAS DONE, THEY SENT HIM A LETTER STATING THAT IT WAS NOT COVERED OR MEDICALLY NECESSARY. HE WOULD LIKE TO SPEAK TO SOMEONE IN REGARDS TO THIS.  "

## 2023-12-27 DIAGNOSIS — Z56.6 STRESS AT WORK: ICD-10-CM

## 2023-12-27 DIAGNOSIS — Z12.5 SPECIAL SCREENING FOR MALIGNANT NEOPLASM OF PROSTATE: ICD-10-CM

## 2023-12-27 DIAGNOSIS — F17.210 CIGARETTE SMOKER: ICD-10-CM

## 2023-12-27 DIAGNOSIS — I25.10 CHRONIC CORONARY ARTERY DISEASE: ICD-10-CM

## 2023-12-27 DIAGNOSIS — E78.2 MIXED HYPERLIPIDEMIA: ICD-10-CM

## 2023-12-27 DIAGNOSIS — I10 PRIMARY HYPERTENSION: ICD-10-CM

## 2023-12-27 DIAGNOSIS — Z98.61 STATUS POST PERCUTANEOUS TRANSLUMINAL CORONARY ANGIOPLASTY: ICD-10-CM

## 2023-12-27 RX ORDER — DEXAMETHASONE 4 MG/1
TABLET ORAL
Qty: 20 TABLET | Refills: 0 | Status: SHIPPED | OUTPATIENT
Start: 2023-12-27 | End: 2024-01-07

## 2023-12-27 RX ORDER — LISINOPRIL 5 MG/1
5 TABLET ORAL DAILY
Qty: 90 TABLET | Refills: 0 | Status: SHIPPED | OUTPATIENT
Start: 2023-12-27

## 2023-12-27 RX ORDER — ATORVASTATIN CALCIUM 80 MG/1
80 TABLET, FILM COATED ORAL DAILY
Qty: 90 TABLET | Refills: 0 | Status: SHIPPED | OUTPATIENT
Start: 2023-12-27

## 2023-12-27 RX ORDER — EZETIMIBE 10 MG/1
10 TABLET ORAL DAILY
Qty: 90 TABLET | Refills: 0 | Status: SHIPPED | OUTPATIENT
Start: 2023-12-27

## 2023-12-27 RX ORDER — METOPROLOL SUCCINATE 25 MG/1
50 TABLET, EXTENDED RELEASE ORAL DAILY
Qty: 90 TABLET | Refills: 2 | Status: SHIPPED | OUTPATIENT
Start: 2023-12-27 | End: 2024-03-26

## 2023-12-27 RX ORDER — CLOPIDOGREL BISULFATE 75 MG/1
75 TABLET ORAL DAILY
Qty: 90 TABLET | Refills: 0 | Status: SHIPPED | OUTPATIENT
Start: 2023-12-27

## 2023-12-27 SDOH — HEALTH STABILITY - MENTAL HEALTH: OTHER PHYSICAL AND MENTAL STRAIN RELATED TO WORK: Z56.6

## 2024-01-19 DIAGNOSIS — E78.2 MIXED HYPERLIPIDEMIA: ICD-10-CM

## 2024-01-19 DIAGNOSIS — Z12.5 SPECIAL SCREENING FOR MALIGNANT NEOPLASM OF PROSTATE: ICD-10-CM

## 2024-01-19 DIAGNOSIS — I25.10 CHRONIC CORONARY ARTERY DISEASE: ICD-10-CM

## 2024-01-19 DIAGNOSIS — Z56.6 STRESS AT WORK: ICD-10-CM

## 2024-01-19 DIAGNOSIS — F17.210 CIGARETTE SMOKER: ICD-10-CM

## 2024-01-19 DIAGNOSIS — I10 PRIMARY HYPERTENSION: ICD-10-CM

## 2024-01-19 DIAGNOSIS — Z98.61 STATUS POST PERCUTANEOUS TRANSLUMINAL CORONARY ANGIOPLASTY: ICD-10-CM

## 2024-01-19 RX ORDER — ATORVASTATIN CALCIUM 80 MG/1
80 TABLET, FILM COATED ORAL DAILY
Qty: 90 TABLET | Refills: 0 | Status: SHIPPED | OUTPATIENT
Start: 2024-01-19

## 2024-01-19 SDOH — HEALTH STABILITY - MENTAL HEALTH: OTHER PHYSICAL AND MENTAL STRAIN RELATED TO WORK: Z56.6

## 2024-02-15 ENCOUNTER — CLINICAL SUPPORT NO REQUIREMENTS (OUTPATIENT)
Dept: CARDIOLOGY | Facility: CLINIC | Age: 64
End: 2024-02-15
Payer: MEDICARE

## 2024-02-15 ENCOUNTER — OFFICE VISIT (OUTPATIENT)
Dept: CARDIOLOGY | Facility: CLINIC | Age: 64
End: 2024-02-15
Payer: MEDICARE

## 2024-02-15 VITALS
SYSTOLIC BLOOD PRESSURE: 120 MMHG | BODY MASS INDEX: 30.76 KG/M2 | HEART RATE: 77 BPM | OXYGEN SATURATION: 97 % | DIASTOLIC BLOOD PRESSURE: 76 MMHG | WEIGHT: 196 LBS | HEIGHT: 67 IN

## 2024-02-15 DIAGNOSIS — I50.22 CHRONIC SYSTOLIC CHF (CONGESTIVE HEART FAILURE): ICD-10-CM

## 2024-02-15 DIAGNOSIS — E78.5 DYSLIPIDEMIA: ICD-10-CM

## 2024-02-15 DIAGNOSIS — I25.5 CARDIOMYOPATHY, ISCHEMIC: Primary | ICD-10-CM

## 2024-02-15 DIAGNOSIS — I25.10 CHRONIC CORONARY ARTERY DISEASE: ICD-10-CM

## 2024-02-15 DIAGNOSIS — Z95.820 STATUS POST ANGIOPLASTY WITH STENT: ICD-10-CM

## 2024-02-15 DIAGNOSIS — I10 ESSENTIAL HYPERTENSION: ICD-10-CM

## 2024-02-15 DIAGNOSIS — Z95.1 HX OF CABG: ICD-10-CM

## 2024-02-15 DIAGNOSIS — Z95.810 PRESENCE OF BIVENTRICULAR IMPLANTABLE CARDIOVERTER-DEFIBRILLATOR (ICD): ICD-10-CM

## 2024-02-15 DIAGNOSIS — I50.23 ACUTE ON CHRONIC SYSTOLIC HEART FAILURE: ICD-10-CM

## 2024-02-15 DIAGNOSIS — I25.5 ISCHEMIC CARDIOMYOPATHY: ICD-10-CM

## 2024-02-15 DIAGNOSIS — I44.7 LBBB (LEFT BUNDLE BRANCH BLOCK): ICD-10-CM

## 2024-02-16 NOTE — TELEPHONE ENCOUNTER
Patient needed testing, pt denied due to cost.   Phone note to Dr. Baumann requesting clearance, given.   Letter on Dr. Baumann's desk to sign.    yes

## 2024-03-29 DIAGNOSIS — I10 PRIMARY HYPERTENSION: ICD-10-CM

## 2024-03-29 DIAGNOSIS — Z12.5 SPECIAL SCREENING FOR MALIGNANT NEOPLASM OF PROSTATE: ICD-10-CM

## 2024-03-29 DIAGNOSIS — Z98.61 STATUS POST PERCUTANEOUS TRANSLUMINAL CORONARY ANGIOPLASTY: ICD-10-CM

## 2024-03-29 DIAGNOSIS — Z56.6 STRESS AT WORK: ICD-10-CM

## 2024-03-29 DIAGNOSIS — E78.2 MIXED HYPERLIPIDEMIA: ICD-10-CM

## 2024-03-29 DIAGNOSIS — F17.210 CIGARETTE SMOKER: ICD-10-CM

## 2024-03-29 DIAGNOSIS — I25.10 CHRONIC CORONARY ARTERY DISEASE: ICD-10-CM

## 2024-03-29 RX ORDER — EZETIMIBE 10 MG/1
10 TABLET ORAL DAILY
Qty: 90 TABLET | Refills: 0 | Status: SHIPPED | OUTPATIENT
Start: 2024-03-29

## 2024-03-29 RX ORDER — LISINOPRIL 5 MG/1
5 TABLET ORAL DAILY
Qty: 90 TABLET | Refills: 0 | Status: SHIPPED | OUTPATIENT
Start: 2024-03-29

## 2024-03-29 SDOH — HEALTH STABILITY - MENTAL HEALTH: OTHER PHYSICAL AND MENTAL STRAIN RELATED TO WORK: Z56.6

## 2024-04-09 DIAGNOSIS — Z12.5 SPECIAL SCREENING FOR MALIGNANT NEOPLASM OF PROSTATE: ICD-10-CM

## 2024-04-09 DIAGNOSIS — F17.210 CIGARETTE SMOKER: ICD-10-CM

## 2024-04-09 DIAGNOSIS — I10 PRIMARY HYPERTENSION: ICD-10-CM

## 2024-04-09 DIAGNOSIS — Z56.6 STRESS AT WORK: ICD-10-CM

## 2024-04-09 DIAGNOSIS — Z98.61 STATUS POST PERCUTANEOUS TRANSLUMINAL CORONARY ANGIOPLASTY: ICD-10-CM

## 2024-04-09 DIAGNOSIS — I25.10 CHRONIC CORONARY ARTERY DISEASE: ICD-10-CM

## 2024-04-09 DIAGNOSIS — E78.2 MIXED HYPERLIPIDEMIA: ICD-10-CM

## 2024-04-09 RX ORDER — ATORVASTATIN CALCIUM 80 MG/1
80 TABLET, FILM COATED ORAL DAILY
Qty: 90 TABLET | Refills: 0 | Status: SHIPPED | OUTPATIENT
Start: 2024-04-09

## 2024-04-09 RX ORDER — CLOPIDOGREL BISULFATE 75 MG/1
75 TABLET ORAL DAILY
Qty: 90 TABLET | Refills: 0 | Status: SHIPPED | OUTPATIENT
Start: 2024-04-09

## 2024-04-09 SDOH — HEALTH STABILITY - MENTAL HEALTH: OTHER PHYSICAL AND MENTAL STRAIN RELATED TO WORK: Z56.6

## 2024-04-16 DIAGNOSIS — I10 PRIMARY HYPERTENSION: ICD-10-CM

## 2024-04-16 DIAGNOSIS — Z56.6 STRESS AT WORK: ICD-10-CM

## 2024-04-16 DIAGNOSIS — F17.210 CIGARETTE SMOKER: ICD-10-CM

## 2024-04-16 DIAGNOSIS — Z12.5 SPECIAL SCREENING FOR MALIGNANT NEOPLASM OF PROSTATE: ICD-10-CM

## 2024-04-16 DIAGNOSIS — E78.2 MIXED HYPERLIPIDEMIA: ICD-10-CM

## 2024-04-16 DIAGNOSIS — Z98.61 STATUS POST PERCUTANEOUS TRANSLUMINAL CORONARY ANGIOPLASTY: ICD-10-CM

## 2024-04-16 DIAGNOSIS — I25.10 CHRONIC CORONARY ARTERY DISEASE: ICD-10-CM

## 2024-04-16 RX ORDER — METOPROLOL SUCCINATE 25 MG/1
50 TABLET, EXTENDED RELEASE ORAL DAILY
Qty: 90 TABLET | Refills: 0 | Status: SHIPPED | OUTPATIENT
Start: 2024-04-16 | End: 2024-07-15

## 2024-04-16 SDOH — HEALTH STABILITY - MENTAL HEALTH: OTHER PHYSICAL AND MENTAL STRAIN RELATED TO WORK: Z56.6

## 2024-04-16 NOTE — TELEPHONE ENCOUNTER
Incoming Refill Request      Medication requested (name and dose):     metoprolol succinate XL (TOPROL-XL) 25 MG 24 hr tablet ()  50 mg, Daily       Pharmacy where request should be sent: [facility]/pharmacy #[id number] - [address] - [phone]  - [phone]  [phone]     Additional details provided by patient: NONE    Best call back number: [phone]    Does the patient have less than a 3 day supply:  [x] Yes  [] No    Elizabeth Akins Rep  24, 14:59 EDT

## 2024-05-10 RX ORDER — ERGOCALCIFEROL 1.25 MG/1
50000 CAPSULE ORAL
Qty: 12 CAPSULE | Refills: 1 | Status: SHIPPED | OUTPATIENT
Start: 2024-05-10 | End: 2024-10-19

## 2024-05-10 RX ORDER — ERGOCALCIFEROL 1.25 MG/1
50000 CAPSULE ORAL
Qty: 12 CAPSULE | Refills: 1 | Status: CANCELLED | OUTPATIENT
Start: 2024-05-10 | End: 2024-07-27

## 2024-06-13 NOTE — TELEPHONE ENCOUNTER
"Caller: Zia Lott \"Ric\"    Relationship: Self    Best call back number: [phone]     Who are you requesting to speak with (clinical staff, provider,  specific staff member): JEIMY    What was the call regarding: PT STATES HE NEEDS TO SPEAK WITH JEIMY AS HE IS NEEDING MORE INFORMATION FAXED TO A PROVIDER -     Is it okay if the provider responds through MyChart:     "
Patient stating his STD company needs updated paperwork.   Advised will print out old and update to his new release date 9/22/23.   Will call patient once it is completed.   Understood   
Received new STD papers from [employer] [place] [employer]. They are updated and on Dr. Baumann's desk for signature.   
No

## 2024-06-19 DIAGNOSIS — Z98.61 STATUS POST PERCUTANEOUS TRANSLUMINAL CORONARY ANGIOPLASTY: ICD-10-CM

## 2024-06-19 DIAGNOSIS — Z56.6 STRESS AT WORK: ICD-10-CM

## 2024-06-19 DIAGNOSIS — Z12.5 SPECIAL SCREENING FOR MALIGNANT NEOPLASM OF PROSTATE: ICD-10-CM

## 2024-06-19 DIAGNOSIS — I10 PRIMARY HYPERTENSION: ICD-10-CM

## 2024-06-19 DIAGNOSIS — E78.2 MIXED HYPERLIPIDEMIA: ICD-10-CM

## 2024-06-19 DIAGNOSIS — I25.10 CHRONIC CORONARY ARTERY DISEASE: ICD-10-CM

## 2024-06-19 DIAGNOSIS — F17.210 CIGARETTE SMOKER: ICD-10-CM

## 2024-06-19 SDOH — HEALTH STABILITY - MENTAL HEALTH: OTHER PHYSICAL AND MENTAL STRAIN RELATED TO WORK: Z56.6

## 2024-06-20 RX ORDER — LISINOPRIL 5 MG/1
5 TABLET ORAL DAILY
Qty: 90 TABLET | Refills: 0 | Status: SHIPPED | OUTPATIENT
Start: 2024-06-20

## 2024-07-02 RX ORDER — EZETIMIBE 10 MG/1
10 TABLET ORAL DAILY
Qty: 90 TABLET | Refills: 0 | Status: SHIPPED | OUTPATIENT
Start: 2024-07-02

## 2024-07-17 ENCOUNTER — TELEPHONE (OUTPATIENT)
Dept: CARDIOLOGY | Facility: CLINIC | Age: 64
End: 2024-07-17
Payer: MEDICARE

## 2024-07-17 DIAGNOSIS — Z98.61 STATUS POST PERCUTANEOUS TRANSLUMINAL CORONARY ANGIOPLASTY: ICD-10-CM

## 2024-07-17 DIAGNOSIS — I10 PRIMARY HYPERTENSION: ICD-10-CM

## 2024-07-17 DIAGNOSIS — Z12.5 SPECIAL SCREENING FOR MALIGNANT NEOPLASM OF PROSTATE: ICD-10-CM

## 2024-07-17 DIAGNOSIS — F17.210 CIGARETTE SMOKER: ICD-10-CM

## 2024-07-17 DIAGNOSIS — E78.2 MIXED HYPERLIPIDEMIA: ICD-10-CM

## 2024-07-17 DIAGNOSIS — I25.10 CHRONIC CORONARY ARTERY DISEASE: ICD-10-CM

## 2024-07-17 DIAGNOSIS — Z56.6 STRESS AT WORK: ICD-10-CM

## 2024-07-17 RX ORDER — METOPROLOL SUCCINATE 25 MG/1
50 TABLET, EXTENDED RELEASE ORAL DAILY
Qty: 180 TABLET | Refills: 1 | Status: SHIPPED | OUTPATIENT
Start: 2024-07-17

## 2024-07-17 SDOH — HEALTH STABILITY - MENTAL HEALTH: OTHER PHYSICAL AND MENTAL STRAIN RELATED TO WORK: Z56.6

## 2024-07-18 DIAGNOSIS — I10 PRIMARY HYPERTENSION: ICD-10-CM

## 2024-07-18 DIAGNOSIS — Z56.6 STRESS AT WORK: ICD-10-CM

## 2024-07-18 DIAGNOSIS — Z12.5 SPECIAL SCREENING FOR MALIGNANT NEOPLASM OF PROSTATE: ICD-10-CM

## 2024-07-18 DIAGNOSIS — Z98.61 STATUS POST PERCUTANEOUS TRANSLUMINAL CORONARY ANGIOPLASTY: ICD-10-CM

## 2024-07-18 DIAGNOSIS — I25.10 CHRONIC CORONARY ARTERY DISEASE: ICD-10-CM

## 2024-07-18 DIAGNOSIS — F17.210 CIGARETTE SMOKER: ICD-10-CM

## 2024-07-18 DIAGNOSIS — E78.2 MIXED HYPERLIPIDEMIA: ICD-10-CM

## 2024-07-18 RX ORDER — CLOPIDOGREL BISULFATE 75 MG/1
75 TABLET ORAL DAILY
Qty: 90 TABLET | Refills: 0 | Status: SHIPPED | OUTPATIENT
Start: 2024-07-18

## 2024-07-18 RX ORDER — ATORVASTATIN CALCIUM 80 MG/1
80 TABLET, FILM COATED ORAL DAILY
Qty: 90 TABLET | Refills: 0 | Status: SHIPPED | OUTPATIENT
Start: 2024-07-18

## 2024-07-18 SDOH — HEALTH STABILITY - MENTAL HEALTH: OTHER PHYSICAL AND MENTAL STRAIN RELATED TO WORK: Z56.6

## 2024-08-06 DIAGNOSIS — F17.210 CIGARETTE SMOKER: ICD-10-CM

## 2024-08-06 DIAGNOSIS — Z12.5 SPECIAL SCREENING FOR MALIGNANT NEOPLASM OF PROSTATE: ICD-10-CM

## 2024-08-06 DIAGNOSIS — Z56.6 STRESS AT WORK: ICD-10-CM

## 2024-08-06 DIAGNOSIS — I25.10 CHRONIC CORONARY ARTERY DISEASE: ICD-10-CM

## 2024-08-06 DIAGNOSIS — Z98.61 STATUS POST PERCUTANEOUS TRANSLUMINAL CORONARY ANGIOPLASTY: ICD-10-CM

## 2024-08-06 DIAGNOSIS — I10 PRIMARY HYPERTENSION: ICD-10-CM

## 2024-08-06 DIAGNOSIS — E78.2 MIXED HYPERLIPIDEMIA: ICD-10-CM

## 2024-08-06 RX ORDER — CLOPIDOGREL BISULFATE 75 MG/1
75 TABLET ORAL DAILY
Qty: 90 TABLET | Refills: 0 | Status: SHIPPED | OUTPATIENT
Start: 2024-08-06

## 2024-08-06 RX ORDER — ATORVASTATIN CALCIUM 80 MG/1
80 TABLET, FILM COATED ORAL DAILY
Qty: 90 TABLET | Refills: 0 | Status: SHIPPED | OUTPATIENT
Start: 2024-08-06 | End: 2024-08-10 | Stop reason: SDUPTHER

## 2024-08-06 RX ORDER — LISINOPRIL 5 MG/1
5 TABLET ORAL DAILY
Qty: 90 TABLET | Refills: 0 | Status: SHIPPED | OUTPATIENT
Start: 2024-08-06

## 2024-08-06 SDOH — HEALTH STABILITY - MENTAL HEALTH: OTHER PHYSICAL AND MENTAL STRAIN RELATED TO WORK: Z56.6

## 2024-08-10 DIAGNOSIS — I25.10 CHRONIC CORONARY ARTERY DISEASE: ICD-10-CM

## 2024-08-10 DIAGNOSIS — Z98.61 STATUS POST PERCUTANEOUS TRANSLUMINAL CORONARY ANGIOPLASTY: ICD-10-CM

## 2024-08-10 DIAGNOSIS — Z56.6 STRESS AT WORK: ICD-10-CM

## 2024-08-10 DIAGNOSIS — Z12.5 SPECIAL SCREENING FOR MALIGNANT NEOPLASM OF PROSTATE: ICD-10-CM

## 2024-08-10 DIAGNOSIS — F17.210 CIGARETTE SMOKER: ICD-10-CM

## 2024-08-10 DIAGNOSIS — E78.2 MIXED HYPERLIPIDEMIA: ICD-10-CM

## 2024-08-10 DIAGNOSIS — I10 PRIMARY HYPERTENSION: ICD-10-CM

## 2024-08-10 SDOH — HEALTH STABILITY - MENTAL HEALTH: OTHER PHYSICAL AND MENTAL STRAIN RELATED TO WORK: Z56.6

## 2024-08-12 RX ORDER — ATORVASTATIN CALCIUM 80 MG/1
80 TABLET, FILM COATED ORAL DAILY
Qty: 90 TABLET | Refills: 0 | Status: SHIPPED | OUTPATIENT
Start: 2024-08-12

## 2024-09-03 NOTE — PROGRESS NOTES
Encounter Date:09/04/2024  Last seen 2/15/2024      Patient ID: Zia Lott is a 63 y.o. male.    Chief Complaint:     Status post CABG  Ischemic cardiomyopathy  Hypertension  Dyslipidemia  Status post BiV ICD     History of Present Illness  Since I have last seen, the patient has been without any chest discomfort ,shortness of breath, palpitations, dizziness or syncope.  Denies having any headache ,abdominal pain ,nausea, vomiting , diarrhea constipation, loss of weight or loss of appetite.  Denies having any excessive bruising ,hematuria or blood in the stool.    Review of all systems negative except as indicated.    Reviewed ROS.     Denies having any ICD shocks.    Assessment and Plan         ////////////////////  History  ==========  -Status post CABG x 2 with a LIMA to the mid LAD and reverse vein graft to the ramus intermedius on pump off cross-clamp  Dr. Monge-6/22/2023     -History of myocardial infarction in [facility] 2/18/2023  Cardiac cath 2/20/2023 (details not available)  Patient apparently was told he has complex disease and not comfortable in performing intervention in the [facility].      - status post stent to LAD 05/30/2015 and 06/20/2013.     Status post subendocardial myocardial infarction 05/30/2015 prior to stent placement.     - Ischemic cardiomyopathy.-Ejection fraction 15%.-3/13/2023     - Status post BiV ICD ([employer])-10/27/2023-Dr. Zamora    Echocardiogram 9/4/2024   Structurally and functionally normal cardiac valves except for mild pulmonary regurgitation.  Left atrial enlargement.  Left ventricle is significantly enlarged with diffuse hypocontractility and apical and periapical akinesis and some dyskinesis with ejection fraction estimated at 20%.  Left ventricular grade 1 diastolic dysfunction is present (MV E/A ratio 0.33)  Right ventricle is normal in size and contractility with a TAPSE of 1.6 cm..    Echocardiogram 9/5/2023   Structurally and functionally normal  cardiac valves except for mild mitral regurgitation..  Left ventricle is enlarged with severe and diffuse hypocontractility with ejection fraction of 20 to 25%.     Echocardiogram 3/13/2023 revealed  Structurally and functionally normal cardiac valves.  Significant left ventricle enlargement with severe and diffuse hypocontractility with ejection fraction of 15%.     Cardiac cath 6/2/2023   Mild pulmonary hypertension.  Left ventricle is significantly enlarged with severe and diffuse hypocontractility with ejection fraction of 20 to 25%.  No mitral regurgitation is seen.     Left main coronary artery has distal 70 to 75% disease (IVUS)  Left anterior descending artery stent is patent.  Ostial left anterior sending artery has calcific 80% disease (0.65 by IFR)  Diagonal branch is a small caliber vessel that has ostial 70% disease.  Circumflex coronary artery has ostial 90 to 95% disease and 90% disease distal to the second marginal branch.  (IFR 0.68)  First marginal branch has ostial 70% disease.  Second marginal branch is totally occluded at the origin and distal vessel is filling faintly from right circulation.  Right coronary artery has significant anterior origin and did not have any significant disease.     The films were reviewed with Dr. Dc interventionalists.  IFR and IVUS was performed (please see above).  Patient has significant left ventricular dysfunction.  Patient was thought to have coronary artery disease that is not easily amenable for intervention and potentially could compromise circumflex if an attempt is made to stent the left main and left anterior descending artery.  We will reinitiate discussion with cardiovascular surgery Dr. Monge regarding surgical intervention.  However significant left ventricular dysfunction is of concern.     Cardiac catheterization 2015 revealed -  Previously placed LAD stent was patent.  Distal LAD has 50% disease.  Chronic and total occlusion of 1st marginal  branch.  Distal marginal branch was filling from RCA.  Circumflex coronary artery has 90-95%  distal to the origin of marginal branch.  RCA is a large and dominant vessel that has diffuse 50% disease.     Echocardiogram 09/11/2018 revealed severe left ventricular dysfunction with ejection fraction of 25%.  Moderate mitral and tricuspid regurgitation is present.     Stress Cardiolite test 09/11/2018 revealed significant anterior apical inferior and poster lateral infarction and ischemia.       -status post anterior wall myocardial infarction 06/2013 and stent placement to LAD 06/2013.       -ischemic cardiomyopathy.  Recent left ventricular ejection fraction was 25-30%.       -smoker -patient was advised to abstain from smoking.        -chronic left bundle-branch block      -hypertension and dyslipidemia      -history of drinking alcohol     -no known allergies  ===========  Assessment and plan  =============  Status post CABG x 2 with a LIMA to the mid LAD and reverse vein graft to the ramus intermedius on pump off cross-clamp  Dr. Monge-6/22/2023  Patient is not having any angina pectoris or congestive heart failure.     Ischemic cardiomyopathy  Patient is on GDMT.     Status post BiV ICD 10/27/2023-Dr. Zamora.  Left ventricular lead was reprogrammed with better vector.  ICD site looks normal.  Interrogation of the ICD revealed excellent pacing parameters 9/4/2024.  Battery status is 8.5 years.    Echocardiogram 9/4/2024   Structurally and functionally normal cardiac valves except for mild pulmonary regurgitation.  Left atrial enlargement.  Left ventricle is significantly enlarged with diffuse hypocontractility and apical and periapical akinesis and some dyskinesis with ejection fraction estimated at 20%.  Left ventricular grade 1 diastolic dysfunction is present (MV E/A ratio 0.33)  Right ventricle is normal in size and contractility with a TAPSE of 1.6 cm.     Patient was educated regarding the results of  the testing.     Rhythm-sinus.  EKG 7/24/2023 revealed sinus rhythm nonspecific intraventricular conduction delay normal axis no ectopy no significant change from 6/24/2023.  EKG was not performed today 9/4/2024.     Dyslipidemia-on atorvastatin  Patient is off Zetia.     Medications were reviewed and updated.  Patient is on aspirin atorvastatin amiodarone 200 mg twice daily Lasix 40 mg a day carphenazine metoprolol tartrate 12.5 mg twice daily pantoprazole.  Spironolactone 25 mg a day and low-dose lisinopril at 2.5 mg a day.     Patient is on amiodarone to 200 mg once a day.  Consider discontinuation of amiodarone at next visit.       Follow-up in the office in 6 months with ICD interrogation.    Further plan will depend on patient's progress.     Reviewed and updated 9/4/2024.  ]]]]]]]]]]]]]]]]]]]]]             Diagnosis Plan   1. Cardiomyopathy, ischemic        2. LBBB (left bundle branch block)        3. Presence of biventricular implantable cardioverter-defibrillator (ICD)        4. Chronic coronary artery disease        5. Ischemic cardiomyopathy        6. Status post angioplasty with stent        7. Dyslipidemia        8. Hx of CABG        9. Essential hypertension        10. Acute on chronic systolic heart failure        11. Chronic systolic CHF (congestive heart failure)        LAB RESULTS (LAST 7 DAYS)    CBC        BMP        CMP         BNP        TROPONIN        CoAg        Creatinine Clearance  CrCl cannot be calculated (Patient's most recent lab result is older than the maximum 30 days allowed.).    ABG        Radiology  No radiology results for the last day                The following portions of the patient's history were reviewed and updated as appropriate: allergies, current medications, past family history, past medical history, past social history, past surgical history, and problem list.    Review of Systems   Constitutional: Negative for malaise/fatigue.   Cardiovascular:  Negative for chest  pain, dyspnea on exertion, leg swelling and palpitations.   Respiratory:  Negative for cough and shortness of breath.    Gastrointestinal:  Negative for abdominal pain, nausea and vomiting.   Neurological:  Negative for dizziness, focal weakness, headaches, light-headedness and numbness.   All other systems reviewed and are negative.      Current Outpatient Medications:     aspirin 81 MG EC tablet, Take 1 tablet by mouth Daily., Disp: , Rfl:     atorvastatin (LIPITOR) 80 MG tablet, Take 1 tablet by mouth Daily., Disp: 90 tablet, Rfl: 0    clopidogrel (PLAVIX) 75 MG tablet, TAKE 1 TABLET BY MOUTH EVERY DAY, Disp: 90 tablet, Rfl: 0    ezetimibe (ZETIA) 10 MG tablet, TAKE 1 TABLET BY MOUTH EVERY DAY, Disp: 90 tablet, Rfl: 0    lisinopril (PRINIVIL,ZESTRIL) 5 MG tablet, TAKE 1 TABLET BY MOUTH EVERY DAY, Disp: 90 tablet, Rfl: 0    metoprolol succinate XL (TOPROL-XL) 25 MG 24 hr tablet, TAKE 2 TABLETS BY MOUTH DAILY, Disp: 180 tablet, Rfl: 1    nitroglycerin (Nitrostat) 0.4 MG SL tablet, Place 1 tablet under the tongue Every 5 (Five) Minutes As Needed for Chest Pain. Take no more than 3 doses in 15 minutes., Disp: 30 tablet, Rfl: 6    vitamin D (ERGOCALCIFEROL) 1.25 MG (33814 UT) capsule capsule, Take 1 capsule by mouth Every 7 (Seven) Days for 24 doses., Disp: 12 capsule, Rfl: 1    Vitamin D, Cholecalciferol, (CHOLECALCIFEROL) 10 MCG (400 UNIT) tablet, Take 1 tablet by mouth Daily., Disp: , Rfl:   No current facility-administered medications for this visit.    Facility-Administered Medications Ordered in Other Visits:     Chlorhexidine Gluconate Cloth 2 % pads 1 application, 1 application , Topical, Q12H PRN, Virgie Mejia APRN    Allergies   Allergen Reactions    Adhesive Tape Rash       Family History   Problem Relation Age of Onset    Diabetes Father     Heart failure Father        Past Surgical History:   Procedure Laterality Date    BIVENTRICULLAR IMPLANTABLE CARDIOVERTER DEFIBRILLATOR PLACEMENT N/A 10/27/2023     Procedure: Implant ICD - bi ventricular, [employer] REP EMAILED;  Surgeon: Kaushal Zamora MD;  Location: [facility] CATH INVASIVE LOCATION;  Service: Cardiovascular;  Laterality: N/A;    CARDIAC CATHETERIZATION  05/30/2015    CARDIAC CATHETERIZATION N/A 06/02/2023    Procedure: Left Heart Cath with Coronary Angiography;  Surgeon: Judson Baumann MD;  Location: [facility] CATH INVASIVE LOCATION;  Service: Cardiovascular;  Laterality: N/A;    CARDIAC CATHETERIZATION N/A 06/02/2023    Procedure: Right Heart Cath;  Surgeon: Judson Baumann MD;  Location: [facility] CATH INVASIVE LOCATION;  Service: Cardiovascular;  Laterality: N/A;    CARDIAC CATHETERIZATION  06/02/2023    Procedure: Functional Flow Iselin;  Surgeon: Yousif Dc MD;  Location: [facility] CATH INVASIVE LOCATION;  Service: Cardiovascular;;    CARDIAC DEFIBRILLATOR PLACEMENT      COLONOSCOPY      CORONARY ANGIOPLASTY WITH STENT PLACEMENT  05/30/2015    stent lad and circumflex distal to the marginal branch    CORONARY ANGIOPLASTY WITH STENT PLACEMENT  06/2013    stent to lad    CORONARY ARTERY BYPASS GRAFT N/A 06/22/2023    Procedure: CORONARY ARTERY BYPASS GRAFTING, POSSIBLE IMPELLA PLACEMENT;  Surgeon: Pedro Monge MD;  Location: [facility];  Service: Cardiothoracic;  Laterality: N/A;  CABG X 2 (1 vein graft, 1 JUDD graft), Off pump with perfusion assist.    INSERT / REPLACE / REMOVE PACEMAKER      INTERVENTIONAL RADIOLOGY PROCEDURE N/A 06/02/2023    Procedure: Intravascular Ultrasound;  Surgeon: Yousif Dc MD;  Location: [facility] CATH INVASIVE LOCATION;  Service: Cardiovascular;  Laterality: N/A;    TRANSESOPHAGEAL ECHOCARDIOGRAM (YESENIA) N/A 06/22/2023    Procedure: TRANSESOPHAGEAL ECHOCARDIOGRAM WITH ANESTHESIA;  Surgeon: Pedro Monge MD;  Location: [facility];  Service: Cardiothoracic;  Laterality: N/A;       Past Medical History:   Diagnosis Date    BBB (bundle branch block)     lt bundle branch block    CAD (coronary artery disease)      Cardiomyopathy     CHD (coronary heart disease)     Congenital heart disease     Heart murmur     Hyperlipidemia     Hypertension     Myocardial infarction 2023    acute    Sleep apnea        Family History   Problem Relation Age of Onset    Diabetes Father     Heart failure Father        Social History     Socioeconomic History    Marital status: Significant Other   Tobacco Use    Smoking status: Former     Current packs/day: 0.00     Types: Cigarettes     Quit date: 2008     Years since quittin.3     Passive exposure: Current    Smokeless tobacco: Never   Vaping Use    Vaping status: Never Used   Substance and Sexual Activity    Alcohol use: Not Currently    Drug use: Not Currently    Sexual activity: Defer         Procedures      Objective:       Physical Exam    There were no vitals taken for this visit.  The patient is alert, oriented and in no distress.    Vital signs as noted above.    Head and neck revealed no carotid bruits or jugular venous distension.  No thyromegaly or lymphadenopathy is present.    Lungs clear.  No wheezing.  Breath sounds are normal bilaterally.    Heart normal first and second heart sounds.  No murmur..  No pericardial rub is present.  No gallop is present.    Abdomen soft and nontender.  No organomegaly is present.    Extremities revealed good peripheral pulses without any pedal edema.    Skin warm and dry.  ICD site looks normal.    Musculoskeletal system is grossly normal.    CNS grossly normal.    Reviewed and updated.

## 2024-09-04 ENCOUNTER — HOSPITAL ENCOUNTER (OUTPATIENT)
Dept: CARDIOLOGY | Facility: HOSPITAL | Age: 64
Discharge: HOME | End: 2024-09-04
Admitting: INTERNAL MEDICINE
Payer: MEDICARE

## 2024-09-04 ENCOUNTER — CLINICAL SUPPORT NO REQUIREMENTS (OUTPATIENT)
Dept: CARDIOLOGY | Facility: CLINIC | Age: 64
End: 2024-09-04
Payer: MEDICARE

## 2024-09-04 ENCOUNTER — OFFICE VISIT (OUTPATIENT)
Dept: CARDIOLOGY | Facility: CLINIC | Age: 64
End: 2024-09-04
Payer: MEDICARE

## 2024-09-04 VITALS
BODY MASS INDEX: 30.76 KG/M2 | WEIGHT: 196 LBS | SYSTOLIC BLOOD PRESSURE: 110 MMHG | DIASTOLIC BLOOD PRESSURE: 69 MMHG | HEIGHT: 67 IN | HEART RATE: 85 BPM

## 2024-09-04 VITALS
WEIGHT: 194 LBS | HEART RATE: 85 BPM | HEIGHT: 67 IN | DIASTOLIC BLOOD PRESSURE: 69 MMHG | BODY MASS INDEX: 30.45 KG/M2 | SYSTOLIC BLOOD PRESSURE: 110 MMHG

## 2024-09-04 DIAGNOSIS — Z95.1 HX OF CABG: ICD-10-CM

## 2024-09-04 DIAGNOSIS — I10 ESSENTIAL HYPERTENSION: ICD-10-CM

## 2024-09-04 DIAGNOSIS — I25.5 CARDIOMYOPATHY, ISCHEMIC: ICD-10-CM

## 2024-09-04 DIAGNOSIS — Z95.810 PRESENCE OF BIVENTRICULAR IMPLANTABLE CARDIOVERTER-DEFIBRILLATOR (ICD): ICD-10-CM

## 2024-09-04 DIAGNOSIS — I25.10 CHRONIC CORONARY ARTERY DISEASE: ICD-10-CM

## 2024-09-04 DIAGNOSIS — I25.5 CARDIOMYOPATHY, ISCHEMIC: Primary | ICD-10-CM

## 2024-09-04 DIAGNOSIS — I50.22 CHRONIC SYSTOLIC CHF (CONGESTIVE HEART FAILURE): ICD-10-CM

## 2024-09-04 DIAGNOSIS — Z95.820 STATUS POST ANGIOPLASTY WITH STENT: ICD-10-CM

## 2024-09-04 DIAGNOSIS — E78.5 DYSLIPIDEMIA: ICD-10-CM

## 2024-09-04 DIAGNOSIS — I44.7 LBBB (LEFT BUNDLE BRANCH BLOCK): ICD-10-CM

## 2024-09-04 DIAGNOSIS — I25.5 ISCHEMIC CARDIOMYOPATHY: ICD-10-CM

## 2024-09-04 DIAGNOSIS — I50.23 ACUTE ON CHRONIC SYSTOLIC HEART FAILURE: ICD-10-CM

## 2024-09-04 LAB
BH CV ECHO MEAS - ACS: 2.05 CM
BH CV ECHO MEAS - AO MAX PG: 4.6 MMHG
BH CV ECHO MEAS - AO MEAN PG: 2.49 MMHG
BH CV ECHO MEAS - AO ROOT DIAM: 3.1 CM
BH CV ECHO MEAS - AO V2 MAX: 107.7 CM/SEC
BH CV ECHO MEAS - AO V2 VTI: 19.6 CM
BH CV ECHO MEAS - AVA(I,D): 0.86 CM2
BH CV ECHO MEAS - EDV(CUBED): 193.4 ML
BH CV ECHO MEAS - EDV(MOD-SP4): 179.7 ML
BH CV ECHO MEAS - EF(MOD-BP): 21 %
BH CV ECHO MEAS - EF(MOD-SP4): 20.6 %
BH CV ECHO MEAS - ESV(CUBED): 110.4 ML
BH CV ECHO MEAS - ESV(MOD-SP4): 142.6 ML
BH CV ECHO MEAS - FS: 17.1 %
BH CV ECHO MEAS - IVS/LVPW: 0.98 CM
BH CV ECHO MEAS - IVSD: 1.12 CM
BH CV ECHO MEAS - LA DIMENSION: 4.9 CM
BH CV ECHO MEAS - LV MASS(C)D: 271.9 GRAMS
BH CV ECHO MEAS - LV MAX PG: 0.3 MMHG
BH CV ECHO MEAS - LV MEAN PG: 0.18 MMHG
BH CV ECHO MEAS - LV V1 MAX: 27.4 CM/SEC
BH CV ECHO MEAS - LV V1 VTI: 4.6 CM
BH CV ECHO MEAS - LVIDD: 5.8 CM
BH CV ECHO MEAS - LVIDS: 4.8 CM
BH CV ECHO MEAS - LVOT AREA: 3.6 CM2
BH CV ECHO MEAS - LVOT DIAM: 2.15 CM
BH CV ECHO MEAS - LVPWD: 1.14 CM
BH CV ECHO MEAS - MV A MAX VEL: 103.1 CM/SEC
BH CV ECHO MEAS - MV DEC SLOPE: 183.1 CM/SEC2
BH CV ECHO MEAS - MV DEC TIME: 0.19 SEC
BH CV ECHO MEAS - MV E MAX VEL: 34 CM/SEC
BH CV ECHO MEAS - MV E/A: 0.33
BH CV ECHO MEAS - MV MAX PG: 3.8 MMHG
BH CV ECHO MEAS - MV MEAN PG: 0.87 MMHG
BH CV ECHO MEAS - MV V2 VTI: 26.7 CM
BH CV ECHO MEAS - MVA(VTI): 0.63 CM2
BH CV ECHO MEAS - PA V2 MAX: 93 CM/SEC
BH CV ECHO MEAS - PI END-D VEL: 81.5 CM/SEC
BH CV ECHO MEAS - RV MAX PG: 1.16 MMHG
BH CV ECHO MEAS - RV V1 MAX: 53.8 CM/SEC
BH CV ECHO MEAS - RV V1 VTI: 9 CM
BH CV ECHO MEAS - SV(LVOT): 16.9 ML
BH CV ECHO MEAS - SV(MOD-SP4): 37.1 ML
BH CV ECHO MEAS - TR MAX PG: 22.6 MMHG
BH CV ECHO MEAS - TR MAX VEL: 237.4 CM/SEC

## 2024-09-04 PROCEDURE — 93306 TTE W/DOPPLER COMPLETE: CPT

## 2024-10-24 RX ORDER — EZETIMIBE 10 MG/1
10 TABLET ORAL DAILY
Qty: 90 TABLET | Refills: 0 | Status: SHIPPED | OUTPATIENT
Start: 2024-10-24

## 2024-11-18 NOTE — Clinical Note
CHIEF COMPLAINT:   Chief Complaint   Patient presents with    Follow-up     Denies any cardiac problems                                                  HPI:  Frances Carlisle 20 y.o. female with ADHD former tobacco use (vaped - Quit Jan. 2024) who presents to cardiology clinic for routine follow up and results of testing. Last seen as an initial referral for further evaluation of palpitations and chest pain.  The patient underwent a battery of diagnostic testing including a treadmill stress test on 10.15.24, was a normal treadmill stress test in which she achieve target heart rate with no ST-T changes noted. Echocardiogram completed on 10.15.24 revealed preserved EF of 55-60%, normal diastolic function, mild MR and mild TR per ECHO 10.15.24.  48 hour Holter monitor completed in August was unremarkable revealing sinus rhythm with no significant arrhythmias, AV block or pauses noted.     Patient presents to clinic today reporting resolution of her previous symptoms after she changed jobs.  She denies any acute cardiovascular complaints of chest pain, shortness of breath, orthopnea, PND, peripheral edema, claudication, lightheadedness, dizziness, near-syncope or syncope.  She endorses rare palpitations in which she feels her heart flutter for a few seconds but again states that this does not occur often.  She is able to perform her routine activities and now works out at the gym a few times a week without experiencing any anginal or anginal equivalent symptoms.  She states that she was doing well overall.    Social History: Formerly vaped. Vaped from age 13. Recently quit Jan. 2024. Denies any illicit drug, ETOH or tobacco use   Family History: Dad and Brother - Heart Murmur                                                                                                                                                                                                                                                              Report was  verbally given at bedside. .                                                                                                                                                                                                                          CARDIAC TESTING:  ECHO  10.15.24    Left Ventricle: The left ventricle is normal in size. Normal wall thickness. There is normal systolic function with a visually estimated ejection fraction of 55 - 60%. There is normal diastolic function.    Right Ventricle: Normal right ventricular cavity size. Wall thickness is normal. Systolic function is normal. TAPSE is 2.33 cm.    Mitral Valve: The mitral valve is structurally normal. There is mild regurgitation.    Tricuspid Valve: There is mild regurgitation.    Pulmonary Artery: No pulmonary hypertension. The estimated pulmonary artery systolic pressure is 32 mmHg.    IVC/SVC: Normal venous pressure at 3 mmHg.    Pericardium: There is no pericardial effusion.     Treadmill Stress Test 10.15.24  Baseline rhythm-sinus.    Target heart rate achieved.    No ST-T changes.    Normal stress test.      48 Hour Holter Monitor 8.14.24    The predominant rhythm is sinus.     Normal Sinus Rhythm  No significant arrhythmias, AV block, or pauses.          Patient Active Problem List   Diagnosis    Palpitations    Dermatitis    Attention deficit hyperactivity disorder (ADHD), predominantly inattentive type    Elevated HDL     Past Surgical History:   Procedure Laterality Date    SHOULDER SURGERY Right     torn labrum     Social History     Socioeconomic History    Marital status: Single    Number of children: 0    Highest education level: High school graduate   Occupational History     Comment: Works at [employer]   Tobacco Use    Smoking status: Never    Smokeless tobacco: Never    Tobacco comments:     Vaping since 13 years old. Just recently quit 3/4 months ago   Substance and Sexual Activity    Alcohol use: Not Currently     Comment: occ    Drug use: Never    Sexual activity:  Yes     Partners: Female     Birth control/protection: None     Social Drivers of Health     Financial Resource Strain: Low Risk  (6/19/2024)    Overall Financial Resource Strain (CARDIA)     Difficulty of Paying Living Expenses: Not very hard   Food Insecurity: No Food Insecurity (6/19/2024)    Hunger Vital Sign     Worried About Running Out of Food in the Last Year: Never true     Ran Out of Food in the Last Year: Never true   Transportation Needs: No Transportation Needs (6/20/2024)    TRANSPORTATION NEEDS     Transportation : No   Physical Activity: Insufficiently Active (6/19/2024)    Exercise Vital Sign     Days of Exercise per Week: 3 days     Minutes of Exercise per Session: 10 min   Stress: Stress Concern Present (6/19/2024)    [employer] - Occupational Stress Questionnaire     Feeling of Stress : Rather much   Housing Stability: Unknown (6/19/2024)    Housing Stability Vital Sign     Unable to Pay for Housing in the Last Year: No        Family History   Problem Relation Name Age of Onset    Hyperlipidemia Mother      Hypertension Mother      No Known Problems Father       Review of patient's allergies indicates:   Allergen Reactions    Hydrocodone          ROS:  Review of Systems   Constitutional: Negative.    HENT: Negative.     Eyes: Negative.    Respiratory: Negative.  Negative for shortness of breath.    Cardiovascular:  Positive for palpitations.   Gastrointestinal: Negative.    Genitourinary: Negative.    Musculoskeletal: Negative.    Skin: Negative.    Neurological: Negative.    Endo/Heme/Allergies: Negative.    Psychiatric/Behavioral: Negative.                                                                                                                                                                                  Negative except as stated in the history of present illness. See HPI for details.    PHYSICAL EXAM:  Visit Vitals  BP (!) 97/40 (BP Location: Left arm,  "Patient Position: Sitting)   Pulse 66   Temp 98.4 °F (36.9 °C) (Oral)   Resp 18   Ht 5' 2" (1.575 m)   Wt 79.8 kg (176 lb)   SpO2 99%   BMI 32.19 kg/m²         Physical Exam  HENT:      Head: Normocephalic.      Nose: Nose normal.   Eyes:      Pupils: Pupils are equal, round, and reactive to light.   Cardiovascular:      Rate and Rhythm: Normal rate and regular rhythm.   Pulmonary:      Effort: Pulmonary effort is normal.   Abdominal:      General: Abdomen is flat. Bowel sounds are normal.      Palpations: Abdomen is soft.   Musculoskeletal:         General: Normal range of motion.      Cervical back: Normal range of motion.   Skin:     General: Skin is warm.   Neurological:      General: No focal deficit present.      Mental Status: She is alert.   Psychiatric:         Mood and Affect: Mood normal.       Current Outpatient Medications   Medication Instructions    ADDERALL XR 15 mg 24 hr capsule Take by mouth.    betamethasone dipropionate (DIPROLENE) 0.05 % ointment Topical (Top), 2 times daily    betamethasone valerate 0.1% (VALISONE) 0.1 % Crea Topical (Top), 2 times daily    triamcinolone acetonide 0.1% (KENALOG) 0.1 % cream Topical (Top), 2 times daily        All medications, laboratory studies, cardiac diagnostic imaging reviewed.     Lab Results   Component Value Date    LDL 86.00 06/20/2024    TRIG 36 (L) 06/20/2024    CREATININE 0.83 06/20/2024    K 4.0 06/20/2024        ASSESSMENT/PLAN:  Palpitations - improved   Chest Pain- Resolved   SOB- Resolved   - 48 Hour Holter Monitor- unremarkable, predominant rhythm is normal sinus rhythm, no significant arrhythmias, av blocks or pauses noted (Aug. 2024)  - Treadmill Stress Test -normal stress test, target heart rate achieved, No ST-T changes (10.15.24)  - EF-55-60%, normal diastolic function, mild TR, and mild MR per ECHO 10.15.24  - Reports  previous symptoms resolved after switching jobs.  Endorses rare palpitations in which she feels her heart flutter for a " few seconds before self terminating.   - Last EKG- Sinus Bradycardia, ventricular heart rate 54. No room for beta-blocker  - Advised patient to avoid/decrease any caffeine/stimulant intake and remain well hydrated    - Patient is doing well and not having any concerning anginal/anginal equivalent symptoms.  Symptoms have essentially resolved after see switching jobs.  Recommend the patient follow up only as needed in Cardiology Clinic.  She is to continue to follow up with her PCP as directed.            There are no diagnoses linked to this encounter.

## 2024-12-30 ENCOUNTER — OFFICE VISIT (OUTPATIENT)
Dept: FAMILY MEDICINE CLINIC | Facility: CLINIC | Age: 64
End: 2024-12-30
Payer: MEDICARE

## 2024-12-30 ENCOUNTER — LAB (OUTPATIENT)
Dept: FAMILY MEDICINE CLINIC | Facility: CLINIC | Age: 64
End: 2024-12-30
Payer: MEDICARE

## 2024-12-30 VITALS
BODY MASS INDEX: 31.14 KG/M2 | HEART RATE: 88 BPM | RESPIRATION RATE: 18 BRPM | HEIGHT: 67 IN | DIASTOLIC BLOOD PRESSURE: 68 MMHG | OXYGEN SATURATION: 98 % | SYSTOLIC BLOOD PRESSURE: 126 MMHG | WEIGHT: 198.4 LBS

## 2024-12-30 DIAGNOSIS — I10 PRIMARY HYPERTENSION: ICD-10-CM

## 2024-12-30 DIAGNOSIS — I25.10 CHRONIC CORONARY ARTERY DISEASE: ICD-10-CM

## 2024-12-30 DIAGNOSIS — I50.23 ACUTE ON CHRONIC SYSTOLIC HEART FAILURE: ICD-10-CM

## 2024-12-30 DIAGNOSIS — I25.5 CARDIOMYOPATHY, ISCHEMIC: ICD-10-CM

## 2024-12-30 DIAGNOSIS — E55.9 VITAMIN D DEFICIENCY, UNSPECIFIED: ICD-10-CM

## 2024-12-30 DIAGNOSIS — Z95.810 PRESENCE OF BIVENTRICULAR IMPLANTABLE CARDIOVERTER-DEFIBRILLATOR (ICD): ICD-10-CM

## 2024-12-30 DIAGNOSIS — Z00.00 MEDICARE ANNUAL WELLNESS VISIT, INITIAL: Primary | ICD-10-CM

## 2024-12-30 DIAGNOSIS — Z12.5 ENCOUNTER FOR SCREENING FOR MALIGNANT NEOPLASM OF PROSTATE: ICD-10-CM

## 2024-12-30 DIAGNOSIS — R79.9 ABNORMAL FINDING OF BLOOD CHEMISTRY, UNSPECIFIED: ICD-10-CM

## 2024-12-30 DIAGNOSIS — E78.2 MIXED HYPERLIPIDEMIA: ICD-10-CM

## 2024-12-30 DIAGNOSIS — Z95.1 HX OF CABG: ICD-10-CM

## 2024-12-30 LAB
BASOPHILS # BLD AUTO: 0.08 10*3/MM3 (ref 0–0.2)
BASOPHILS NFR BLD AUTO: 0.9 % (ref 0–1.5)
BILIRUB UR QL STRIP: NEGATIVE
CLARITY UR: CLEAR
COLOR UR: YELLOW
DEPRECATED RDW RBC AUTO: 45.1 FL (ref 37–54)
EOSINOPHIL # BLD AUTO: 0.16 10*3/MM3 (ref 0–0.4)
EOSINOPHIL NFR BLD AUTO: 1.7 % (ref 0.3–6.2)
ERYTHROCYTE [DISTWIDTH] IN BLOOD BY AUTOMATED COUNT: 12.6 % (ref 12.3–15.4)
GLUCOSE UR STRIP-MCNC: NEGATIVE MG/DL
HBA1C MFR BLD: 6.21 % (ref 4.8–5.6)
HCT VFR BLD AUTO: 48.8 % (ref 37.5–51)
HGB BLD-MCNC: 15.3 G/DL (ref 13–17.7)
HGB UR QL STRIP.AUTO: NEGATIVE
HOLD SPECIMEN: NORMAL
IMM GRANULOCYTES # BLD AUTO: 0.02 10*3/MM3 (ref 0–0.05)
IMM GRANULOCYTES NFR BLD AUTO: 0.2 % (ref 0–0.5)
KETONES UR QL STRIP: NEGATIVE
LEUKOCYTE ESTERASE UR QL STRIP.AUTO: NEGATIVE
LYMPHOCYTES # BLD AUTO: 3.27 10*3/MM3 (ref 0.7–3.1)
LYMPHOCYTES NFR BLD AUTO: 35.2 % (ref 19.6–45.3)
MCH RBC QN AUTO: 30.4 PG (ref 26.6–33)
MCHC RBC AUTO-ENTMCNC: 31.4 G/DL (ref 31.5–35.7)
MCV RBC AUTO: 97 FL (ref 79–97)
MONOCYTES # BLD AUTO: 1.1 10*3/MM3 (ref 0.1–0.9)
MONOCYTES NFR BLD AUTO: 11.9 % (ref 5–12)
NEUTROPHILS NFR BLD AUTO: 4.65 10*3/MM3 (ref 1.7–7)
NEUTROPHILS NFR BLD AUTO: 50.1 % (ref 42.7–76)
NITRITE UR QL STRIP: NEGATIVE
NRBC BLD AUTO-RTO: 0 /100 WBC (ref 0–0.2)
PH UR STRIP.AUTO: 6 [PH] (ref 5–8)
PLATELET # BLD AUTO: 208 10*3/MM3 (ref 140–450)
PMV BLD AUTO: 10.8 FL (ref 6–12)
PROT UR QL STRIP: NEGATIVE
RBC # BLD AUTO: 5.03 10*6/MM3 (ref 4.14–5.8)
SP GR UR STRIP: 1.01 (ref 1–1.03)
UROBILINOGEN UR QL STRIP: NORMAL
WBC NRBC COR # BLD AUTO: 9.28 10*3/MM3 (ref 3.4–10.8)

## 2024-12-30 PROCEDURE — 84443 ASSAY THYROID STIM HORMONE: CPT | Performed by: FAMILY MEDICINE

## 2024-12-30 PROCEDURE — 36415 COLL VENOUS BLD VENIPUNCTURE: CPT | Performed by: FAMILY MEDICINE

## 2024-12-30 PROCEDURE — 80053 COMPREHEN METABOLIC PANEL: CPT | Performed by: FAMILY MEDICINE

## 2024-12-30 PROCEDURE — 83036 HEMOGLOBIN GLYCOSYLATED A1C: CPT | Performed by: FAMILY MEDICINE

## 2024-12-30 PROCEDURE — 82607 VITAMIN B-12: CPT | Performed by: FAMILY MEDICINE

## 2024-12-30 PROCEDURE — 82306 VITAMIN D 25 HYDROXY: CPT | Performed by: FAMILY MEDICINE

## 2024-12-30 PROCEDURE — 84439 ASSAY OF FREE THYROXINE: CPT | Performed by: FAMILY MEDICINE

## 2024-12-30 PROCEDURE — 85025 COMPLETE CBC W/AUTO DIFF WBC: CPT | Performed by: FAMILY MEDICINE

## 2024-12-30 PROCEDURE — 80061 LIPID PANEL: CPT | Performed by: FAMILY MEDICINE

## 2024-12-30 PROCEDURE — 81003 URINALYSIS AUTO W/O SCOPE: CPT | Performed by: FAMILY MEDICINE

## 2024-12-30 PROCEDURE — G0103 PSA SCREENING: HCPCS | Performed by: FAMILY MEDICINE

## 2024-12-30 NOTE — PROGRESS NOTES
Subjective   The ABCs of the Annual Wellness Visit  Medicare Wellness Visit        Zia Lott is a 64 y.o. patient who presents for a Medicare Wellness Visit.    The following portions of the patient's history were reviewed and   updated as appropriate: allergies, current medications, past family history, past medical history, past social history, past surgical history, and problem list.    Compared to one year ago, the patient's physical   health is better.  Compared to one year ago, the patient's mental   health is better.    Recent Hospitalizations:  He was not admitted to the hospital during the last year.     Current Medical Providers:  Patient Care Team:  Bryan Beckham MD as PCP - Judson Perez MD as Consulting Physician (Cardiology)  Pedro Monge MD as Surgeon (Cardiothoracic Surgery)  Kaushal Zamora MD as Consulting Physician (Cardiology)    Outpatient Medications Prior to Visit   Medication Sig Dispense Refill    aspirin 81 MG EC tablet Take 1 tablet by mouth Daily.      atorvastatin (LIPITOR) 80 MG tablet Take 1 tablet by mouth Daily. 90 tablet 0    clopidogrel (PLAVIX) 75 MG tablet TAKE 1 TABLET BY MOUTH EVERY DAY 90 tablet 0    ezetimibe (ZETIA) 10 MG tablet TAKE 1 TABLET BY MOUTH EVERY DAY 90 tablet 0    lisinopril (PRINIVIL,ZESTRIL) 5 MG tablet TAKE 1 TABLET BY MOUTH EVERY DAY 90 tablet 0    metoprolol succinate XL (TOPROL-XL) 25 MG 24 hr tablet TAKE 2 TABLETS BY MOUTH DAILY (Patient taking differently: Take 1 tablet by mouth Daily.) 180 tablet 1    nitroglycerin (Nitrostat) 0.4 MG SL tablet Place 1 tablet under the tongue Every 5 (Five) Minutes As Needed for Chest Pain. Take no more than 3 doses in 15 minutes. 30 tablet 6    Vitamin D, Cholecalciferol, (CHOLECALCIFEROL) 10 MCG (400 UNIT) tablet Take 1 tablet by mouth Daily.       Facility-Administered Medications Prior to Visit   Medication Dose Route Frequency Provider Last Rate Last Admin    Chlorhexidine  "Gluconate Cloth 2 % pads 1 application  1 application  Topical Q12H PRN RobertoVirgie LAKESHIA         No opioid medication identified on active medication list. I have reviewed chart for other potential  high risk medication/s and harmful drug interactions in the elderly.      Aspirin is on active medication list. Aspirin use is indicated based on review of current medical condition/s. Pros and cons of this therapy have been discussed today. Benefits of this medication outweigh potential harm.  Patient has been encouraged to continue taking this medication.  .      Patient Active Problem List   Diagnosis    Cardiomyopathy, ischemic    Chronic coronary artery disease    Hyperlipidemia    Hypertension    Status post percutaneous transluminal coronary angioplasty    ST elevation (STEMI) myocardial infarction    Stress at work    DORA (generalized anxiety disorder)    Medicare annual wellness visit, initial    Status post angioplasty with stent    LBBB (left bundle branch block)    Acute on chronic systolic heart failure    Chronic systolic CHF (congestive heart failure)    Presence of biventricular implantable cardioverter-defibrillator (ICD)    Acute bursitis of left shoulder     Advance Care Planning Advance Directive is on file.  ACP discussion was held with the patient during this visit. Patient has an advance directive in EMR which is still valid.             Objective   Vitals:    12/30/24 1059   BP: 126/68   Pulse: 88   Resp: 18   SpO2: 98%   Weight: 90 kg (198 lb 6.4 oz)   Height: 170.2 cm (67\")   PainSc: 0-No pain       Estimated body mass index is 31.07 kg/m² as calculated from the following:    Height as of this encounter: 170.2 cm (67\").    Weight as of this encounter: 90 kg (198 lb 6.4 oz).    BMI is >= 30 and <35. (Class 1 Obesity). The following options were offered after discussion;: weight loss educational material (shared in after visit summary), exercise counseling/recommendations, and nutrition " counseling/recommendations           Does the patient have evidence of cognitive impairment? No                                                                                                Health  Risk Assessment    Smoking Status:  Social History     Tobacco Use   Smoking Status Former    Current packs/day: 0.00    Types: Cigarettes    Quit date: 2008    Years since quittin.6    Passive exposure: Current   Smokeless Tobacco Never     Alcohol Consumption:  Social History     Substance and Sexual Activity   Alcohol Use Not Currently       Fall Risk Screen  ZACHARYADI Fall Risk Assessment was completed, and patient is at LOW risk for falls.Assessment completed on:2024    Depression Screening   Little interest or pleasure in doing things? Not at all   Feeling down, depressed, or hopeless? Not at all   PHQ-2 Total Score 0      Health Habits and Functional and Cognitive Screenin/23/2024     9:11 AM   Functional & Cognitive Status   Do you have difficulty preparing food and eating? No    Do you have difficulty bathing yourself, getting dressed or grooming yourself? No    Do you have difficulty using the toilet? No    Do you have difficulty moving around from place to place? No    Do you have trouble with steps or getting out of a bed or a chair? No    Current Diet Limited Junk Food    Dental Exam Not up to date    Eye Exam Up to date    Exercise (times per week) 3 times per week    Current Exercises Include Walking    Do you need help using the phone?  No    Are you deaf or do you have serious difficulty hearing?  No    Do you need help to go to places out of walking distance? No    Do you need help shopping? No    Do you need help preparing meals?  No    Do you need help with housework?  No    Do you need help with laundry? No    Do you need help taking your medications? No    Do you need help managing money? No    Do you ever drive or ride in a car without wearing a seat belt? No    Have you felt  unusual stress, anger or loneliness in the last month? No    Who do you live with? Spouse    If you need help, do you have trouble finding someone available to you? No    Have you been bothered in the last four weeks by sexual problems? No    Do you have difficulty concentrating, remembering or making decisions? No        Patient-reported           Age-appropriate Screening Schedule:  Refer to the list below for future screening recommendations based on patient's age, sex and/or medical conditions. Orders for these recommended tests are listed in the plan section. The patient has been provided with a written plan.    Health Maintenance List  Health Maintenance   Topic Date Due    Pneumococcal Vaccine 65+ (1 of 2 - PCV) Never done    DIABETIC EYE EXAM  Never done    TDAP/TD VACCINES (1 - Tdap) Never done    ZOSTER VACCINE (1 of 2) Never done    AAA SCREEN ONCE  Never done    HEMOGLOBIN A1C  06/05/2024    INFLUENZA VACCINE  Never done    COVID-19 Vaccine (4 - 2024-25 season) 09/01/2024    LIPID PANEL  12/05/2024    ANNUAL WELLNESS VISIT  12/11/2024    BMI FOLLOWUP  12/11/2024    COLORECTAL CANCER SCREENING  10/21/2031    HEPATITIS C SCREENING  Completed    URINE MICROALBUMIN  Discontinued                                                                                                                                                CMS Preventative Services Quick Reference  Risk Factors Identified During Encounter  None Identified    The above risks/problems have been discussed with the patient.  Pertinent information has been shared with the patient in the After Visit Summary.  An After Visit Summary and PPPS were made available to the patient.    Follow Up:   Next Medicare Wellness visit to be scheduled in 1 year.         Additional E&M Note during same encounter follows:  Patient has additional, significant, and separately identifiable condition(s)/problem(s) that require work above and beyond the Medicare Wellness  Visit     Chief Complaint  Medicare Wellness-subsequent    Subjective    HPI         The patient is a 64-year-old male who presents for a Medicare annual wellness visit.    He reports an improvement in his overall physical and mental health compared to the previous year. He has not required any hospitalizations within the past year. He has advanced directives in place, including a living will, and has expressed a preference against resuscitation. He is currently employed part-time, working three days a week for a total of 15 hours. He has been proactive in managing his weight through dietary modifications and physical activity, although he has not achieved his desired weight loss. He abstains from smoking and alcohol consumption. He is due for a colonoscopy, which is scheduled to be performed within the next month. He recently acquired a new pair of glasses last year. He does not experience any neuropathy in his feet and does not require podiatric care. He has not sought dental care recently but is considering dental implants. He consults a dermatologist as needed and is under the care of a cardiologist. He does not routinely see any other specialists. He has received three doses of the COVID-19 vaccine but has declined the influenza and pneumonia vaccines. He has not received a tetanus vaccine in a long time. He was advised to receive the shingles vaccine after turning 63. He reports no issues with urination but notes frequent urination at night. He engages in regular physical activity through his work and has recently started physical therapy. He has not had any blood work done in the past year. He reports some hearing difficulties but does not believe he requires any intervention at this time.    He continues to have a defibrillator implanted and experiences shoulder pain since its placement.    He has a history of angioplasty, stent placement, and bypass surgery involving two blood vessels.    He experiences  "intermittent soreness in his thumb, which he attributes to frequent use at work. He also reports the presence of lumps on one hand, while the other remains smooth.    SOCIAL HISTORY  He does not smoke. He does not drink alcohol.    FAMILY HISTORY  He has two sisters with the beginning stages of dementia and Parkinson's.    MEDICATIONS  Aspirin 81 mg daily    IMMUNIZATIONS  He has had three doses of the COVID-19 vaccine. He received an influenza vaccine years ago. He has not received a pneumonia vaccine. He has not received a tetanus vaccine in a long time. He was advised to receive the shingles vaccine after turning 63.          Objective   Vital Signs:  /68   Pulse 88   Resp 18   Ht 170.2 cm (67\")   Wt 90 kg (198 lb 6.4 oz)   SpO2 98%   BMI 31.07 kg/m²   Physical Exam  Constitutional:       Appearance: Normal appearance. He is well-developed and normal weight.   HENT:      Head: Normocephalic and atraumatic.      Right Ear: Tympanic membrane, ear canal and external ear normal.      Left Ear: Tympanic membrane, ear canal and external ear normal.      Nose: Nose normal.      Mouth/Throat:      Mouth: Mucous membranes are moist.      Pharynx: Oropharynx is clear. No oropharyngeal exudate.   Eyes:      Extraocular Movements: Extraocular movements intact.      Conjunctiva/sclera: Conjunctivae normal.      Pupils: Pupils are equal, round, and reactive to light.   Cardiovascular:      Rate and Rhythm: Normal rate and regular rhythm.      Pulses: Normal pulses.      Heart sounds: Normal heart sounds.   Pulmonary:      Effort: Pulmonary effort is normal.      Breath sounds: Normal breath sounds.   Abdominal:      General: Bowel sounds are normal.      Palpations: Abdomen is soft.   Musculoskeletal:         General: Normal range of motion.      Cervical back: Normal range of motion and neck supple.   Skin:     General: Skin is warm and dry.   Neurological:      General: No focal deficit present.      Mental " "Status: He is alert and oriented to person, place, and time. Mental status is at baseline.   Psychiatric:         Mood and Affect: Mood normal.         Behavior: Behavior normal.         Thought Content: Thought content normal.         Judgment: Judgment normal.           Oral exam was performed.  Lungs were auscultated.    Vital Signs  The patient's BMI is 31. Blood pressure reading is 126/68.                Assessment and Plan           1. Medicare wellness visit.  Upon arrival to the room the patient underwent the Medicare health risk assessment.  Neither the questions themselves or the answers that were given prompted any major concern on the part of the patient or by the medical staff that gave the assessment.  As far as the preventative care examinations and the preventative care immunizations that this patient requires they are as listed below.   Screening tests recommended:    Colonoscopy -utd and scheduled for new one  eye exam-utd  foot exam- prn  PSA-utd  Dentist- needs to see  Derm-prn  Card-utd  Immunization:  Influenza does not get  Prevnar-needs to get  Pneumovax-  \"\"  Tetanus- at pharm  Shingles vaccine- will get  Hepatitis -utd  Covid -utd  RSV   -wait until 75              BMI is recorded at 31. He has received 3 doses of the COVID-19 vaccine but has declined the influenza and pneumonia vaccines. He has not received a tetanus vaccine in a long time. He was advised to wait until after age 65 for the shingles vaccine. He was counseled on the importance of maintaining dental health due to its potential impact on cardiac and cognitive health. He was advised to consider receiving the pneumonia vaccine, which provides lifelong protection against 23 strains of bacterial pneumonia. He was also recommended to receive the tetanus and shingles vaccines at the pharmacy. In the event of a suspected COVID-19 infection, he should wait 4 to 5 days post-symptom onset before testing. He was informed that the RSV " vaccine is not necessary until he reaches the age of 75. A lab order was placed.    2. Dupuytren's contractures.  He presents with early signs of arthritis in his thumb, along with the formation of fibrous bands in his tendons, indicative of Dupuytren's contractures. He was advised to perform stretching exercises and massage the affected area to prevent further progression. If the condition worsens, a referral to a hand specialist will be considered.    3.  Coronary artery disease  Patient has had severe coronary disease in the past and has had several myocardial infarctions.  He has had both stents placed and coronary bypass grafting.  Despite all this he still the cardiomyopathy and he is doing his best with just adjusting and adapting to this.  Currently he is really asymptomatic.    4.  Hyperlipidemia-the patient has a known elevated cholesterol.  Will recheck that the day and make adjustments in medication treatment if needed    5.  Hypertension  Patient is under treatment for hypertension but his blood pressure has been excellent the last couple times we have checked it.  Will continue current therapy and make adjustments if needed in the future    6.  Biventricular implanted cardio defibrillator  Patient has severe cardiomyopathy and because of that he has a defibrillator placed in case he would kick into ventricular tachycardia or ventricular fibrillation.  It is not gone off yet of but he is it so higher risk because of his cardiomyopathy that is felt to be necessary.  And also has a pacemaker plus present.    7.  Encounter for screening for prostate cancer  Patient will have PSA done today    8.  Vitamin D deficiency  Patient will have vitamin D level done today      Follow-up  The patient will follow up in 1 year.    PROCEDURE  The patient has undergone angioplasty, stent placement, and a double bypass surgery in June 2023.    No orders of the defined types were placed in this encounter.            Follow  Up   No follow-ups on file.  Patient was given instructions and counseling regarding his condition or for health maintenance advice. Please see specific information pulled into the AVS if appropriate.  Patient or patient representative verbalized consent for the use of Ambient Listening during the visit with  Bryan Beckham MD for chart documentation. 12/30/2024  11:58 EST

## 2024-12-31 LAB
25(OH)D3 SERPL-MCNC: 52.6 NG/ML (ref 30–100)
ALBUMIN SERPL-MCNC: 4.2 G/DL (ref 3.5–5.2)
ALBUMIN/GLOB SERPL: 1.6 G/DL
ALP SERPL-CCNC: 63 U/L (ref 39–117)
ALT SERPL W P-5'-P-CCNC: 25 U/L (ref 1–41)
ANION GAP SERPL CALCULATED.3IONS-SCNC: 2 MMOL/L (ref 5–15)
AST SERPL-CCNC: 23 U/L (ref 1–40)
BILIRUB SERPL-MCNC: 0.5 MG/DL (ref 0–1.2)
BUN SERPL-MCNC: 11 MG/DL (ref 8–23)
BUN/CREAT SERPL: 12.8 (ref 7–25)
CALCIUM SPEC-SCNC: 10.3 MG/DL (ref 8.6–10.5)
CHLORIDE SERPL-SCNC: 105 MMOL/L (ref 98–107)
CHOLEST SERPL-MCNC: 130 MG/DL (ref 0–200)
CO2 SERPL-SCNC: 29 MMOL/L (ref 22–29)
CREAT SERPL-MCNC: 0.86 MG/DL (ref 0.76–1.27)
EGFRCR SERPLBLD CKD-EPI 2021: 95.5 ML/MIN/1.73
GLOBULIN UR ELPH-MCNC: 2.7 GM/DL
GLUCOSE SERPL-MCNC: 88 MG/DL (ref 65–99)
HDLC SERPL-MCNC: 51 MG/DL (ref 40–60)
LDLC SERPL CALC-MCNC: 59 MG/DL (ref 0–100)
LDLC/HDLC SERPL: 1.11 {RATIO}
POTASSIUM SERPL-SCNC: 5 MMOL/L (ref 3.5–5.2)
PROT SERPL-MCNC: 6.9 G/DL (ref 6–8.5)
PSA SERPL-MCNC: 3.01 NG/ML (ref 0–4)
SODIUM SERPL-SCNC: 136 MMOL/L (ref 136–145)
T4 FREE SERPL-MCNC: 1.22 NG/DL (ref 0.92–1.68)
TRIGL SERPL-MCNC: 111 MG/DL (ref 0–150)
TSH SERPL DL<=0.05 MIU/L-ACNC: 3.11 UIU/ML (ref 0.27–4.2)
VIT B12 BLD-MCNC: 432 PG/ML (ref 211–946)
VLDLC SERPL-MCNC: 20 MG/DL (ref 5–40)

## 2025-01-18 DIAGNOSIS — I10 PRIMARY HYPERTENSION: ICD-10-CM

## 2025-01-18 DIAGNOSIS — Z56.6 STRESS AT WORK: ICD-10-CM

## 2025-01-18 DIAGNOSIS — E78.2 MIXED HYPERLIPIDEMIA: ICD-10-CM

## 2025-01-18 DIAGNOSIS — I25.10 CHRONIC CORONARY ARTERY DISEASE: ICD-10-CM

## 2025-01-18 DIAGNOSIS — F17.210 CIGARETTE SMOKER: ICD-10-CM

## 2025-01-18 DIAGNOSIS — Z12.5 SPECIAL SCREENING FOR MALIGNANT NEOPLASM OF PROSTATE: ICD-10-CM

## 2025-01-18 DIAGNOSIS — Z98.61 STATUS POST PERCUTANEOUS TRANSLUMINAL CORONARY ANGIOPLASTY: ICD-10-CM

## 2025-01-18 SDOH — HEALTH STABILITY - MENTAL HEALTH: OTHER PHYSICAL AND MENTAL STRAIN RELATED TO WORK: Z56.6

## 2025-01-20 RX ORDER — METOPROLOL SUCCINATE 25 MG/1
50 TABLET, EXTENDED RELEASE ORAL DAILY
Qty: 180 TABLET | Refills: 1 | Status: SHIPPED | OUTPATIENT
Start: 2025-01-20

## 2025-01-23 DIAGNOSIS — F17.210 CIGARETTE SMOKER: ICD-10-CM

## 2025-01-23 DIAGNOSIS — I10 PRIMARY HYPERTENSION: ICD-10-CM

## 2025-01-23 DIAGNOSIS — Z12.5 SPECIAL SCREENING FOR MALIGNANT NEOPLASM OF PROSTATE: ICD-10-CM

## 2025-01-23 DIAGNOSIS — Z56.6 STRESS AT WORK: ICD-10-CM

## 2025-01-23 DIAGNOSIS — E78.2 MIXED HYPERLIPIDEMIA: ICD-10-CM

## 2025-01-23 DIAGNOSIS — I25.10 CHRONIC CORONARY ARTERY DISEASE: ICD-10-CM

## 2025-01-23 DIAGNOSIS — Z98.61 STATUS POST PERCUTANEOUS TRANSLUMINAL CORONARY ANGIOPLASTY: ICD-10-CM

## 2025-01-23 RX ORDER — LISINOPRIL 5 MG/1
5 TABLET ORAL DAILY
Qty: 90 TABLET | Refills: 0 | Status: SHIPPED | OUTPATIENT
Start: 2025-01-23

## 2025-01-23 RX ORDER — CLOPIDOGREL BISULFATE 75 MG/1
75 TABLET ORAL DAILY
Qty: 90 TABLET | Refills: 0 | Status: SHIPPED | OUTPATIENT
Start: 2025-01-23

## 2025-01-23 RX ORDER — EZETIMIBE 10 MG/1
10 TABLET ORAL DAILY
Qty: 90 TABLET | Refills: 0 | Status: SHIPPED | OUTPATIENT
Start: 2025-01-23

## 2025-01-23 SDOH — HEALTH STABILITY - MENTAL HEALTH: OTHER PHYSICAL AND MENTAL STRAIN RELATED TO WORK: Z56.6

## 2025-02-12 ENCOUNTER — TELEPHONE (OUTPATIENT)
Dept: CARDIOLOGY | Facility: CLINIC | Age: 65
End: 2025-02-12
Payer: MEDICARE

## 2025-02-12 NOTE — TELEPHONE ENCOUNTER
FACILITY: [facility]  DR: YANIQUE MOREIRA  PHONE: [phone]  FAX: [phone]  PROCEDURE: COLONOSCOPY  SCHEDULED: TBD  MEDS TO HOLD: PLAVIX FOR 5 DAYS    PLACED ON PROVIDERS DESK FOR REVIEW    Office Visit with Judson Baumann MD (09/04/2024)

## 2025-02-13 DIAGNOSIS — Z56.6 STRESS AT WORK: ICD-10-CM

## 2025-02-13 DIAGNOSIS — F17.210 CIGARETTE SMOKER: ICD-10-CM

## 2025-02-13 DIAGNOSIS — Z98.61 STATUS POST PERCUTANEOUS TRANSLUMINAL CORONARY ANGIOPLASTY: ICD-10-CM

## 2025-02-13 DIAGNOSIS — E78.2 MIXED HYPERLIPIDEMIA: ICD-10-CM

## 2025-02-13 DIAGNOSIS — Z12.5 SPECIAL SCREENING FOR MALIGNANT NEOPLASM OF PROSTATE: ICD-10-CM

## 2025-02-13 DIAGNOSIS — I10 PRIMARY HYPERTENSION: ICD-10-CM

## 2025-02-13 DIAGNOSIS — I25.10 CHRONIC CORONARY ARTERY DISEASE: ICD-10-CM

## 2025-02-13 RX ORDER — EZETIMIBE 10 MG/1
10 TABLET ORAL DAILY
Qty: 90 TABLET | Refills: 0 | Status: SHIPPED | OUTPATIENT
Start: 2025-02-13

## 2025-02-13 RX ORDER — ATORVASTATIN CALCIUM 80 MG/1
80 TABLET, FILM COATED ORAL DAILY
Qty: 90 TABLET | Refills: 0 | Status: SHIPPED | OUTPATIENT
Start: 2025-02-13

## 2025-02-13 RX ORDER — LISINOPRIL 5 MG/1
5 TABLET ORAL DAILY
Qty: 90 TABLET | Refills: 0 | Status: SHIPPED | OUTPATIENT
Start: 2025-02-13

## 2025-02-13 RX ORDER — CLOPIDOGREL BISULFATE 75 MG/1
75 TABLET ORAL DAILY
Qty: 90 TABLET | Refills: 0 | Status: SHIPPED | OUTPATIENT
Start: 2025-02-13

## 2025-02-13 SDOH — HEALTH STABILITY - MENTAL HEALTH: OTHER PHYSICAL AND MENTAL STRAIN RELATED TO WORK: Z56.6

## 2025-02-13 NOTE — TELEPHONE ENCOUNTER
PATIENT IS CLEARED FOR PROCEDURE AND CAN HOLD MEDICATION FOR 5 DAYS. LETTER HAS BEEN SIGNED AND FAXED BACK TO THE DOCTORS OFFICE.

## 2025-02-18 PROBLEM — Z86.010 PERSONAL HISTORY OF COLONIC POLYPS: Status: ACTIVE | Noted: 2021-10-21

## 2025-03-03 NOTE — PROGRESS NOTES
Encounter Date:03/10/2025  Last seen 9/4/2024      Patient ID: Zia Lott is a 64 y.o. male.      Chief Complaint:  Status post CABG  Ischemic cardiomyopathy  Hypertension  Dyslipidemia  Status post BiV ICD    History of present illness  Since I have last seen, the patient has been without any chest discomfort ,shortness of breath, palpitations, dizziness or syncope.  Denies having any headache ,abdominal pain ,nausea, vomiting , diarrhea constipation, loss of weight or loss of appetite.  Denies having any excessive bruising ,hematuria or blood in the stool.    Review of all systems negative except as indicated.    Reviewed ROS.    Denies having any ICD shocks.  Assessment and Plan         ////////////////////  History  ==========  -Status post CABG x 2 with a LIMA to the mid LAD and reverse vein graft to the ramus intermedius on pump off cross-clamp  Dr. Monge-6/22/2023     -History of myocardial infarction in [facility] 2/18/2023  Cardiac cath 2/20/2023 (details not available)  Patient apparently was told he has complex disease and not comfortable in performing intervention in the [facility].      - status post stent to LAD 05/30/2015 and 06/20/2013.     Status post subendocardial myocardial infarction 05/30/2015 prior to stent placement.     - Ischemic cardiomyopathy.-Ejection fraction 15%.-3/13/2023     - Status post BiV ICD ([employer])-10/27/2023-Dr. Zamora     Echocardiogram 9/4/2024   Structurally and functionally normal cardiac valves except for mild pulmonary regurgitation.  Left atrial enlargement.  Left ventricle is significantly enlarged with diffuse hypocontractility and apical and periapical akinesis and some dyskinesis with ejection fraction estimated at 20%.  Left ventricular grade 1 diastolic dysfunction is present (MV E/A ratio 0.33)  Right ventricle is normal in size and contractility with a TAPSE of 1.6 cm..     Echocardiogram 9/5/2023   Structurally and functionally normal cardiac  valves except for mild mitral regurgitation..  Left ventricle is enlarged with severe and diffuse hypocontractility with ejection fraction of 20 to 25%.     Echocardiogram 3/13/2023 revealed  Structurally and functionally normal cardiac valves.  Significant left ventricle enlargement with severe and diffuse hypocontractility with ejection fraction of 15%.     Cardiac cath 6/2/2023   Mild pulmonary hypertension.  Left ventricle is significantly enlarged with severe and diffuse hypocontractility with ejection fraction of 20 to 25%.  No mitral regurgitation is seen.     Left main coronary artery has distal 70 to 75% disease (IVUS)  Left anterior descending artery stent is patent.  Ostial left anterior sending artery has calcific 80% disease (0.65 by IFR)  Diagonal branch is a small caliber vessel that has ostial 70% disease.  Circumflex coronary artery has ostial 90 to 95% disease and 90% disease distal to the second marginal branch.  (IFR 0.68)  First marginal branch has ostial 70% disease.  Second marginal branch is totally occluded at the origin and distal vessel is filling faintly from right circulation.  Right coronary artery has significant anterior origin and did not have any significant disease.     The films were reviewed with Dr. Dc interventionalists.  IFR and IVUS was performed (please see above).  Patient has significant left ventricular dysfunction.  Patient was thought to have coronary artery disease that is not easily amenable for intervention and potentially could compromise circumflex if an attempt is made to stent the left main and left anterior descending artery.  We will reinitiate discussion with cardiovascular surgery Dr. Monge regarding surgical intervention.  However significant left ventricular dysfunction is of concern.     Cardiac catheterization 2015 revealed -  Previously placed LAD stent was patent.  Distal LAD has 50% disease.  Chronic and total occlusion of 1st marginal branch.   Distal marginal branch was filling from RCA.  Circumflex coronary artery has 90-95%  distal to the origin of marginal branch.  RCA is a large and dominant vessel that has diffuse 50% disease.     Echocardiogram 09/11/2018 revealed severe left ventricular dysfunction with ejection fraction of 25%.  Moderate mitral and tricuspid regurgitation is present.     Stress Cardiolite test 09/11/2018 revealed significant anterior apical inferior and poster lateral infarction and ischemia.       -status post anterior wall myocardial infarction 06/2013 and stent placement to LAD 06/2013.       -ischemic cardiomyopathy.  Recent left ventricular ejection fraction was 25-30%.       -smoker -patient was advised to abstain from smoking.        -chronic left bundle-branch block      -hypertension and dyslipidemia      -history of drinking alcohol     -no known allergies  ===========  Plan  =============  Status post CABG-Dr. Monge-6/22/2023.  Patient is not having any angina pectoris or congestive heart failure.    Ischemic cardiomyopathy  Continue GDMT.    Status BiV ICD 10/27/2023-Dr. Zamora.  ICD site looks normal.  Interrogation of the ICD revealed excellent pacing parameters-3/10/2025.  Battery status is 8 years.    Echocardiogram 9/4/2024-as above    Rhythm-sinus.  EKG 7/24/2023 revealed sinus rhythm nonspecific intraventricular conduction delay normal axis no ectopy no significant change from 6/24/2023.  EKG was not performed-3/10/2025    Dyslipidemia-on atorvastatin.  Patient is off Zetia.    Medications were reviewed and updated.  Patient is on aspirin atorvastatin amiodarone 200 mg twice daily Lasix 40 mg a day carphenazine metoprolol tartrate 12.5 mg twice daily pantoprazole.  Spironolactone 25 mg a day and low-dose lisinopril at 2.5 mg a day.     Patient is on amiodarone to 200 mg once a day.  Consider discontinuation of amiodarone at next visit.       Follow-up in the office in 6 months with ICD  interrogation.    Further plan will depend on patient's progress.  Reviewed and updated 3/10/2025.  ]]]]]]]]]]]]]]]]]]]]]        Diagnosis Plan   1. Status post angioplasty with stent        2. Ischemic cardiomyopathy        3. LBBB (left bundle branch block)        4. Cardiomyopathy, ischemic        5. Essential hypertension        6. Presence of biventricular implantable cardioverter-defibrillator (ICD)        7. Dyslipidemia        8. Chronic coronary artery disease        9. Hx of CABG        10. Chronic systolic CHF (congestive heart failure)        11. Acute on chronic systolic heart failure        LAB RESULTS (LAST 7 DAYS)    CBC        BMP        CMP         BNP        TROPONIN        CoAg        Creatinine Clearance  CrCl cannot be calculated (Patient's most recent lab result is older than the maximum 30 days allowed.).    ABG        Radiology  No radiology results for the last day                The following portions of the patient's history were reviewed and updated as appropriate: allergies, current medications, past family history, past medical history, past social history, past surgical history, and problem list.    Review of Systems   Constitutional: Negative for malaise/fatigue.   Cardiovascular:  Negative for chest pain, leg swelling, palpitations and syncope.   Respiratory:  Negative for shortness of breath.    Skin:  Negative for rash.   Gastrointestinal:  Negative for nausea and vomiting.   Neurological:  Negative for dizziness, light-headedness and numbness.   All other systems reviewed and are negative.      Current Outpatient Medications:     aspirin 81 MG EC tablet, Take 1 tablet by mouth Daily., Disp: , Rfl:     atorvastatin (LIPITOR) 80 MG tablet, Take 1 tablet by mouth Daily., Disp: 90 tablet, Rfl: 0    clopidogrel (PLAVIX) 75 MG tablet, Take 1 tablet by mouth Daily., Disp: 90 tablet, Rfl: 0    ezetimibe (ZETIA) 10 MG tablet, Take 1 tablet by mouth Daily., Disp: 90 tablet, Rfl: 0     lisinopril (PRINIVIL,ZESTRIL) 5 MG tablet, Take 1 tablet by mouth Daily., Disp: 90 tablet, Rfl: 0    metoprolol succinate XL (TOPROL-XL) 25 MG 24 hr tablet, TAKE 2 TABLETS BY MOUTH DAILY, Disp: 180 tablet, Rfl: 1    nitroglycerin (Nitrostat) 0.4 MG SL tablet, Place 1 tablet under the tongue Every 5 (Five) Minutes As Needed for Chest Pain. Take no more than 3 doses in 15 minutes., Disp: 30 tablet, Rfl: 6    Vitamin D, Cholecalciferol, (CHOLECALCIFEROL) 10 MCG (400 UNIT) tablet, Take 1 tablet by mouth Daily., Disp: , Rfl:   No current facility-administered medications for this visit.    Facility-Administered Medications Ordered in Other Visits:     Chlorhexidine Gluconate Cloth 2 % pads 1 application, 1 application , Topical, Q12H PRN, Virgie Mejia APRN    Allergies   Allergen Reactions    Adhesive Tape Rash       Family History   Problem Relation Age of Onset    Diabetes Father     Heart failure Father     Hypertension Father        Past Surgical History:   Procedure Laterality Date    BIVENTRICULLAR IMPLANTABLE CARDIOVERTER DEFIBRILLATOR PLACEMENT N/A 10/27/2023    Procedure: Implant ICD - bi ventricular, [employer] REP EMAILED;  Surgeon: Kaushal Zamora MD;  Location: [facility] CATH INVASIVE LOCATION;  Service: Cardiovascular;  Laterality: N/A;    CARDIAC CATHETERIZATION  05/30/2015    CARDIAC CATHETERIZATION N/A 06/02/2023    Procedure: Left Heart Cath with Coronary Angiography;  Surgeon: Judson Baumann MD;  Location: [facility] CATH INVASIVE LOCATION;  Service: Cardiovascular;  Laterality: N/A;    CARDIAC CATHETERIZATION N/A 06/02/2023    Procedure: Right Heart Cath;  Surgeon: Judosn Baumann MD;  Location: [facility] CATH INVASIVE LOCATION;  Service: Cardiovascular;  Laterality: N/A;    CARDIAC CATHETERIZATION  06/02/2023    Procedure: Functional Flow [place];  Surgeon: Yousif Dc MD;  Location: [facility] CATH INVASIVE LOCATION;  Service: Cardiovascular;;    CARDIAC DEFIBRILLATOR PLACEMENT      COLONOSCOPY       CORONARY ANGIOPLASTY WITH STENT PLACEMENT  2015    stent lad and circumflex distal to the marginal branch    CORONARY ANGIOPLASTY WITH STENT PLACEMENT  2013    stent to lad    CORONARY ARTERY BYPASS GRAFT N/A 2023    Procedure: CORONARY ARTERY BYPASS GRAFTING, POSSIBLE IMPELLA PLACEMENT;  Surgeon: Pedro Monge MD;  Location: [facility] CVOR;  Service: Cardiothoracic;  Laterality: N/A;  CABG X 2 (1 vein graft, 1 JUDD graft), Off pump with perfusion assist.    INSERT / REPLACE / REMOVE PACEMAKER      INTERVENTIONAL RADIOLOGY PROCEDURE N/A 2023    Procedure: Intravascular Ultrasound;  Surgeon: Yousif Dc MD;  Location: [facility] CATH INVASIVE LOCATION;  Service: Cardiovascular;  Laterality: N/A;    TRANSESOPHAGEAL ECHOCARDIOGRAM (YESENIA) N/A 2023    Procedure: TRANSESOPHAGEAL ECHOCARDIOGRAM WITH ANESTHESIA;  Surgeon: Pedro Monge MD;  Location: [facility] CV[facility];  Service: Cardiothoracic;  Laterality: N/A;       Past Medical History:   Diagnosis Date    BBB (bundle branch block)     lt bundle branch block    CAD (coronary artery disease)     Cardiomyopathy     CHD (coronary heart disease)     Colon polyp     Congenital heart disease     Heart murmur     Hyperlipidemia     Hypertension     Myocardial infarction 2023    acute    Sleep apnea        Family History   Problem Relation Age of Onset    Diabetes Father     Heart failure Father     Hypertension Father        Social History     Socioeconomic History    Marital status: Significant Other   Tobacco Use    Smoking status: Former     Current packs/day: 0.00     Types: Cigarettes     Quit date: 2008     Years since quittin.8     Passive exposure: Current    Smokeless tobacco: Never   Vaping Use    Vaping status: Never Used   Substance and Sexual Activity    Alcohol use: Not Currently    Drug use: Not Currently    Sexual activity: Yes     Partners: Female     Birth control/protection: Other         Procedures      Objective:  "      Physical Exam    /68 (BP Location: Left arm, Patient Position: Sitting, Cuff Size: Adult)   Pulse 60   Ht 170.2 cm (67\")   Wt 90.3 kg (199 lb)   SpO2 96%   BMI 31.17 kg/m²   The patient is alert, oriented and in no distress.    Vital signs as noted above.    Head and neck revealed no carotid bruits or jugular venous distension.  No thyromegaly or lymphadenopathy is present.    Lungs clear.  No wheezing.  Breath sounds are normal bilaterally.    Heart normal first and second heart sounds.  No murmur..  No pericardial rub is present.  No gallop is present.    Abdomen soft and nontender.  No organomegaly is present.    Extremities revealed good peripheral pulses without any pedal edema.    Skin warm and dry.  ICD site looks normal.    Musculoskeletal system is grossly normal.    CNS grossly normal.    Reviewed and updated.          "

## 2025-03-10 ENCOUNTER — CLINICAL SUPPORT NO REQUIREMENTS (OUTPATIENT)
Dept: CARDIOLOGY | Facility: CLINIC | Age: 65
End: 2025-03-10
Payer: MEDICARE

## 2025-03-10 ENCOUNTER — OFFICE VISIT (OUTPATIENT)
Dept: CARDIOLOGY | Facility: CLINIC | Age: 65
End: 2025-03-10
Payer: MEDICARE

## 2025-03-10 VITALS
OXYGEN SATURATION: 96 % | WEIGHT: 199 LBS | HEIGHT: 67 IN | BODY MASS INDEX: 31.23 KG/M2 | SYSTOLIC BLOOD PRESSURE: 115 MMHG | DIASTOLIC BLOOD PRESSURE: 68 MMHG | HEART RATE: 60 BPM

## 2025-03-10 DIAGNOSIS — I25.10 CHRONIC CORONARY ARTERY DISEASE: ICD-10-CM

## 2025-03-10 DIAGNOSIS — Z95.1 HX OF CABG: ICD-10-CM

## 2025-03-10 DIAGNOSIS — I50.22 CHRONIC SYSTOLIC CHF (CONGESTIVE HEART FAILURE): ICD-10-CM

## 2025-03-10 DIAGNOSIS — I50.23 ACUTE ON CHRONIC SYSTOLIC HEART FAILURE: ICD-10-CM

## 2025-03-10 DIAGNOSIS — I25.5 CARDIOMYOPATHY, ISCHEMIC: ICD-10-CM

## 2025-03-10 DIAGNOSIS — E78.5 DYSLIPIDEMIA: ICD-10-CM

## 2025-03-10 DIAGNOSIS — I10 ESSENTIAL HYPERTENSION: ICD-10-CM

## 2025-03-10 DIAGNOSIS — I44.7 LBBB (LEFT BUNDLE BRANCH BLOCK): ICD-10-CM

## 2025-03-10 DIAGNOSIS — I25.5 ISCHEMIC CARDIOMYOPATHY: ICD-10-CM

## 2025-03-10 DIAGNOSIS — I25.5 CARDIOMYOPATHY, ISCHEMIC: Primary | ICD-10-CM

## 2025-03-10 DIAGNOSIS — Z95.820 STATUS POST ANGIOPLASTY WITH STENT: Primary | ICD-10-CM

## 2025-03-10 DIAGNOSIS — Z95.810 PRESENCE OF BIVENTRICULAR IMPLANTABLE CARDIOVERTER-DEFIBRILLATOR (ICD): ICD-10-CM

## 2025-03-10 PROCEDURE — 3074F SYST BP LT 130 MM HG: CPT | Performed by: INTERNAL MEDICINE

## 2025-03-10 PROCEDURE — 1160F RVW MEDS BY RX/DR IN RCRD: CPT | Performed by: INTERNAL MEDICINE

## 2025-03-10 PROCEDURE — 99214 OFFICE O/P EST MOD 30 MIN: CPT | Performed by: INTERNAL MEDICINE

## 2025-03-10 PROCEDURE — 93284 PRGRMG EVAL IMPLANTABLE DFB: CPT | Performed by: INTERNAL MEDICINE

## 2025-03-10 PROCEDURE — 1159F MED LIST DOCD IN RCRD: CPT | Performed by: INTERNAL MEDICINE

## 2025-03-10 PROCEDURE — 3078F DIAST BP <80 MM HG: CPT | Performed by: INTERNAL MEDICINE

## 2025-04-01 ENCOUNTER — OFFICE VISIT (OUTPATIENT)
Dept: FAMILY MEDICINE CLINIC | Facility: CLINIC | Age: 65
End: 2025-04-01
Payer: MEDICARE

## 2025-04-01 VITALS
HEIGHT: 67 IN | TEMPERATURE: 98.6 F | SYSTOLIC BLOOD PRESSURE: 110 MMHG | WEIGHT: 192 LBS | OXYGEN SATURATION: 98 % | HEART RATE: 94 BPM | DIASTOLIC BLOOD PRESSURE: 74 MMHG | RESPIRATION RATE: 20 BRPM | BODY MASS INDEX: 30.13 KG/M2

## 2025-04-01 DIAGNOSIS — J10.1 INFLUENZA B: ICD-10-CM

## 2025-04-01 DIAGNOSIS — R05.1 ACUTE COUGH: Primary | ICD-10-CM

## 2025-04-01 DIAGNOSIS — R09.81 NASAL CONGESTION: ICD-10-CM

## 2025-04-01 LAB
EXPIRATION DATE: ABNORMAL
FLUAV AG UPPER RESP QL IA.RAPID: NOT DETECTED
FLUBV AG UPPER RESP QL IA.RAPID: DETECTED
INTERNAL CONTROL: ABNORMAL
Lab: ABNORMAL
SARS-COV-2 AG UPPER RESP QL IA.RAPID: NOT DETECTED

## 2025-04-01 PROCEDURE — 1160F RVW MEDS BY RX/DR IN RCRD: CPT | Performed by: PHYSICIAN ASSISTANT

## 2025-04-01 PROCEDURE — 87428 SARSCOV & INF VIR A&B AG IA: CPT | Performed by: PHYSICIAN ASSISTANT

## 2025-04-01 PROCEDURE — 1126F AMNT PAIN NOTED NONE PRSNT: CPT | Performed by: PHYSICIAN ASSISTANT

## 2025-04-01 PROCEDURE — 99213 OFFICE O/P EST LOW 20 MIN: CPT | Performed by: PHYSICIAN ASSISTANT

## 2025-04-01 PROCEDURE — 1159F MED LIST DOCD IN RCRD: CPT | Performed by: PHYSICIAN ASSISTANT

## 2025-04-01 PROCEDURE — 3078F DIAST BP <80 MM HG: CPT | Performed by: PHYSICIAN ASSISTANT

## 2025-04-01 PROCEDURE — 3074F SYST BP LT 130 MM HG: CPT | Performed by: PHYSICIAN ASSISTANT

## 2025-04-01 NOTE — PROGRESS NOTES
"Chief Complaint  Chief Complaint   Patient presents with    Fever    Cough    Nasal Congestion       Subjective        Zia Lott is a 64 y.o. male who presents to [facility].  History of Present Illness  Presents today for concerns of fever, cough, nasal congestion for the last week.  Had his granddaughter while she was sick, believes he contracted this from her.   Has not checked temperature with thermometer but feels chilled and warm.   Reports increased fatigue, nasal drainage, sore throat, dry cough, body aches, chills, and myalgias.   Denies ear pressure/fullness, facial pain/pressure, or SOA.   Has been taking Coricidin HBP and tylenol, which provides him with symptomatic relief.     Objective   /74   Pulse 94   Temp 98.6 °F (37 °C) (Oral)   Resp 20   Ht 170.2 cm (67\")   Wt 87.1 kg (192 lb)   SpO2 98%   BMI 30.07 kg/m²     Estimated body mass index is 30.07 kg/m² as calculated from the following:    Height as of this encounter: 170.2 cm (67\").    Weight as of this encounter: 87.1 kg (192 lb).     Physical Exam   GEN: In no acute distress, non toxic appearing  HEENT: Bilateral EACs clear, TMs of normal healthy appearance, middle ear spaces are clear. Mucous membranes moist. Oropharynx without erythema or exudate. No cervical or submandibular lymphadenopathy.  CV: Regular rate and rhythm, no murmurs, 2+ peripheral pulses, No extremity edema.   RESP: Lungs clear to auscultation anteriorly and posteriorly in all lung fields bilaterally.  PSYCH: Affect normal, insight fair     COVID/flu swab positive for flu B.     Result Review :              Assessment and Plan     Assessment & Plan  Acute cough    Orders:    POCT SARS-CoV-2 Antigen LUCÍA + Flu    Influenza B  COVID/flu swab positive for flu B today.  As his symptoms began a week ago, discussed Tamiflu would not provide him with benefit.  Encouraged him to continue Coricidin HBP and Tylenol as these medications " have provided him with relief.  Encouraged staying hydrated and resting.       Nasal congestion  Begin using Nasacort nasal spray for nasal congestion.       He is in agreement with this plan and will call should his symptoms worsen or not improved.       Follow Up     Return if symptoms worsen or fail to improve.

## 2025-05-05 ENCOUNTER — OFFICE VISIT (OUTPATIENT)
Dept: FAMILY MEDICINE CLINIC | Facility: CLINIC | Age: 65
End: 2025-05-05
Payer: MEDICARE

## 2025-05-05 VITALS
OXYGEN SATURATION: 98 % | HEART RATE: 80 BPM | WEIGHT: 190 LBS | SYSTOLIC BLOOD PRESSURE: 124 MMHG | RESPIRATION RATE: 14 BRPM | DIASTOLIC BLOOD PRESSURE: 70 MMHG | HEIGHT: 67 IN | BODY MASS INDEX: 29.82 KG/M2

## 2025-05-05 DIAGNOSIS — R09.81 SINUS CONGESTION: Primary | ICD-10-CM

## 2025-05-05 PROCEDURE — 3074F SYST BP LT 130 MM HG: CPT | Performed by: PHYSICIAN ASSISTANT

## 2025-05-05 PROCEDURE — 3078F DIAST BP <80 MM HG: CPT | Performed by: PHYSICIAN ASSISTANT

## 2025-05-05 PROCEDURE — 99213 OFFICE O/P EST LOW 20 MIN: CPT | Performed by: PHYSICIAN ASSISTANT

## 2025-05-05 PROCEDURE — 1126F AMNT PAIN NOTED NONE PRSNT: CPT | Performed by: PHYSICIAN ASSISTANT

## 2025-05-05 RX ORDER — FLUTICASONE PROPIONATE 50 MCG
2 SPRAY, SUSPENSION (ML) NASAL DAILY
Qty: 16 G | Refills: 1 | Status: SHIPPED | OUTPATIENT
Start: 2025-05-05

## 2025-05-05 NOTE — PROGRESS NOTES
"Subjective   Zia Lott is a 64 y.o. male.     Chief Complaint   Patient presents with    Sinusitis     Symptoms since 4/1/25       /70 (BP Location: Left arm, Patient Position: Sitting, Cuff Size: Adult)   Pulse 80   Resp 14   Ht 170.2 cm (67\")   Wt 86.2 kg (190 lb)   SpO2 98%   BMI 29.76 kg/m²     BP Readings from Last 3 Encounters:   05/05/25 124/70   04/01/25 110/74   03/10/25 115/68       Wt Readings from Last 3 Encounters:   05/05/25 86.2 kg (190 lb)   04/01/25 87.1 kg (192 lb)   03/10/25 90.3 kg (199 lb)       HPI Presents to the clinic with sinus pressure and congestion. Mainly just in the morning. Some mild associated cough but not productive. Has reflux but not worsening and no increase in the amount of tums he has been using. He was seen by john about a month ago and had virus and has actually felt like he has improved since then some. He is travelling and just wants to make sure he is ok to do so. No fevers or chills.     The following portions of the patient's history were reviewed and updated as appropriate: allergies, current medications, past family history, past medical history, past social history, past surgical history, and problem list.    Review of Systems    Objective   Physical Exam  Constitutional:       Appearance: Normal appearance.   HENT:      Right Ear: Tympanic membrane normal.      Left Ear: Tympanic membrane normal.      Nose: Congestion and rhinorrhea present.      Mouth/Throat:      Pharynx: No oropharyngeal exudate or posterior oropharyngeal erythema.      Comments: Poor dentition  Eyes:      Extraocular Movements: Extraocular movements intact.      Pupils: Pupils are equal, round, and reactive to light.   Cardiovascular:      Rate and Rhythm: Normal rate.      Heart sounds: No murmur heard.  Pulmonary:      Effort: Pulmonary effort is normal.      Breath sounds: No wheezing.   Neurological:      General: No focal deficit present.      Mental Status: He is " alert and oriented to person, place, and time.   Psychiatric:         Mood and Affect: Mood normal.         Behavior: Behavior normal.           Diagnoses and all orders for this visit:    1. Sinus congestion (Primary)    Other orders  -     fluticasone (FLONASE) 50 MCG/ACT nasal spray; Administer 2 sprays into the nostril(s) as directed by provider Daily.  Dispense: 16 g; Refill: 1    Add flonase to allergy pill. At this time no signs of acute bacterial infection. Likely has component of allergies. Monitor reflux. Follow up with dentist for dentition. Follow up with pcp if symptoms worsen.     Return if symptoms worsen or fail to improve.

## 2025-05-06 PROBLEM — R09.81 SINUS CONGESTION: Status: ACTIVE | Noted: 2025-05-06

## 2025-08-11 DIAGNOSIS — Z98.61 STATUS POST PERCUTANEOUS TRANSLUMINAL CORONARY ANGIOPLASTY: ICD-10-CM

## 2025-08-11 DIAGNOSIS — I10 PRIMARY HYPERTENSION: ICD-10-CM

## 2025-08-11 DIAGNOSIS — E78.2 MIXED HYPERLIPIDEMIA: ICD-10-CM

## 2025-08-11 DIAGNOSIS — Z12.5 SPECIAL SCREENING FOR MALIGNANT NEOPLASM OF PROSTATE: ICD-10-CM

## 2025-08-11 DIAGNOSIS — F17.210 CIGARETTE SMOKER: ICD-10-CM

## 2025-08-11 DIAGNOSIS — Z56.6 STRESS AT WORK: ICD-10-CM

## 2025-08-11 DIAGNOSIS — I25.10 CHRONIC CORONARY ARTERY DISEASE: ICD-10-CM

## 2025-08-11 RX ORDER — LISINOPRIL 5 MG/1
5 TABLET ORAL DAILY
Qty: 90 TABLET | Refills: 0 | Status: SHIPPED | OUTPATIENT
Start: 2025-08-11

## 2025-08-11 RX ORDER — EZETIMIBE 10 MG/1
10 TABLET ORAL DAILY
Qty: 90 TABLET | Refills: 0 | Status: SHIPPED | OUTPATIENT
Start: 2025-08-11

## 2025-08-11 RX ORDER — ATORVASTATIN CALCIUM 80 MG/1
80 TABLET, FILM COATED ORAL DAILY
Qty: 90 TABLET | Refills: 0 | Status: SHIPPED | OUTPATIENT
Start: 2025-08-11

## 2025-08-11 SDOH — HEALTH STABILITY - MENTAL HEALTH: OTHER PHYSICAL AND MENTAL STRAIN RELATED TO WORK: Z56.6

## 2025-08-15 LAB
MC_CV_MDC_IDC_RATE_1: 180
MC_CV_MDC_IDC_RATE_1: 210
MC_CV_MDC_IDC_SHOCK_MEASURED_IMPEDANCE: 74
MC_CV_MDC_IDC_THERAPIES: NORMAL
MC_CV_MDC_IDC_THERAPIES: NORMAL
MC_CV_MDC_IDC_ZONE_ID: 1
MC_CV_MDC_IDC_ZONE_ID: 2
MDC_IDC_MSMT_BATTERY_REMAINING_LONGEVITY: 90 MO
MDC_IDC_MSMT_BATTERY_REMAINING_PERCENTAGE: 100 %
MDC_IDC_MSMT_BATTERY_STATUS: NORMAL
MDC_IDC_MSMT_CAP_CHARGE_TIME: 10.4
MDC_IDC_MSMT_LEADCHNL_LV_DTM: NORMAL
MDC_IDC_MSMT_LEADCHNL_LV_IMPEDANCE_VALUE: 853
MDC_IDC_MSMT_LEADCHNL_LV_PACING_THRESHOLD_AMPLITUDE: 1.7
MDC_IDC_MSMT_LEADCHNL_LV_PACING_THRESHOLD_POLARITY: NORMAL
MDC_IDC_MSMT_LEADCHNL_LV_PACING_THRESHOLD_PULSEWIDTH: 0.4
MDC_IDC_MSMT_LEADCHNL_RA_DTM: NORMAL
MDC_IDC_MSMT_LEADCHNL_RA_IMPEDANCE_VALUE: 577
MDC_IDC_MSMT_LEADCHNL_RA_PACING_THRESHOLD_AMPLITUDE: 0.5
MDC_IDC_MSMT_LEADCHNL_RA_PACING_THRESHOLD_POLARITY: NORMAL
MDC_IDC_MSMT_LEADCHNL_RA_PACING_THRESHOLD_PULSEWIDTH: 0.4
MDC_IDC_MSMT_LEADCHNL_RA_SENSING_INTR_AMPL: 9.2
MDC_IDC_MSMT_LEADCHNL_RV_DTM: NORMAL
MDC_IDC_MSMT_LEADCHNL_RV_IMPEDANCE_VALUE: 428
MDC_IDC_MSMT_LEADCHNL_RV_PACING_THRESHOLD_AMPLITUDE: 0.5
MDC_IDC_MSMT_LEADCHNL_RV_PACING_THRESHOLD_POLARITY: NORMAL
MDC_IDC_MSMT_LEADCHNL_RV_PACING_THRESHOLD_PULSEWIDTH: 0.4
MDC_IDC_PG_IMPLANT_DTM: NORMAL
MDC_IDC_PG_MFG: NORMAL
MDC_IDC_PG_MODEL: NORMAL
MDC_IDC_PG_SERIAL: NORMAL
MDC_IDC_PG_TYPE: NORMAL
MDC_IDC_SESS_DTM: NORMAL
MDC_IDC_SESS_TYPE: NORMAL
MDC_IDC_SET_BRADY_AT_MODE_SWITCH_RATE: 170
MDC_IDC_SET_BRADY_LOWRATE: 60
MDC_IDC_SET_BRADY_MAX_SENSOR_RATE: 130
MDC_IDC_SET_BRADY_MAX_TRACKING_RATE: 130
MDC_IDC_SET_BRADY_MODE: NORMAL
MDC_IDC_SET_BRADY_PAV_DELAY: 180
MDC_IDC_SET_BRADY_SAV_DELAY: 110
MDC_IDC_SET_CRT_LVRV_DELAY: 0
MDC_IDC_SET_CRT_PACED_CHAMBERS: NORMAL
MDC_IDC_SET_LEADCHNL_LV_PACING_AMPLITUDE: 3.1
MDC_IDC_SET_LEADCHNL_LV_PACING_PULSEWIDTH: 0.4
MDC_IDC_SET_LEADCHNL_RA_PACING_AMPLITUDE: 2.4
MDC_IDC_SET_LEADCHNL_RA_PACING_POLARITY: NORMAL
MDC_IDC_SET_LEADCHNL_RA_PACING_PULSEWIDTH: 0.4
MDC_IDC_SET_LEADCHNL_RA_SENSING_POLARITY: NORMAL
MDC_IDC_SET_LEADCHNL_RA_SENSING_SENSITIVITY: 0.25
MDC_IDC_SET_LEADCHNL_RV_PACING_AMPLITUDE: 2.4
MDC_IDC_SET_LEADCHNL_RV_PACING_POLARITY: NORMAL
MDC_IDC_SET_LEADCHNL_RV_PACING_PULSEWIDTH: 0.4
MDC_IDC_SET_LEADCHNL_RV_SENSING_POLARITY: NORMAL
MDC_IDC_SET_LEADCHNL_RV_SENSING_SENSITIVITY: 0.6
MDC_IDC_SET_ZONE_STATUS: NORMAL
MDC_IDC_SET_ZONE_STATUS: NORMAL
MDC_IDC_SET_ZONE_TYPE: NORMAL
MDC_IDC_SET_ZONE_TYPE: NORMAL
MDC_IDC_STAT_AT_BURDEN_PERCENT: 0
MDC_IDC_STAT_BRADY_RA_PERCENT_PACED: 6
MDC_IDC_STAT_BRADY_RV_PERCENT_PACED: 98
MDC_IDC_STAT_CRT_LV_PERCENT_PACED: 98
MDC_IDC_STAT_TACHYTHERAPY_ATP_DELIVERED_RECENT: 0
MDC_IDC_STAT_TACHYTHERAPY_SHOCKS_ABORTED_RECENT: 0
MDC_IDC_STAT_TACHYTHERAPY_SHOCKS_DELIVERED_RECENT: 0

## 2025-08-21 ENCOUNTER — ON CAMPUS - OUTPATIENT (AMBULATORY)
Dept: URBAN - METROPOLITAN AREA HOSPITAL 85 | Facility: HOSPITAL | Age: 65
End: 2025-08-21
Payer: MEDICARE

## 2025-08-21 ENCOUNTER — ANESTHESIA (OUTPATIENT)
Dept: GASTROENTEROLOGY | Facility: HOSPITAL | Age: 65
End: 2025-08-21
Payer: MEDICARE

## 2025-08-21 ENCOUNTER — ANESTHESIA EVENT (OUTPATIENT)
Dept: GASTROENTEROLOGY | Facility: HOSPITAL | Age: 65
End: 2025-08-21
Payer: MEDICARE

## 2025-08-21 ENCOUNTER — HOSPITAL ENCOUNTER (OUTPATIENT)
Facility: HOSPITAL | Age: 65
Setting detail: HOSPITAL OUTPATIENT SURGERY
Discharge: HOME | End: 2025-08-21
Attending: INTERNAL MEDICINE | Admitting: INTERNAL MEDICINE
Payer: MEDICARE

## 2025-08-21 DIAGNOSIS — Z86.0101 PERSONAL HISTORY OF ADENOMATOUS AND SERRATED COLON POLYPS: ICD-10-CM

## 2025-08-21 DIAGNOSIS — Z09 ENCOUNTER FOR FOLLOW-UP EXAMINATION AFTER COMPLETED TREATMEN: ICD-10-CM

## 2025-08-21 DIAGNOSIS — K63.5 POLYP OF COLON: ICD-10-CM

## 2025-08-21 DIAGNOSIS — K57.30 DIVERTICULOSIS OF LARGE INTESTINE WITHOUT PERFORATION OR ABS: ICD-10-CM

## 2025-08-21 PROCEDURE — 25010000002 PROPOFOL 10 MG/ML EMULSION: Performed by: NURSE ANESTHETIST, CERTIFIED REGISTERED

## 2025-08-21 PROCEDURE — 25810000003 SODIUM CHLORIDE 0.9 % SOLUTION: Performed by: NURSE ANESTHETIST, CERTIFIED REGISTERED

## 2025-08-21 PROCEDURE — 45385 COLONOSCOPY W/LESION REMOVAL: CPT | Mod: PT | Performed by: INTERNAL MEDICINE

## 2025-08-21 RX ORDER — ETOMIDATE 2 MG/ML
INJECTION INTRAVENOUS AS NEEDED
Status: DISCONTINUED | OUTPATIENT
Start: 2025-08-21 | End: 2025-08-21 | Stop reason: SURG

## 2025-08-21 RX ORDER — SODIUM CHLORIDE 9 MG/ML
INJECTION, SOLUTION INTRAVENOUS CONTINUOUS PRN
Status: DISCONTINUED | OUTPATIENT
Start: 2025-08-21 | End: 2025-08-21 | Stop reason: SURG

## 2025-08-21 RX ORDER — PROPOFOL 10 MG/ML
VIAL (ML) INTRAVENOUS AS NEEDED
Status: DISCONTINUED | OUTPATIENT
Start: 2025-08-21 | End: 2025-08-21 | Stop reason: SURG

## 2025-08-21 RX ADMIN — PROPOFOL 30 MG: 10 INJECTION, EMULSION INTRAVENOUS at 11:51

## 2025-08-21 RX ADMIN — ETOMIDATE INJECTION 2 MG: 2 SOLUTION INTRAVENOUS at 11:51

## 2025-08-21 RX ADMIN — PROPOFOL 30 MG: 10 INJECTION, EMULSION INTRAVENOUS at 11:44

## 2025-08-21 RX ADMIN — ETOMIDATE INJECTION 4 MG: 2 SOLUTION INTRAVENOUS at 11:47

## 2025-08-21 RX ADMIN — PROPOFOL 20 MG: 10 INJECTION, EMULSION INTRAVENOUS at 11:42

## 2025-08-21 RX ADMIN — PROPOFOL 50 MG: 10 INJECTION, EMULSION INTRAVENOUS at 11:34

## 2025-08-21 RX ADMIN — ETOMIDATE INJECTION 10 MG: 2 SOLUTION INTRAVENOUS at 11:34

## 2025-08-21 RX ADMIN — PROPOFOL 40 MG: 10 INJECTION, EMULSION INTRAVENOUS at 11:38

## 2025-08-21 RX ADMIN — ETOMIDATE INJECTION 4 MG: 2 SOLUTION INTRAVENOUS at 11:40

## 2025-08-21 RX ADMIN — SODIUM CHLORIDE: 9 INJECTION, SOLUTION INTRAVENOUS at 11:27

## 2025-08-21 RX ADMIN — PROPOFOL 30 MG: 10 INJECTION, EMULSION INTRAVENOUS at 11:49

## (undated) DEVICE — 6F.070 XB LAD 3.5 SH: Brand: VISTA BRITE TIP

## (undated) DEVICE — UNDYED BRAIDED (POLYGLACTIN 910), SYNTHETIC ABSORBABLE SUTURE: Brand: COATED VICRYL

## (undated) DEVICE — CATH DIAG IMPULSE FL4 5F 100CM

## (undated) DEVICE — ANTIBACTERIAL UNDYED BRAIDED (POLYGLACTIN 910), SYNTHETIC ABSORBABLE SUTURE: Brand: COATED VICRYL

## (undated) DEVICE — SUT ETHIB 0/0 MO6 I8IN CX45D

## (undated) DEVICE — PACEMAKER CDS: Brand: MEDLINE INDUSTRIES, INC.

## (undated) DEVICE — PINNACLE INTRODUCER SHEATH: Brand: PINNACLE

## (undated) DEVICE — BALN PRESS WEDGE 6F 110CM

## (undated) DEVICE — CATH DIAG IMPULSE FR4 5F 100CM

## (undated) DEVICE — INTRO SHEATH PRELUDE SNAP .038 10F 13CM W/SDPRT FUSCHIA

## (undated) DEVICE — VIOLET BRAIDED (POLYGLACTIN 910), SYNTHETIC ABSORBABLE SUTURE: Brand: COATED VICRYL

## (undated) DEVICE — CATH DIAG IMPULSE PIG 5F 100CM

## (undated) DEVICE — ELECTRD DEFIB M/FUNC PROPADZ RADIOL 2PK

## (undated) DEVICE — INTRO SHEATH PRELUDE SNAP .038 6F 13CM W/SDPRT

## (undated) DEVICE — PK TRY HEART CATH 50

## (undated) DEVICE — CABL BIPOL W/ALLGTR CLIP/SM 12FT

## (undated) DEVICE — STCK MANICURE 144BX

## (undated) DEVICE — CATH INTRAVAS ULTRASND EAGLE EYE 2.9FR

## (undated) DEVICE — SWAN-GANZ TRUE SIZE THERMODILUTION "S" TIP: Brand: SWAN-GANZ TRUE SIZE

## (undated) DEVICE — GW PTFE EMERALD HEPCOAT FC J TIP STD .035 3MM 150CM

## (undated) DEVICE — 3M™ IOBAN™ 2 ANTIMICROBIAL INCISE DRAPE 6650EZ: Brand: IOBAN™ 2

## (undated) DEVICE — INTRO SHEATH PRELUDE SNAP .038 8.5F 13CM W/SDPRT LT/BLU

## (undated) DEVICE — GUIDE WIRE WITH HYDROPHILIC COATING: Brand: ACUITY WHISPER VIEW™

## (undated) DEVICE — 3M™ PATIENT PLATE, CORDED, SPLIT, LARGE, 40 PER CASE, 1179: Brand: 3M™

## (undated) DEVICE — Device: Brand: OMNIWIRE PRESSURE GUIDE WIRE